# Patient Record
Sex: FEMALE | Race: BLACK OR AFRICAN AMERICAN
[De-identification: names, ages, dates, MRNs, and addresses within clinical notes are randomized per-mention and may not be internally consistent; named-entity substitution may affect disease eponyms.]

---

## 2017-12-18 NOTE — NUR
BBRA FROM SNF FOR LOW O2 SAT LEVEL; PER REPORT PT'S OT SAT IN THE SNF WAS AT 
LOW 90'S. PT PLACED ON CONT CARDIAC AND POX MONITORING, SATS AT 94%, RT AT 
BEDSIDE SUCTIONING THE PT'S TRACH-O2 SAT IMPROVED % AFTER SUCTIONING. 
AFEBRILE. VSS ALL NEEDS ARE ATTENDED, PENDING ER MD HUERTA

## 2017-12-19 NOTE — NUR
RN NOTES



00:05 AM ADMITTED  A 67 YEARS OLD FEMALE PATIENT  FROM ER TRANSFER VIA STRETCHER AOX3  
ADMITTED UNDER  NEGRETTE DNP DIAGNOSED WITH RESP . FAILURE WITH HX. OF A- FIB, RESP FAILURE, 
VENT DEPT., PNEUMONECTOMY, GIB,. ANEMIA. HIGH CHOLESTEROL AND COPD. ALLERGIC TO CODEINE, 
ALBUTEROL  WARFARIN,. TRAMADOL. WITH TRACH PORTEX 8 CONNECTED TO VENT SETTING  AC 16   
FIO2 40% PEEP 5  TOLERATED WELL SATING 99%. PT IS VERBALLY RESPONSIVE.  WITH IV SITE ON 
RIGHT WRIST AND LEFT HAND G 20  INTACT AND PATENT NO INFILTRATION NO SWELLING WHEN FLUSHED.  
TELE MONITOR PLACED  REVEALS  SR - . NO ACUTE RESP DISTRESS. COMPLAINING OF PAIN  ON 
HER TRACH SITE. NO BLEEDING  FROM THE SITE. BUE AND BLE ABLE TO MOVE SKIN INTACT.  
INSTRUCTED TO USE CALL LIGHT WHEN NEED ASSISTANCE OR HELP. VS TAKEN TEMP  97.6 RESP 16 PULSE 
110 /76. KEPT PT CLEAN AND DRY BED BATH DONE. WILL CONTINUE TO MONITOR.

## 2017-12-19 NOTE — NUR
RN NOTES



SEEN AND EXAMINED BY DR. RASHEED. AWARE OF CURRENT LAB VALUES AND CXR RESULT. MD REVIEWED 
PT'S MEDICATIONS. ORDERED LABS IN AM.

## 2017-12-19 NOTE — NUR
RN CLOSING NOTES



PT STABLE. NO SIGNIFICANT CHANGE NOTED. PAIN WELL MANAGED. KEPT COMFORTABLE. IV LINES IN 
PLACE KEPT CLEAN AND DRY. ASSISTED IN REPOSITIONING. WILL ENDORSE FOR CONTINUITY OF CARE.

## 2017-12-19 NOTE — NUR
TOM[facility] RN INITIAL NOTE



PT RECEIVED IN BED. A/O X3 AND ABLE TO MOUTH WORDS OR WRITE ON PAPER. ON MECH VENT WITH 
SETTINGS WELL TOLERATED AND SATURATING WELL. TELE-SR 78. IV R WRIST/ L HAND #20 CLEAN, DRY, 
PATENT AND FLUSHING WELL. HOB ELEVATED AND ON ASPIRATION PRECAUTIONS. GTUBE CLAMPED WITHOUT 
RESIDUALS NOTED. NPO EXCEPT MEDICATIONS AT THIS TIME. CALL LIGHT WITHIN REACH. WILL CONTINUE 
TO MONITOR.

## 2017-12-19 NOTE — NUR
RN NOTES



PT ASLEEP WELL ON BED NO ACUTE RESP DISTRESS. AFEBRILE. VS STABLE. ALL DUE MEDICINE 
TOLERATED WELL. NO ASE FROM ATB GIVEN. TRACH AND VENT SETTING TOLERATED WELL. KEPT PT CLEAN 
AND DRY ALL NEEDS ATTENDED. ENDORSED CONTINUITY OF CARE TO AM NURSE.

## 2017-12-19 NOTE — NUR
RN INITIAL NOTES



RECEIVED PT AWAKE, A/OX3. NO RESPIRATORY DISTRESS NOTED. NO SOB NOTED. HOB ELEVATED. TRACH 
IN PLACE. TOLERATING VENT WELL. NO SIGNS OF PAIN NOTED. ON TELE MONITOR, SINUS RHYTHM AT 75. 
IV LINES IN PLACE. BLE ELEVATED. PT COMFORTABLE. WILL MONITOR.

## 2017-12-19 NOTE — NUR
spoke with  Aguilar [phone], patient resides st the [facility] 
at [address], [place] [phone]. Patient is trach/vent dependent, she is alert 
and responsive. Requires max to total assist with adl's. Has adequate DME at the [facility]. Current plan is to return to [facility]. 








-------------------------------------------------------------------------------

Addendum: 12/19/17 at 1507 by PEDRO ROMERO RN

-------------------------------------------------------------------------------

Amended: Links added.

## 2017-12-19 NOTE — NUR
RN NOTES



SEEN AND EXAMINED BY DR. VILLAFUERTE. AWARE OF CURRENT VENT SETTINGS AND ABG RESULT. ORDERED TO 
DECREASE AC RATE TO 12. NOTED AND CARRIED OUT. WILL MONITOR.

## 2017-12-20 NOTE — NUR
TOMÁS/RN - Notes



Dr Yepez at bedside for evaluation. Pt still noted with hypotension with BP 75/45. Per MD, 
discontinue 1L NS bolus d/t crackles. Hold Amiodarone gtt for now, as pt is hypotensive. 
Transfer to ICU to start Levophed and Amiodarone gtt. Charge nurse made aware.

## 2017-12-20 NOTE — NUR
TOM[facility]S/RN - Notes



ABG results to Dr Ghosh by Latrice reese RN. RT Zambrano received orders for ventilator changes, 
and ABG in 2 hours.

## 2017-12-20 NOTE — NUR
RN NOTES

IN BED, RESTING COMFORTABLY, ALERT, ABLE TO MAKE NEEDS KNOWN BY WRITING. RT AT BEDSIDE DID 
TRACHE SUCTIONING. PT HAS R WRIST HL AND LH HL. PEG CLAMPED. TURNED TO R SIDE. DIAPERED. NO 
C/O PAIN. BED LOW AND LOCKED. CALL LIGHT WITHIN REACHED. WILL CONTINUE TO MONITOR.

## 2017-12-20 NOTE — NUR
TOM[facility]S/RN - Transfer



Pt transferred to ICU via ACLS protocol. Pt to be started on Levophed for hypotension. 
Report given to RYLEE Tomas for continuity of care. Will notify family regarding transfer.

## 2017-12-20 NOTE — NUR
RN ICU

RECEIVED PATIENT FROM THE [facility]. PATIENT IS ALERT AND ORIENTED X 4. VENT/TRACH. NO ACUTE 
DISTRESS. NOW SINUS RHYTHM. BP MONITORED. AFEBRILE. VENT SETTINGS REVIEWED AND VERIFIED. 
TUBE FEEDING ON HOLD DUE TO ASPIRATION RISK. WILL CONTINUE TO MONITOR AND PROVIDE CARE.

## 2017-12-20 NOTE — NUR
TOMÁS/RN - Notes



Pt noted with elevated HR up to 160's on telemetry monitor. Stat EKG done showing atrial 
flutter with . /81. Pt noted to be anxious and diaphoretic. Dr Ghosh called and 
notified regarding change of condition. Received orders for stat CXR, doppler BLE, ABG 
troponin x3, and Ativan 1mg IVP one time. Will carry out orders.

## 2017-12-20 NOTE — NUR
TOMÁS/RN - Notes



Dr Marleni Armando called, regarding pt's condition. Pt HR at 155, /135, 
complaining of chest pain. Ativan 1mg IVP given. Received orders to obtain Cardio consult 
(Dr Andres), start Metoprolol 25mg BID via GT, and Morphine 2mg IVP Q4h. Verified with 
patient regarding codeine allergy, pt states "I have received Morphine before and not 
experience any reaction." Will carry out MD orders.

## 2017-12-20 NOTE — NUR
ICU/RN - Notes



Dr Yepez notified pt is hypotensive with BP 64/43. Received orders to give 1L NS bolus.

-------------------------------------------------------------------------------

Addendum: 12/20/17 at 1151 by ARUN REDMAN RN

-------------------------------------------------------------------------------

Intervention took place at 1026

## 2017-12-20 NOTE — NUR
TOMÁS/RN - Notes



Pt seen By Dr Andres at this time, with new orders received to start Amiodarone gtt.

## 2017-12-20 NOTE — NUR
TOM[facility]S RN CLOSING NOTE



NO ACUTE DISTRESS NOTED. VENT SETTINGS WELL TOLERATED. HOB ELEVATED. ON ASPIRATION 
PRECAUTIONS. NOTED WITH 100 ML RESIDUALS AT MIDNIGHT. 530 AM NOTED WITHOUT RESIDUALS AND 
FLUSHING WELL. ALL NEEDS ATTENDED TO PROMPTLY. CALL LIGHT WITHIN REACH. WILL ENDORSE TO NEXT 
SHIFT FOR CONTINUITY OF CARE.

## 2017-12-21 NOTE — NUR
TELE RN NOTE 

 RECEIVED PATENT ROM ICI ALERT , ORIENTED X2 \3, FAMILY AT BEDSIDE ,  VS TAKEN , WITH TRACH 
TO VENT SETTING AS ORDERED , AMBU ABG AT [facility] AT ALL TIME  , TRACH CARE DONE ,SUCTION DONE VS 
TAKEN,  BED IN LOWEST AND LOCKED POSITION, WILL CONT TO MONITOR CLOSELY

## 2017-12-21 NOTE — NUR
TELE RN INITIAL NOTES



RECEIVED PATIENT AWAKE, A/OX3, ABLE TO MAKE NEEDS KNOWN.  VENT DEPENDENT, MOUTHS WORDS.  
DENIES PAIN OR DISCOMFORT AT THIS TIME.   AT BEDSIDE.  RESPIRATIONS EVEN AND 
UNLABORED, WITH VENT SETTINGS AC 20, , FIO2 30%, PEEP 5, SPO2 100%.  SKIN WARM AND DRY 
TO TOUCH.  ON TELE MONITOR SINUS TACH.  WITH GTF AT 30ML/HR, GT PATENT AND INTACT, MINIMAL 
RESIDUAL NOTED.  WITH GOAL RATE 45ML/HR.  HOB KEPT ELEVATED.  SIDE RAILS UP AND LOCKED.  BED 
KEPT AT LOWEST POSITION.  CALL LIGHT KEPT WITHIN EASY REACH.  WILL CONTINUE TO MONITOR.

## 2017-12-21 NOTE — NUR
RN ICU

RECEIVED PATIENT FROM THE PREVIOUS SHIFT. PATIENT IS IN BED. RESTING COMFORTABLY. NO  ACUTE 
DISTRESS NOTED. ALERT AND AWAKE. VENT SETTINGS REVIEWED AND VERIFIED. SINUS RHYTHM ON 
MONITOR. TURNED AND REPOSITIONED FOR COMFORT AND WOUND PREVENTION. WILL CONTINUE TO MONITOR 
AND PROVIDE CARE.

## 2017-12-22 NOTE — NUR
RN INITIAL NOTES:



REC'D PT AWAKE ON BED, A/O X3, ABLE TO MAKE NEEDS KNOWN, NOT IN ANY DISTRESS, DENIES ANY 
PAIN/DISCOMFORT. ON MECH VENT VIA TRACH, SATURATING %. ON TELEMONITOR, SR. HAS PEG 
PATENT & INTACT, ON CONT TUBE FEEDING OF NUTREN X 45 CC/HR INFUSING WELL, NO RESIDUAL NOTED 
UPON CHECKING. HAS LFA G22, SL, FLUSHED, PATENT & INTACT W/ NO S/SX OF 
INFECTION/INFILTRATION NOTED. PROVIDED COMFORT & SAFETY MEASURES. BED KEPT LOW & IN LOCKED 
POS. CALL LIGHT PLACED W/IN REACH. WILL CONTINUE TO MONITOR AND ATTEND PT NEEDS.

## 2017-12-22 NOTE — NUR
TELE RN INITIAL NOTES



RECEIVED PATIENT AWAKE A/OX3 ABLE TO MAKE NEEDS KNOWN.  VENT DEPENDENT WITH VENT SETTINGS AC 
20, , FIO2 30%,  PEEP 5, SPO2 100%.  ON TELE MONITOR ATRIAL TACH 110.  WITH C/O 5/10 
PAIN.  SKIN WARM AND DRY TO TOUCH .  WITH GT PATENT AND INTACT.   NO RESIDUAL NOTED.  HOB 
KEPT ELEVATED.  SIDE RAILS UP AND LOCKED.  BED KEPT AT LOWEST POSITION.   CALL LIGHT KEPT 
WITHIN EASY REACH.  WILL CONTINUE TO MONITOR.

## 2017-12-22 NOTE — NUR
RN CLOSING NOTES:



NO ACUTE CHANGES NOTED W/IN SHIFT. PT TOLERATED MECH VENT SETTINGS VIA TRACH, SATURATING AT 
100%. ON TELEMONITOR, NOW ST. PEG KEPT PATENT & INTACT, ON CONT TUBE FEEDING OF NUTREN X 45 
CC/HR TOLERATED WELL, NO RESIDUAL NOTED W/IN SHIFT. LFA G22, SL, KEPT PATENT & INTACT W/ NO 
S/SX OF INFECTION/INFILTRATION NOTED. KEPT WELL RESTED. NEEDS ATTENDED. BED KEPT LOW & IN 
LOCKED POS. CALL LIGHT PLACED W/IN REACH. WILL ENDORSE TO PM RN FOR PHONG.

## 2017-12-23 NOTE — NUR
RN CLOSING NOTE



PT REMAINS IN NO ACUTE DISTRESS IN BED. PT DID NOT HAVE ANY SIGNIFICANT CHANGE IN CONDITION 
DURING SHIFT. PT TOLERATED VENT SETTING WELL. EMS AND RT AT BEDSIDE. PT TOLERATED TRANSFER 
TO [facility]. IV AND TELE REMOVED. PT LEFT HOSPITAL IN STABLE CONDITION WITH EMS TO [facility].

## 2017-12-23 NOTE — NUR
RN NOTES

PATIENT RESTING IN BED, NO ACUTE CHANGE IN CONDITION.  NO RESPIRATORY DISTRESS NOTED. 
TOLERATING CURRENT MECH VENT SETTINGS. PAIN MONITORED AND MANAGED AS NEEDED. HYDROCODONE PRN 
GIVEN WITH RELIEF. TOLERATING GTF, NO RESIDUAL NOTED.  KEPT CLEAN AND DRY. TURNED AND 
REPOSITIONED Q2H AND PRN.  SIDE RAILS UP AND LOCKED, HOB KEPT ELEVATED,  BED KEPT AT LOWEST 
POSITION.  CALL LIGHT KEPT WITHIN EASY REACH.  FAMILY AT BEDSIDE

## 2017-12-23 NOTE — NUR
TELE RN CLOSING NOTES



NO SIGNIFICANT CHANGES OVERNIGHT.   NO RESPIRATORY DISTRESS NOTED.  PAIN MONITORED AND 
MANAGED AS NEEDED.  TOLERATING CURRENT VENT SETTINGS.  TOLERATING GTF, NO RESIDUAL NOTED.  
KEPT CLEAN AND DRY.   TURNED AND REPOSITIONED Q2 AND PRN.  SIDE RAILS UP AND LOCKED.  BED 
KEPT AT LOWEST POSITION.  CALL LIGHT KEPT WITHIN EASY REACH.  WILL ENDORSE CONTINUITY OF 
CARE TO AM NURSE.

## 2017-12-23 NOTE — NUR
RN NOTES

[facility]IV[facility] TERESA MCKEE. ESTIMATED  TIME IS 1930

## 2018-01-24 ENCOUNTER — HOSPITAL ENCOUNTER (INPATIENT)
Dept: HOSPITAL 54 - ER | Age: 68
LOS: 14 days | Discharge: NURSING, REHAB OR LONG-TERM CARE | DRG: 207 | End: 2018-02-07
Attending: INTERNAL MEDICINE | Admitting: INTERNAL MEDICINE
Payer: COMMERCIAL

## 2018-01-24 VITALS — SYSTOLIC BLOOD PRESSURE: 100 MMHG | DIASTOLIC BLOOD PRESSURE: 59 MMHG

## 2018-01-24 VITALS — DIASTOLIC BLOOD PRESSURE: 68 MMHG | SYSTOLIC BLOOD PRESSURE: 114 MMHG

## 2018-01-24 VITALS — DIASTOLIC BLOOD PRESSURE: 64 MMHG | SYSTOLIC BLOOD PRESSURE: 115 MMHG

## 2018-01-24 VITALS — DIASTOLIC BLOOD PRESSURE: 66 MMHG | SYSTOLIC BLOOD PRESSURE: 99 MMHG

## 2018-01-24 VITALS — SYSTOLIC BLOOD PRESSURE: 111 MMHG | DIASTOLIC BLOOD PRESSURE: 66 MMHG

## 2018-01-24 VITALS — BODY MASS INDEX: 21.52 KG/M2 | HEIGHT: 61 IN | WEIGHT: 114 LBS

## 2018-01-24 VITALS — DIASTOLIC BLOOD PRESSURE: 66 MMHG | SYSTOLIC BLOOD PRESSURE: 112 MMHG

## 2018-01-24 VITALS — DIASTOLIC BLOOD PRESSURE: 69 MMHG | SYSTOLIC BLOOD PRESSURE: 117 MMHG
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
## 2018-01-24 DIAGNOSIS — R13.10: ICD-10-CM

## 2018-01-24 DIAGNOSIS — I25.10: ICD-10-CM

## 2018-01-24 DIAGNOSIS — J96.21: Primary | ICD-10-CM

## 2018-01-24 DIAGNOSIS — I48.92: ICD-10-CM

## 2018-01-24 DIAGNOSIS — I48.91: ICD-10-CM

## 2018-01-24 DIAGNOSIS — I11.0: ICD-10-CM

## 2018-01-24 DIAGNOSIS — J96.22: ICD-10-CM

## 2018-01-24 DIAGNOSIS — K21.9: ICD-10-CM

## 2018-01-24 DIAGNOSIS — M41.9: ICD-10-CM

## 2018-01-24 DIAGNOSIS — I50.9: ICD-10-CM

## 2018-01-24 DIAGNOSIS — Z79.01: ICD-10-CM

## 2018-01-24 DIAGNOSIS — F41.9: ICD-10-CM

## 2018-01-24 DIAGNOSIS — N17.0: ICD-10-CM

## 2018-01-24 DIAGNOSIS — Z99.11: ICD-10-CM

## 2018-01-24 DIAGNOSIS — D64.9: ICD-10-CM

## 2018-01-24 DIAGNOSIS — S31.811A: ICD-10-CM

## 2018-01-24 DIAGNOSIS — D68.59: ICD-10-CM

## 2018-01-24 DIAGNOSIS — Z90.2: ICD-10-CM

## 2018-01-24 DIAGNOSIS — E78.5: ICD-10-CM

## 2018-01-24 DIAGNOSIS — E44.0: ICD-10-CM

## 2018-01-24 DIAGNOSIS — Z93.0: ICD-10-CM

## 2018-01-24 DIAGNOSIS — Z93.1: ICD-10-CM

## 2018-01-24 DIAGNOSIS — Z87.891: ICD-10-CM

## 2018-01-24 DIAGNOSIS — J93.83: ICD-10-CM

## 2018-01-24 DIAGNOSIS — D72.829: ICD-10-CM

## 2018-01-24 DIAGNOSIS — T38.0X5A: ICD-10-CM

## 2018-01-24 DIAGNOSIS — J44.1: ICD-10-CM
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
## 2018-01-24 LAB
ALBUMIN SERPL BCP-MCNC: 3.2 G/DL (ref 3.4–5)
ALP SERPL-CCNC: 254 U/L (ref 46–116)
ALT SERPL W P-5'-P-CCNC: 46 U/L (ref 12–78)
APPEARANCE UR: (no result)
APTT PPP: 27 SEC (ref 23–34)
AST SERPL W P-5'-P-CCNC: 59 U/L (ref 15–37)
BASOPHILS # BLD AUTO: 0 /CMM (ref 0–0.2)
BASOPHILS NFR BLD AUTO: 0.3 % (ref 0–2)
BILIRUB DIRECT SERPL-MCNC: 0.1 MG/DL (ref 0–0.2)
BILIRUB SERPL-MCNC: 0.6 MG/DL (ref 0.2–1)
BILIRUB UR QL STRIP: NEGATIVE
BUN SERPL-MCNC: 29 MG/DL (ref 7–18)
CALCIUM SERPL-MCNC: 9.8 MG/DL (ref 8.5–10.1)
CHLORIDE SERPL-SCNC: 97 MMOL/L (ref 98–107)
CO2 SERPL-SCNC: 33 MMOL/L (ref 21–32)
COLOR UR: YELLOW
CREAT SERPL-MCNC: 0.9 MG/DL (ref 0.6–1.3)
EOSINOPHIL # BLD AUTO: 0.1 /CMM (ref 0–0.7)
EOSINOPHIL NFR BLD AUTO: 0.5 % (ref 0–6)
GLUCOSE SERPL-MCNC: 169 MG/DL (ref 74–106)
GLUCOSE UR STRIP-MCNC: NEGATIVE MG/DL
HCT VFR BLD AUTO: 39 % (ref 33–45)
HGB BLD-MCNC: 12.4 G/DL (ref 11.5–14.8)
HGB UR QL STRIP: NEGATIVE ERY/UL
INR PPP: 1.1 (ref 0.87–1.13)
KETONES UR STRIP-MCNC: NEGATIVE MG/DL
LEUKOCYTE ESTERASE UR QL STRIP: NEGATIVE
LYMPHOCYTES NFR BLD AUTO: 2.5 /CMM (ref 0.8–4.8)
LYMPHOCYTES NFR BLD AUTO: 23.7 % (ref 20–44)
MCH RBC QN AUTO: 28 PG (ref 26–33)
MCHC RBC AUTO-ENTMCNC: 32 G/DL (ref 31–36)
MCV RBC AUTO: 88 FL (ref 82–100)
MONOCYTES NFR BLD AUTO: 0.4 /CMM (ref 0.1–1.3)
MONOCYTES NFR BLD AUTO: 3.3 % (ref 2–12)
NEUTROPHILS # BLD AUTO: 7.7 /CMM (ref 1.8–8.9)
NEUTROPHILS NFR BLD AUTO: 72.2 % (ref 43–81)
NITRITE UR QL STRIP: NEGATIVE
NT-PROBNP SERPL-MCNC: 4370 PG/ML (ref 0–125)
PH UR STRIP: 6 [PH] (ref 5–8)
PLATELET # BLD AUTO: 298 /CMM (ref 150–450)
POTASSIUM SERPL-SCNC: 5.1 MMOL/L (ref 3.5–5.1)
PROT SERPL-MCNC: 8.6 G/DL (ref 6.4–8.2)
PROT UR QL STRIP: (no result) MG/DL
RBC # BLD AUTO: 4.44 MIL/UL (ref 4–5.2)
RBC #/AREA URNS HPF: (no result) /HPF (ref 0–2)
RDW COEFFICIENT OF VARIATION: 16.3 (ref 11.5–15)
SODIUM SERPL-SCNC: 131 MMOL/L (ref 136–145)
TROPONIN I SERPL-MCNC: < 0.017 NG/ML (ref 0–0.06)
UROBILINOGEN UR STRIP-MCNC: 0.2 EU/DL
WBC #/AREA URNS HPF: (no result) /HPF (ref 0–3)
WBC NRBC COR # BLD AUTO: 10.7 K/UL (ref 4.3–11)

## 2018-01-24 PROCEDURE — A4216 STERILE WATER/SALINE, 10 ML: HCPCS

## 2018-01-24 PROCEDURE — A6403 STERILE GAUZE>16 <= 48 SQ IN: HCPCS

## 2018-01-24 PROCEDURE — A4606 OXYGEN PROBE USED W OXIMETER: HCPCS

## 2018-01-24 PROCEDURE — Z7610: HCPCS

## 2018-01-24 PROCEDURE — A7526 TRACHEOSTOMY TUBE COLLAR: HCPCS

## 2018-01-24 PROCEDURE — A4623 TRACHEOSTOMY INNER CANNULA: HCPCS

## 2018-01-24 PROCEDURE — 5A1955Z RESPIRATORY VENTILATION, GREATER THAN 96 CONSECUTIVE HOURS: ICD-10-PCS | Performed by: INTERNAL MEDICINE

## 2018-01-24 RX ADMIN — METOPROLOL TARTRATE SCH MG: 25 TABLET, FILM COATED ORAL at 09:00

## 2018-01-24 RX ADMIN — PANTOPRAZOLE SODIUM SCH MG: 40 GRANULE, DELAYED RELEASE ORAL at 09:12

## 2018-01-24 RX ADMIN — LORAZEPAM PRN MG: 1 TABLET ORAL at 10:04

## 2018-01-24 RX ADMIN — Medication PRN OZ: at 09:14

## 2018-01-24 RX ADMIN — Medication SCH MG: at 20:08

## 2018-01-24 RX ADMIN — ACETAMINOPHEN PRN MG: 325 TABLET ORAL at 16:17

## 2018-01-24 RX ADMIN — Medication PRN ML: at 09:41

## 2018-01-24 RX ADMIN — DOCUSATE SODIUM SCH MG: 50 LIQUID ORAL at 09:12

## 2018-01-24 RX ADMIN — FERROUS SULFATE TAB 325 MG (65 MG ELEMENTAL FE) SCH MG: 325 (65 FE) TAB at 09:12

## 2018-01-24 RX ADMIN — SIMETHICONE SCH MG: 20 SUSPENSION/ DROPS ORAL at 09:13

## 2018-01-24 RX ADMIN — Medication SCH MG: at 07:29

## 2018-01-24 RX ADMIN — Medication SCH MG: at 03:37

## 2018-01-24 RX ADMIN — Medication SCH MG: at 09:12

## 2018-01-24 RX ADMIN — Medication SCH MG: at 11:32

## 2018-01-24 RX ADMIN — LEVOFLOXACIN SCH MLS/HR: 500 INJECTION, SOLUTION INTRAVENOUS at 03:43

## 2018-01-24 RX ADMIN — RIVAROXABAN SCH MG: 10 TABLET, FILM COATED ORAL at 16:17

## 2018-01-24 RX ADMIN — ATORVASTATIN CALCIUM SCH MG: 10 TABLET, FILM COATED ORAL at 21:56

## 2018-01-24 RX ADMIN — Medication SCH MG: at 23:44

## 2018-01-24 RX ADMIN — FOLIC ACID SCH MG: 1 TABLET ORAL at 09:12

## 2018-01-24 RX ADMIN — SODIUM CHLORIDE PRN MLS/HR: 9 INJECTION, SOLUTION INTRAVENOUS at 03:43

## 2018-01-24 RX ADMIN — METOPROLOL TARTRATE SCH MG: 25 TABLET, FILM COATED ORAL at 21:00

## 2018-01-24 RX ADMIN — SODIUM CHLORIDE PRN MLS/HR: 9 INJECTION, SOLUTION INTRAVENOUS at 16:21

## 2018-01-24 RX ADMIN — Medication SCH MG: at 14:40

## 2018-01-24 NOTE — NUR
to bed 2 bib paramedics c/o sob, rhonchi heard bilaterally on auscultation. pt 
aaox4 no acute distress noted, resp even and unlabored. place pt no cardiac 
monitoring, continuous pox. rt at bedside to place pt on vent.

## 2018-01-24 NOTE — NUR
[facility]OM[facility] RN OPENING NOTES

RECEIVED REPORT FROM ALFA MCKEE. PATIENT A/A/O X3, ABLE TO MOUTH WORDS & MAKE NEEDS KNOWN. 
BREATHING EVEN & UNLABORED W/ TRACH INTACT & TOLERATING VENT SETTINGS AC 16, , FIO2 
28%, PEEP 5. NO RESPIRATORY DISTRESS NOTED. ON TELE SINUS TACH, . DENIES NAY CHEST 
PAIN OR DISCOMFORT. LEFT FOREARM IV #18 INTACT W/ DRESSING CDI & IVF NS @ 75 ML/HR. G-TUBE 
INTACT & FLUSHING WELL. GTF NUTREN @ 45 ML/HR, NO RESIDUAL NOTED @ THIS TIME. PATIENT 
RESTING COMFORTABLY IN BED. SAFETY MEASURES IN PLACE W/ SIDE RAILS UP, BED LOCKED & IN 
LOWEST POSITION & CALL LIGHT WITHIN REACH. FAMILY @ BEDSIDE. WILL CONTINUE TO MONITOR.

-------------------------------------------------------------------------------

Addendum: 01/24/18 at 1953 by TIM LLOYD RN

-------------------------------------------------------------------------------

RECEIVED REPORT FROM IVA RYAN

## 2018-01-24 NOTE — NUR
RN CLOSING NOTES:



NO ACUTE CHANGES NOTED W/ SHIFT. PT TOLERATED MV VIA TRACH. ON TELEMONITOR, STILL SR/ST. PEG 
KEPT PATENT & INTACT, STARTED ON NUTREN X 45 CC/HR TOLERATED WELL, NO RESIDUAL NOTED W/IN 
SHIFT. LFA G18 NO S/SX OF INFECTION/ INFILTRATION NOTED W/ NS X 75 CC/HR. KEPT WELL RESTED. 
NEEDS ATTENDED. BED KEPT LOW & IN LOCKED POS. CALL LIGHT PLACED W/IN REACH. ENDORSED TO PM 
RN FOR PHONG.

## 2018-01-24 NOTE — NUR
[facility]OM[facility] RN CLOSING NOTE



PT REMAINED STABLE DURING SHIFT. NO C/O PAIN. NO S/SX OF RESPIRATORY DISTRESS NOTED. HOB 
ELEVATED. IV FLUIDS STARTED AND MEDS GIVEN. KEPT CLEAN AND DRY. SUCTIONED AS NEEDED. CALL 
LIGHT WITHIN REACH. WILL ENDORSE TO NEXT SHIFT FOR CONTINUITY OF CARE.

## 2018-01-24 NOTE — NUR
Received pt on vent support, pt stable on current settings, no SOB or respiratory distress 
noted.Ventilator is plugged into red outlet, alarms are audible, ambu bag at bedside. Will 
continue monitoring per MDS orders.

-------------------------------------------------------------------------------

Addendum: 01/24/18 at 0536 by TEO HIGGINBOTHAM RT

-------------------------------------------------------------------------------

Amended: Links added.

## 2018-01-24 NOTE — NUR
RN INITIAL NOTES:



REC'D PT AWAKE ON BED, NOT IN ANY DISTRESS, A/O X 3, ABLE TO MAKE NEEDS KNOWN. ON MV VIA 
TRACH, SATING %. ON TELEMONITOR, SR/ST. HAS PEG PATENT & INTACT, CLAMPED AT THIS TIME, 
FEEDING TO START. HAS LFA G18 W/ NS X 75 CC/HR INFUSING WELL. PROVIDED COMFORT & SAFETY 
MEASURES. BED KEPT LOW & IN LOCKED POS. CALL LIGHT PLACED W/IN REACH. WILL CONTINUE TO 
MONITOR AND ATTEND PT NEEDS.

## 2018-01-24 NOTE — NUR
TOM[facility]S RN INITIAL NOTE



A/O X4 AND ABLE TO MOUTH WORDS OR WRITE DOWN NEEDS. VENT DEPENDENT AND TOLERATING WELL. NO 
SOB NOTED. NO C/O PAIN OR DISCOMFORT. HOB ELEVATED. ON ASPIRATION PRECAUTIONS. IV LFA #18 
CLEAN AND DRY. ALL ORDERS CARRIED OUT. CALL LIGHT WITHIN REACH. WILL CONTINUE TO MONITOR.

## 2018-01-25 VITALS — SYSTOLIC BLOOD PRESSURE: 128 MMHG | DIASTOLIC BLOOD PRESSURE: 66 MMHG

## 2018-01-25 VITALS — SYSTOLIC BLOOD PRESSURE: 123 MMHG | DIASTOLIC BLOOD PRESSURE: 77 MMHG

## 2018-01-25 VITALS — SYSTOLIC BLOOD PRESSURE: 132 MMHG | DIASTOLIC BLOOD PRESSURE: 99 MMHG

## 2018-01-25 VITALS — SYSTOLIC BLOOD PRESSURE: 132 MMHG | DIASTOLIC BLOOD PRESSURE: 77 MMHG

## 2018-01-25 VITALS — SYSTOLIC BLOOD PRESSURE: 120 MMHG | DIASTOLIC BLOOD PRESSURE: 72 MMHG

## 2018-01-25 VITALS — DIASTOLIC BLOOD PRESSURE: 66 MMHG | SYSTOLIC BLOOD PRESSURE: 130 MMHG

## 2018-01-25 LAB
BASE EXCESS BLDA CALC-SCNC: 0.4 MMOL/L
BASOPHILS # BLD AUTO: 0 /CMM (ref 0–0.2)
BASOPHILS NFR BLD AUTO: 0.1 % (ref 0–2)
BUN SERPL-MCNC: 25 MG/DL (ref 7–18)
CALCIUM SERPL-MCNC: 9.3 MG/DL (ref 8.5–10.1)
CHLORIDE SERPL-SCNC: 104 MMOL/L (ref 98–107)
CO2 SERPL-SCNC: 26 MMOL/L (ref 21–32)
CREAT SERPL-MCNC: 1 MG/DL (ref 0.6–1.3)
DO-HGB MFR BLDA: 37.6 MMHG
EOSINOPHIL # BLD AUTO: 0 /CMM (ref 0–0.7)
EOSINOPHIL NFR BLD AUTO: 0 % (ref 0–6)
GLUCOSE SERPL-MCNC: 216 MG/DL (ref 74–106)
HCT VFR BLD AUTO: 39 % (ref 33–45)
HGB BLD-MCNC: 12.5 G/DL (ref 11.5–14.8)
INHALED O2 CONCENTRATION: 28 %
INTRINSIC PEEP RESPIRATORY: 5 CM H2O
LYMPHOCYTES NFR BLD AUTO: 2.4 /CMM (ref 0.8–4.8)
LYMPHOCYTES NFR BLD AUTO: 21.8 % (ref 20–44)
MAGNESIUM SERPL-MCNC: 1.9 MG/DL (ref 1.8–2.4)
MCH RBC QN AUTO: 28 PG (ref 26–33)
MCHC RBC AUTO-ENTMCNC: 32 G/DL (ref 31–36)
MCV RBC AUTO: 88 FL (ref 82–100)
MONOCYTES NFR BLD AUTO: 0.1 /CMM (ref 0.1–1.3)
MONOCYTES NFR BLD AUTO: 0.6 % (ref 2–12)
NEUTROPHILS # BLD AUTO: 8.4 /CMM (ref 1.8–8.9)
NEUTROPHILS NFR BLD AUTO: 77.5 % (ref 43–81)
PCO2 TEMP ADJ BLDA: 42.9 MMHG (ref 35–45)
PEEP SETTING VENT: 400 ML
PH TEMP ADJ BLDA: 7.39 [PH] (ref 7.35–7.45)
PHOSPHATE SERPL-MCNC: 4.1 MG/DL (ref 2.5–4.9)
PLATELET # BLD AUTO: 342 /CMM (ref 150–450)
PO2 TEMP ADJ BLDA: 111.4 MMHG (ref 75–100)
POTASSIUM SERPL-SCNC: 4.8 MMOL/L (ref 3.5–5.1)
RBC # BLD AUTO: 4.46 MIL/UL (ref 4–5.2)
RDW COEFFICIENT OF VARIATION: 16.3 (ref 11.5–15)
SAO2 % BLDA: 97.4 % (ref 92–98.5)
SET RATE, BG: 16
SODIUM SERPL-SCNC: 140 MMOL/L (ref 136–145)
VENTILATION MODE VENT: (no result)
WBC NRBC COR # BLD AUTO: 10.9 K/UL (ref 4.3–11)

## 2018-01-25 RX ADMIN — Medication SCH MG: at 08:52

## 2018-01-25 RX ADMIN — ATORVASTATIN CALCIUM SCH MG: 10 TABLET, FILM COATED ORAL at 23:32

## 2018-01-25 RX ADMIN — ACETAMINOPHEN PRN MG: 325 TABLET ORAL at 11:43

## 2018-01-25 RX ADMIN — LEVOFLOXACIN SCH MLS/HR: 500 INJECTION, SOLUTION INTRAVENOUS at 02:44

## 2018-01-25 RX ADMIN — Medication PRN MG: at 14:06

## 2018-01-25 RX ADMIN — FERROUS SULFATE TAB 325 MG (65 MG ELEMENTAL FE) SCH MG: 325 (65 FE) TAB at 08:52

## 2018-01-25 RX ADMIN — NITROGLYCERIN PRN MG: 0.4 TABLET, ORALLY DISINTEGRATING SUBLINGUAL at 03:35

## 2018-01-25 RX ADMIN — NITROGLYCERIN PRN MG: 0.4 TABLET, ORALLY DISINTEGRATING SUBLINGUAL at 03:21

## 2018-01-25 RX ADMIN — ASPIRIN 81 MG SCH MG: 81 TABLET ORAL at 08:52

## 2018-01-25 RX ADMIN — FOLIC ACID SCH MG: 1 TABLET ORAL at 08:52

## 2018-01-25 RX ADMIN — Medication SCH MG: at 19:19

## 2018-01-25 RX ADMIN — Medication PRN MG: at 08:53

## 2018-01-25 RX ADMIN — DOCUSATE SODIUM SCH MG: 50 LIQUID ORAL at 08:52

## 2018-01-25 RX ADMIN — ACETAMINOPHEN PRN MG: 325 TABLET ORAL at 06:53

## 2018-01-25 RX ADMIN — NITROGLYCERIN PRN MG: 0.4 TABLET, ORALLY DISINTEGRATING SUBLINGUAL at 03:27

## 2018-01-25 RX ADMIN — Medication SCH MG: at 11:12

## 2018-01-25 RX ADMIN — Medication SCH MG: at 07:17

## 2018-01-25 RX ADMIN — METOPROLOL TARTRATE SCH MG: 25 TABLET, FILM COATED ORAL at 23:32

## 2018-01-25 RX ADMIN — Medication SCH MG: at 16:08

## 2018-01-25 RX ADMIN — PANTOPRAZOLE SODIUM SCH MG: 40 GRANULE, DELAYED RELEASE ORAL at 08:52

## 2018-01-25 RX ADMIN — RIVAROXABAN SCH MG: 10 TABLET, FILM COATED ORAL at 16:53

## 2018-01-25 RX ADMIN — LEVOFLOXACIN SCH MG: 500 TABLET, FILM COATED ORAL at 23:33

## 2018-01-25 RX ADMIN — METOPROLOL TARTRATE SCH MG: 25 TABLET, FILM COATED ORAL at 08:53

## 2018-01-25 RX ADMIN — Medication SCH MG: at 23:15

## 2018-01-25 RX ADMIN — SIMETHICONE SCH MG: 20 SUSPENSION/ DROPS ORAL at 08:59

## 2018-01-25 RX ADMIN — LORAZEPAM PRN MG: 1 TABLET ORAL at 02:44

## 2018-01-25 RX ADMIN — Medication PRN ML: at 06:47

## 2018-01-25 RX ADMIN — SODIUM CHLORIDE PRN MLS/HR: 9 INJECTION, SOLUTION INTRAVENOUS at 09:04

## 2018-01-25 RX ADMIN — LORAZEPAM PRN MG: 1 TABLET ORAL at 15:33

## 2018-01-25 RX ADMIN — Medication SCH MG: at 03:44

## 2018-01-25 NOTE — NUR
TOM[facility]S RN NOTE 

 SEEN BY RT ,BREATHING TX DONE ,TYLENOL FOR GENERAL PAIN GIVEN, WILL CONT TO MONITOR 
CLOSELY. PER DR FANTA NOYOLA IVF

## 2018-01-25 NOTE — NUR
TOM[facility]S RN NOTES

ASPIRIN 81 MG ADMINISTERED VIA G-TUBE. PATIENT CONTINUES TO C/O NON-RADIATING CHEST PAIN W/ 
HR STILL IN 200S. SPOKE TO DR JEWELL & NEW ORDER FOR ADENOSINE PUSH RECEIVED.

## 2018-01-25 NOTE — NUR
[facility]OM[facility] RN INITIAL NOTE



PT RECEIVED ASLEEP BUT EASILY AROUSABLE TO NAME AND LIGHT TOUCH. A/O X3 AND ABLE TO MOUTH 
WORDS OR WRITE NEEDS ON PAPER. ON MECH VENT WITH SETTINGS WELL TOLERATED AND SATURATING 
WELL. TELE- A-FLUTTER 70. IV LFA #18 CLEAN, DRY AND PATENT. GTUBE FEEDING WELL TOLERATED AND 
NO RESIDUALS NOTED. HOB ELEVATED AND ON ASPIRATION PRECAUTIONS. NO C/O PAIN OR DISCOMFORT 
NOTED. CALL LIGHT WITHIN REACH. WILL CONTINUE TO MONITOR.

## 2018-01-25 NOTE — NUR
RCVD PT ON VENT WITH NOTED SETTINGS. PT IS ALERT AND AWAKE. VENT ALARM WORKING AND AUDIBLE, 
VENT PLUGGED INTO RED OUTLET, AMBU BAG AT BEDSIDE. SXN  SMALL AMOUNT OF YELLOW THICK 
SECRETIONS. TX GIVEN PER MD'S ORDERED, NO ADVERSE REACTION NOTED. NO RESPIRATORY DISTRESS 
NOTED AT THIS TIME . WILL CONTINUE TO MONITOR THE PT.

## 2018-01-25 NOTE — NUR
TOM[facility]S RN NOTES

PATIENT NOTED W/ INCREASED HR 200S AND TELE READING V-TACH W/ C/O NON-RADIATING CHEST PAIN. 
/92. CALLED & SPOKE TO DR JEWELL & NEW ORDERS RECEIVED.

## 2018-01-25 NOTE — NUR
lalita rn note

 c\o general pain in body, morphine 2 mg ivp , abg done,  rt at bedside  as ordered, will 
cont to monitor closely, bp 122/77 ,sat 100%

## 2018-01-25 NOTE — NUR
TOMÁS MCKEE NOTE 

 C\O PAIN IN BODY, MORPHINE 2 MG IVP GIVEN ,[id number]% /78

-------------------------------------------------------------------------------

Addendum: 01/25/18 at 1539 by YOLY RADER RN

-------------------------------------------------------------------------------

C\O ANXIETY , ATIVAN VIA G TUBE GIVEN AS ORDERED ,WILL CONT TO MONITOR CLOSELY  AT 
BEDSIDE

## 2018-01-25 NOTE — NUR
RT NOTE



PT REMAINS IN STABLE CONDITION. PT REMAINS ON VENT ON SETTINGS PRESCRIBED. ALARMS SET PER 
PROTOCOL AND AUDIBLE. VENT PLUGGED IN TO RED OUTLET. PT AWAKE AND ALERT. AMBU BAG AT BED 
SIDE. NO DISTRESS NOTED.

## 2018-01-25 NOTE — NUR
TOMÁS RN NOTES

PATIENT STILL C/O CHEST PAIN W/ HR 200S. 2ND DOSE NITRO ADMINISTERED SUBLINGUALLY. /99

## 2018-01-25 NOTE — NUR
TOM[facility]S RN NOTES

PATIENT STILL C/O NON-RADIATING CHEST PAIN W/ HR IN 200S. /80. MORPHINE 2MG IV ALSO 
ADMINISTERED. WILL CONTINUE TO MONITOR.

## 2018-01-25 NOTE — NUR
TOMÁS RN NOTE 

 PATIENT IN BED ,RESTING COMFORTABLY   WITH TRACH TO VENT SETTING AS ORDERED ,  ON TELE 
MONITOR SR 77 WITH PAC ,  WITH G TUBE FEEDING AS ORDERED ,KEEP HOB ELEVATED AT ALL THOR. LT 
FA HL INTACT , ON IVF AS ORDERED, BED IN LOWEST AND LOCKED POSITION , CALL LIGHT WITHIN 
REACH, WILL CONT TO MONITOR CLOSELY ,CHEST XRAY  DONE AS ORDERED

## 2018-01-25 NOTE — NUR
TOMÁS RN NOTES

RE-ASSESSED PATIENT BEFORE ADMINISTERING ADENOSINE. PER PATIENT, CHEST PAIN DECREASED. HR 
ALSO DECREASED TO 140S. ADENOSINE NON-ADMINISTERED. CHARGE NURSE AWARE. WILL MONITOR PATIENT 
CLOSELY.

## 2018-01-25 NOTE — NUR
TOMÁS RN NOTES

PATIENT ASKED TO CALL  REGARDING MORPHINE. CALLED PATIENT'S  TO CONFIRM IF 
OKAY TO GIVE MORPHINE TO PATIENT D/T ALLERGY TO CODEINE. PER PATIENT'S , IRIS, OKJUSTIN 
TO GIVE MORPHINE.

## 2018-01-25 NOTE — NUR
TOMÁS RN NOTES

PATIENT NO LONGER IN DISTRESS & DENIES ANY MORE CHEST PAIN.  & /66. TROPONIN 
0.017. WILL CONTINUE TO MONITOR PATIENT CLOSELY.

## 2018-01-25 NOTE — NUR
TOMÁS RN NOTE 

  AT BEDSIDE , WILL NEEDS ATTENDED, KEEP CLEAN DRY. ALL NEEDS ATTENDED ,WILL CONT TO 
MONITOR CLOSELY

## 2018-01-26 VITALS — SYSTOLIC BLOOD PRESSURE: 140 MMHG | DIASTOLIC BLOOD PRESSURE: 79 MMHG

## 2018-01-26 VITALS — DIASTOLIC BLOOD PRESSURE: 78 MMHG | SYSTOLIC BLOOD PRESSURE: 150 MMHG

## 2018-01-26 VITALS — SYSTOLIC BLOOD PRESSURE: 150 MMHG | DIASTOLIC BLOOD PRESSURE: 71 MMHG

## 2018-01-26 VITALS — DIASTOLIC BLOOD PRESSURE: 75 MMHG | SYSTOLIC BLOOD PRESSURE: 145 MMHG

## 2018-01-26 VITALS — DIASTOLIC BLOOD PRESSURE: 76 MMHG | SYSTOLIC BLOOD PRESSURE: 154 MMHG

## 2018-01-26 VITALS — DIASTOLIC BLOOD PRESSURE: 65 MMHG | SYSTOLIC BLOOD PRESSURE: 136 MMHG

## 2018-01-26 VITALS — DIASTOLIC BLOOD PRESSURE: 73 MMHG | SYSTOLIC BLOOD PRESSURE: 140 MMHG

## 2018-01-26 RX ADMIN — SIMETHICONE SCH MG: 20 SUSPENSION/ DROPS ORAL at 09:04

## 2018-01-26 RX ADMIN — Medication SCH MG: at 03:17

## 2018-01-26 RX ADMIN — ZOLPIDEM TARTRATE PRN MG: 5 TABLET, FILM COATED ORAL at 22:58

## 2018-01-26 RX ADMIN — RIVAROXABAN SCH MG: 10 TABLET, FILM COATED ORAL at 16:20

## 2018-01-26 RX ADMIN — Medication SCH MG: at 07:39

## 2018-01-26 RX ADMIN — Medication SCH MG: at 09:04

## 2018-01-26 RX ADMIN — Medication PRN MG: at 16:21

## 2018-01-26 RX ADMIN — Medication PRN ML: at 04:56

## 2018-01-26 RX ADMIN — LORAZEPAM PRN MG: 1 TABLET ORAL at 10:03

## 2018-01-26 RX ADMIN — PANTOPRAZOLE SODIUM SCH MG: 40 GRANULE, DELAYED RELEASE ORAL at 09:03

## 2018-01-26 RX ADMIN — Medication SCH MG: at 11:04

## 2018-01-26 RX ADMIN — Medication SCH MG: at 20:06

## 2018-01-26 RX ADMIN — Medication SCH MG: at 23:50

## 2018-01-26 RX ADMIN — FERROUS SULFATE TAB 325 MG (65 MG ELEMENTAL FE) SCH MG: 325 (65 FE) TAB at 09:04

## 2018-01-26 RX ADMIN — LEVOFLOXACIN SCH MG: 500 TABLET, FILM COATED ORAL at 22:04

## 2018-01-26 RX ADMIN — DOCUSATE SODIUM SCH MG: 50 LIQUID ORAL at 09:04

## 2018-01-26 RX ADMIN — FOLIC ACID SCH MG: 1 TABLET ORAL at 09:04

## 2018-01-26 RX ADMIN — ASPIRIN 81 MG SCH MG: 81 TABLET ORAL at 09:03

## 2018-01-26 RX ADMIN — METOPROLOL TARTRATE SCH MG: 25 TABLET, FILM COATED ORAL at 21:44

## 2018-01-26 RX ADMIN — ATORVASTATIN CALCIUM SCH MG: 10 TABLET, FILM COATED ORAL at 22:04

## 2018-01-26 RX ADMIN — METOPROLOL TARTRATE SCH MG: 25 TABLET, FILM COATED ORAL at 09:03

## 2018-01-26 RX ADMIN — Medication SCH MG: at 15:42

## 2018-01-26 RX ADMIN — LORAZEPAM PRN MG: 1 TABLET ORAL at 22:58

## 2018-01-26 NOTE — NUR
TOMÁS RN CLOSING NOTE



PT REMAINED STABLE DURING SHIFT. NO ACUTE DISTRESS NOTED. ALL NEEDS ATTENDED TO PROMPTLY. 
VENT SETTINGS WELL TOLERATED. KEPT CLEAN AND DRY. HOB ELEVATION AND ON ASPIRATION 
PRECAUTIONS. CALL LIGHT WITHIN REACH. WILL ENDORSE TO NEXT SHIFT FOR CONTINUITY OF CARE.

## 2018-01-26 NOTE — NUR
RN NOTES:



RECEIVED DISCHARGE ORDERS DURING SHIFT. CALLED CHARGE NURSE & . NO PLACEMENT 
UPDATES RECEIVED YET FROM . ENDORSED TO PM SHIFT TO CONTINUITY OF CARE.

## 2018-01-26 NOTE — NUR
RN NOTE(INITIAL)



PATIENT RECEIVED ALERT AWAKE ORIENTED, ABLE TO MAKE NEEDS KNOWN, ABLE TO MOUTH WORDS & WRITE 
TO COMMUNICATE. ON VENT -TRAC, SETTINGS TOLERATING WELL. IV CATHETER INTACT, SALINE LOCK. 
G-TUBE RUNNING AS ORDERED. NO RESIDUAL NOTED. ASPIRATION PRECAUTIONS OBSERVED. ON TELE 
MONITOR A-FLUTTER.. SAFETY MEASURES OBSERVED. CALL LIGHT WITHIN REACH. WILL CONTINUE TO 
MONITOR.

## 2018-01-26 NOTE — NUR
Received pt on vent support, pt stable on current settings, no SOB or respiratory distress 
noted.Ventilator is plugged into red outlet, alarms are audible, ambu bag at bedside. Will 
continue monitoring per MDS orders.

-------------------------------------------------------------------------------

Addendum: 01/26/18 at 2156 by TEO HIGGINBOTHAM RT

-------------------------------------------------------------------------------

Amended: Links added.

## 2018-01-26 NOTE — NUR
RT

PT TRACH'D ALERT RESPONSIVE. VENT PLUGGED IN RED OUTLET BAG AND MASK ON BEDSIDE. ALARMS ON 
AND AUDIBLE. FILIPE VENT SETTINGS, TX GIVEN, FILIPE WELL NO ADVERSE REACTION NOTED ATT, WILL 
CONTINUE TO MONITOR SX THIN SMALL PALE YELLOW SECRETIONS.

## 2018-01-27 VITALS — DIASTOLIC BLOOD PRESSURE: 88 MMHG | SYSTOLIC BLOOD PRESSURE: 135 MMHG

## 2018-01-27 VITALS — DIASTOLIC BLOOD PRESSURE: 86 MMHG | SYSTOLIC BLOOD PRESSURE: 142 MMHG

## 2018-01-27 VITALS — SYSTOLIC BLOOD PRESSURE: 142 MMHG | DIASTOLIC BLOOD PRESSURE: 86 MMHG

## 2018-01-27 VITALS — SYSTOLIC BLOOD PRESSURE: 98 MMHG | DIASTOLIC BLOOD PRESSURE: 48 MMHG

## 2018-01-27 VITALS — SYSTOLIC BLOOD PRESSURE: 127 MMHG | DIASTOLIC BLOOD PRESSURE: 77 MMHG

## 2018-01-27 VITALS — SYSTOLIC BLOOD PRESSURE: 157 MMHG | DIASTOLIC BLOOD PRESSURE: 88 MMHG

## 2018-01-27 VITALS — SYSTOLIC BLOOD PRESSURE: 128 MMHG | DIASTOLIC BLOOD PRESSURE: 64 MMHG

## 2018-01-27 RX ADMIN — Medication SCH MG: at 14:41

## 2018-01-27 RX ADMIN — PANTOPRAZOLE SODIUM SCH MG: 40 GRANULE, DELAYED RELEASE ORAL at 08:34

## 2018-01-27 RX ADMIN — Medication SCH MG: at 19:45

## 2018-01-27 RX ADMIN — RIVAROXABAN SCH MG: 10 TABLET, FILM COATED ORAL at 17:22

## 2018-01-27 RX ADMIN — ZOLPIDEM TARTRATE PRN MG: 5 TABLET, FILM COATED ORAL at 23:02

## 2018-01-27 RX ADMIN — SIMETHICONE SCH MG: 20 SUSPENSION/ DROPS ORAL at 08:37

## 2018-01-27 RX ADMIN — Medication SCH MG: at 23:51

## 2018-01-27 RX ADMIN — Medication SCH MG: at 03:27

## 2018-01-27 RX ADMIN — FOLIC ACID SCH MG: 1 TABLET ORAL at 08:33

## 2018-01-27 RX ADMIN — DOCUSATE SODIUM SCH MG: 50 LIQUID ORAL at 08:34

## 2018-01-27 RX ADMIN — Medication PRN MG: at 08:33

## 2018-01-27 RX ADMIN — Medication SCH MG: at 08:34

## 2018-01-27 RX ADMIN — Medication SCH MG: at 13:40

## 2018-01-27 RX ADMIN — LORAZEPAM PRN MG: 1 TABLET ORAL at 11:12

## 2018-01-27 RX ADMIN — LEVOFLOXACIN SCH MG: 500 TABLET, FILM COATED ORAL at 22:03

## 2018-01-27 RX ADMIN — Medication PRN ML: at 08:37

## 2018-01-27 RX ADMIN — ATORVASTATIN CALCIUM SCH MG: 10 TABLET, FILM COATED ORAL at 22:03

## 2018-01-27 RX ADMIN — METOPROLOL TARTRATE SCH MG: 25 TABLET, FILM COATED ORAL at 22:04

## 2018-01-27 RX ADMIN — FERROUS SULFATE TAB 325 MG (65 MG ELEMENTAL FE) SCH MG: 325 (65 FE) TAB at 08:34

## 2018-01-27 RX ADMIN — Medication PRN MG: at 01:26

## 2018-01-27 RX ADMIN — ASPIRIN 81 MG SCH MG: 81 TABLET ORAL at 08:34

## 2018-01-27 RX ADMIN — Medication PRN MG: at 16:37

## 2018-01-27 RX ADMIN — Medication SCH MG: at 07:35

## 2018-01-27 RX ADMIN — METOPROLOL TARTRATE SCH MG: 25 TABLET, FILM COATED ORAL at 08:34

## 2018-01-27 NOTE — NUR
RN CLOSING NOTES





PATIENT IN BED, NO DISTRESS NOTED. BREATHING EVEN AND UNLABORED. NO COMPLAINT OF PAIN AS OF 
THIS TIME. VITAL SIGNS WNL. KEPT CLEAN AND DRY. WILL ENDORSE TO AM SHIFT FOR CONTINUITY OF 
CARE.

## 2018-01-27 NOTE — NUR
TOMÁS RN INITIAL NOTE 



RN RECEIVED PATIENT IN BED AWAKE AND ALERT ORIENTED, ABLE TO MAKE NEEDS KNOWN, .  PT ON VENT 
-TRAC,  TOLERATING SETTINGS WELL. IV CLEAN DRY AND INTACT, SALINE LOCK. G-TUBE FEEDING 
CURRENTLY RUNNING . NO RESIDUAL NOTED. ASPIRATION PRECAUTIONS OBSERVED. ON TELE MONITOR 
A-FLUTTER 70'S. SAFETY MEASURES OBSERVED. CALL LIGHT WITHIN REACH. RN WILL CONTINUE TO 
MONITOR THROUGHOUT THE DAY.

## 2018-01-27 NOTE — NUR
RN INITIAL NOTE 



RN RECEIVED PATIENT IN BED AWAKE AND ALERT ORIENTED, ABLE TO MAKE NEEDS KNOWN, .  PT ON VENT 
-TRAC,  TOLERATING SETTINGS WELL. IV CLEAN DRY AND INTACT, SALINE LOCK. G-TUBE FEEDING 
CURRENTLY RUNNING . NO RESIDUAL NOTED. ASPIRATION PRECAUTIONS OBSERVED. ON TELE MONITOR 
A-FLUTTER 80'S. SAFETY MEASURES OBSERVED. CALL LIGHT WITHIN REACH. RN WILL CONTINUE TO 
MONITOR.

## 2018-01-27 NOTE — NUR
RN CLOSING NOTES





PATIENT IN BED, NO DISTRESS NOTED. BREATHING EVEN AND UNLABORED TOLERATING VENT SETTING 
WELL. PATIENT HAS EXPRESSED INCREASED ANXIETY THROUGHOUT THE DAY HOWEVER ATIVAN HAS BEEN 
ANXIOUS THROUGHOUT THE DAY . NO COMPLAINT OF PAIN AS OF THIS TIME. VITAL SIGNS WNL. KEPT 
CLEAN AND DRY. WILL ENDORSE CONTINUATION OF CARE TO PM RN

## 2018-01-27 NOTE — NUR
RN/TELE NOTES:





RECEIVED PATIENT IN BED AWAKE AND ALERT ORIENTED, ABLE TO MAKE NEEDS KNOWN VIA WRITING OR 
ABLE TO MOUTH WORDS. NO S/S OF ANY RESPIRATORY DISTRESS NOTED. PT ON VENT -TRAC,  TOLERATING 
SETTINGS WELL. IV SL ON LFA PATENT AND INTACT W/ NO S/S OF INFECTION/INFILTRATION NTOED. 
G-TUBE FEEDING CURRENTLY RUNNING . NO RESIDUAL NOTED. ASPIRATION PRECAUTIONS OBSERVED. ON 
TELE MONITOR A-FLUTTER. SAFETY MEASURES OBSERVED. CALL LIGHT WITHIN REACH. WILL CONTINUE TO 
MONITOR.

## 2018-01-27 NOTE — NUR
RN NOTES:



 MORPHINE PRN ADMINISTERED PER ORDERS. BP /88, PATIENT STATES HAVE 8/10 PAIN, 
GENERALIZED AGGREVATED BY MOVING. 



PRIMARY NURSE UPDATED.

## 2018-01-28 VITALS — DIASTOLIC BLOOD PRESSURE: 89 MMHG | SYSTOLIC BLOOD PRESSURE: 130 MMHG

## 2018-01-28 VITALS — DIASTOLIC BLOOD PRESSURE: 78 MMHG | SYSTOLIC BLOOD PRESSURE: 128 MMHG

## 2018-01-28 VITALS — DIASTOLIC BLOOD PRESSURE: 84 MMHG | SYSTOLIC BLOOD PRESSURE: 133 MMHG

## 2018-01-28 VITALS — SYSTOLIC BLOOD PRESSURE: 141 MMHG | DIASTOLIC BLOOD PRESSURE: 92 MMHG

## 2018-01-28 VITALS — DIASTOLIC BLOOD PRESSURE: 72 MMHG | SYSTOLIC BLOOD PRESSURE: 105 MMHG

## 2018-01-28 VITALS — SYSTOLIC BLOOD PRESSURE: 115 MMHG | DIASTOLIC BLOOD PRESSURE: 65 MMHG

## 2018-01-28 VITALS — SYSTOLIC BLOOD PRESSURE: 133 MMHG | DIASTOLIC BLOOD PRESSURE: 84 MMHG

## 2018-01-28 VITALS — SYSTOLIC BLOOD PRESSURE: 143 MMHG | DIASTOLIC BLOOD PRESSURE: 87 MMHG

## 2018-01-28 RX ADMIN — Medication SCH MG: at 08:06

## 2018-01-28 RX ADMIN — ASPIRIN 81 MG SCH MG: 81 TABLET ORAL at 08:05

## 2018-01-28 RX ADMIN — FERROUS SULFATE TAB 325 MG (65 MG ELEMENTAL FE) SCH MG: 325 (65 FE) TAB at 08:05

## 2018-01-28 RX ADMIN — FOLIC ACID SCH MG: 1 TABLET ORAL at 08:05

## 2018-01-28 RX ADMIN — DOCUSATE SODIUM SCH MG: 50 LIQUID ORAL at 08:05

## 2018-01-28 RX ADMIN — Medication SCH MG: at 08:33

## 2018-01-28 RX ADMIN — Medication PRN MG: at 12:08

## 2018-01-28 RX ADMIN — LORAZEPAM PRN MG: 1 TABLET ORAL at 16:46

## 2018-01-28 RX ADMIN — LORAZEPAM PRN MG: 1 TABLET ORAL at 01:02

## 2018-01-28 RX ADMIN — ZOLPIDEM TARTRATE PRN MG: 5 TABLET, FILM COATED ORAL at 22:29

## 2018-01-28 RX ADMIN — RIVAROXABAN SCH MG: 10 TABLET, FILM COATED ORAL at 16:46

## 2018-01-28 RX ADMIN — LEVOFLOXACIN SCH MG: 500 TABLET, FILM COATED ORAL at 22:29

## 2018-01-28 RX ADMIN — ATORVASTATIN CALCIUM SCH MG: 10 TABLET, FILM COATED ORAL at 21:40

## 2018-01-28 RX ADMIN — Medication SCH MG: at 19:54

## 2018-01-28 RX ADMIN — Medication SCH MG: at 11:25

## 2018-01-28 RX ADMIN — METOPROLOL TARTRATE SCH MG: 25 TABLET, FILM COATED ORAL at 08:06

## 2018-01-28 RX ADMIN — Medication SCH MG: at 07:06

## 2018-01-28 RX ADMIN — Medication SCH MG: at 03:38

## 2018-01-28 RX ADMIN — Medication PRN MG: at 05:47

## 2018-01-28 RX ADMIN — METOPROLOL TARTRATE SCH MG: 25 TABLET, FILM COATED ORAL at 21:40

## 2018-01-28 RX ADMIN — PANTOPRAZOLE SODIUM SCH MG: 40 GRANULE, DELAYED RELEASE ORAL at 08:04

## 2018-01-28 RX ADMIN — Medication SCH MG: at 16:07

## 2018-01-28 NOTE — NUR
RN CLOSING NOTE 



NO CHANGE IN PT CONDITION OVER NIGHT.  PATIENT IN BED AWAKE AND ALERT ORIENTED, ABLE TO MAKE 
NEEDS KNOWN, .  PT ON VENT -TRAC,  TOLERATING SETTINGS WELL. IV CLEAN DRY AND INTACT, SALINE 
LOCK. G-TUBE FEEDING CURRENTLY RUNNING . NO RESIDUAL NOTED. ASPIRATION PRECAUTIONS OBSERVED. 
ON TELE MONITOR A-FLUTTER 80'S TO 100S . SAFETY MEASURES OBSERVED. CALL LIGHT WITHIN REACH. 
RN WILL CONTINUE TO MONITOR.

## 2018-01-28 NOTE — NUR
TOMÁS RN AM NOTES



RECEIVED PATIENT IN BED, ASLEEP, RESPONDS TO NAME AND TOUCH, AO X3, ABLE TO MAKE NEEDS 
KNOWN, WITH PORTEX 8 IN PLACE. TRACH TO VENT SETTING AS ORDERED. TOLERATING SETTINGS WELL. 
TELEMETRY READS ATRIAL FLUTTER , NO SIGNS OF PAIN, LFA G 18, FLUSHES WELL SITE CLEAR. 
G-TUBE FEEDING NUTREN AT 45 ML/HR ONGOING, O RESIDUAL. HOB ELEVATED, SAFETY MEASURES 
OBSERVED. CALL LIGHT WITHIN REACH. WILL CONTINUE TO MONITOR.

## 2018-01-28 NOTE — NUR
TELE RN OPENING NOTES

RECEIVED PT IN BED ALERT, AWAKE, RESPONSIVE,VENT DEPENDENT.TRACH PATENT, NO RESP DISTRESS 
NOTED. GT IN PLACE,1ML RESIDUAL NOTED, TOLERATES GTF WELL, HOB ELEVATED. DENIES ANY PAIN OR 
DISCOMFORT AT THIS TIME IV SITE LT FA INTACT, PATENT NO S/SX OF INFILTRATION NOTED. KEPT 
CLEAN AND COMFORTABLE, TURNED AND REPOSITIONED Q2H.ATTENDED ALL NEEDS.WILL CONTINUE TO 
MONITOR ACCORDINGLY.

## 2018-01-28 NOTE — NUR
TELE RN CLOSING NOTES



PATIENT IN BED RESTING COMFORTABLY, AO X3, ABLE TO MAKE NEEDS KNOWN, WITH PORTEX 8 IN PLACE. 
TRACH TO VENT SETTING AS ORDERED. TOLERATING SETTINGS WELL. TELEMETRY READS ATRIAL FLUTTER 
, NO SIGNS OF PAIN, LFA G 18, FLUSHES WELL SITE CLEAR. G-TUBE FEEDING NUTREN AT 45 
ML/HR ONGOING, O RESIDUAL. HOB ELEVATED, SAFETY MEASURES OBSERVED. CALL LIGHT WITHIN REACH. 
ALL NEEDS MET. ASSISTED WITH TURNING AND REPOSITIONING EVERY 2 HOURS. PM CARE DONE. ENDORSED 
TO NEXT SHIFT FOR [place].

## 2018-01-29 VITALS — SYSTOLIC BLOOD PRESSURE: 110 MMHG | DIASTOLIC BLOOD PRESSURE: 62 MMHG

## 2018-01-29 VITALS — DIASTOLIC BLOOD PRESSURE: 81 MMHG | SYSTOLIC BLOOD PRESSURE: 127 MMHG

## 2018-01-29 VITALS — DIASTOLIC BLOOD PRESSURE: 94 MMHG | SYSTOLIC BLOOD PRESSURE: 146 MMHG

## 2018-01-29 VITALS — DIASTOLIC BLOOD PRESSURE: 90 MMHG | SYSTOLIC BLOOD PRESSURE: 190 MMHG

## 2018-01-29 VITALS — DIASTOLIC BLOOD PRESSURE: 65 MMHG | SYSTOLIC BLOOD PRESSURE: 115 MMHG

## 2018-01-29 VITALS — SYSTOLIC BLOOD PRESSURE: 128 MMHG | DIASTOLIC BLOOD PRESSURE: 80 MMHG

## 2018-01-29 VITALS — DIASTOLIC BLOOD PRESSURE: 67 MMHG | SYSTOLIC BLOOD PRESSURE: 111 MMHG

## 2018-01-29 VITALS — SYSTOLIC BLOOD PRESSURE: 116 MMHG | DIASTOLIC BLOOD PRESSURE: 71 MMHG

## 2018-01-29 VITALS — SYSTOLIC BLOOD PRESSURE: 115 MMHG | DIASTOLIC BLOOD PRESSURE: 65 MMHG

## 2018-01-29 RX ADMIN — ASPIRIN 81 MG SCH MG: 81 TABLET ORAL at 09:27

## 2018-01-29 RX ADMIN — LEVOFLOXACIN SCH MG: 500 TABLET, FILM COATED ORAL at 23:39

## 2018-01-29 RX ADMIN — Medication SCH MG: at 00:03

## 2018-01-29 RX ADMIN — FERROUS SULFATE TAB 325 MG (65 MG ELEMENTAL FE) SCH MG: 325 (65 FE) TAB at 09:27

## 2018-01-29 RX ADMIN — Medication PRN ML: at 05:34

## 2018-01-29 RX ADMIN — NITROGLYCERIN PRN MG: 0.4 TABLET, ORALLY DISINTEGRATING SUBLINGUAL at 23:38

## 2018-01-29 RX ADMIN — Medication SCH MG: at 15:59

## 2018-01-29 RX ADMIN — Medication SCH MG: at 19:56

## 2018-01-29 RX ADMIN — Medication SCH MG: at 07:26

## 2018-01-29 RX ADMIN — Medication SCH MG: at 09:29

## 2018-01-29 RX ADMIN — Medication SCH MG: at 11:55

## 2018-01-29 RX ADMIN — DOCUSATE SODIUM SCH MG: 50 LIQUID ORAL at 09:27

## 2018-01-29 RX ADMIN — RIVAROXABAN SCH MG: 10 TABLET, FILM COATED ORAL at 16:14

## 2018-01-29 RX ADMIN — Medication PRN MG: at 20:37

## 2018-01-29 RX ADMIN — ACETAMINOPHEN PRN MG: 325 TABLET ORAL at 23:59

## 2018-01-29 RX ADMIN — METOPROLOL TARTRATE SCH MG: 25 TABLET, FILM COATED ORAL at 09:28

## 2018-01-29 RX ADMIN — ATORVASTATIN CALCIUM SCH MG: 10 TABLET, FILM COATED ORAL at 22:02

## 2018-01-29 RX ADMIN — Medication SCH MG: at 23:16

## 2018-01-29 RX ADMIN — Medication PRN MG: at 09:48

## 2018-01-29 RX ADMIN — Medication SCH MG: at 09:28

## 2018-01-29 RX ADMIN — DILTIAZEM HYDROCHLORIDE SCH MG: 30 TABLET, FILM COATED ORAL at 17:38

## 2018-01-29 RX ADMIN — PANTOPRAZOLE SODIUM SCH MG: 40 GRANULE, DELAYED RELEASE ORAL at 09:27

## 2018-01-29 RX ADMIN — Medication PRN MG: at 05:49

## 2018-01-29 RX ADMIN — NITROGLYCERIN PRN MG: 0.4 TABLET, ORALLY DISINTEGRATING SUBLINGUAL at 23:45

## 2018-01-29 RX ADMIN — NITROGLYCERIN PRN MG: 0.4 TABLET, ORALLY DISINTEGRATING SUBLINGUAL at 23:51

## 2018-01-29 RX ADMIN — Medication SCH MG: at 04:16

## 2018-01-29 RX ADMIN — METOPROLOL TARTRATE SCH MG: 25 TABLET, FILM COATED ORAL at 22:02

## 2018-01-29 RX ADMIN — LORAZEPAM PRN MG: 1 TABLET ORAL at 08:56

## 2018-01-29 RX ADMIN — FOLIC ACID SCH MG: 1 TABLET ORAL at 09:27

## 2018-01-29 RX ADMIN — LORAZEPAM PRN MG: 1 TABLET ORAL at 18:31

## 2018-01-29 RX ADMIN — Medication PRN MG: at 15:57

## 2018-01-29 NOTE — NUR
PT RCVD ON VENT WITH NOTED SETTINGS. PT IS ALERT AND AWAKE.  BREATHING TX GIVEN PER MD'S 
ORDERED. VENTS PLUGGED INTO RED OUTLET, VENT ALARM WORKING AND AUDIBLE. SUCTIONED MODERATE 
AMOUNT OF YELLOW THICK SECRETIONS. BILATERAL BS NOTED, NO RESPIRATORY DISTRESS NOTED AT THIS 
TIME. WILL CONTINUE TO MONITOR THE PT.

## 2018-01-29 NOTE — NUR
RECEIVED PHONE CALL FROM DR. RODRIGUEZ, ORDERED TO PLACE PATIENT ON CARDIZEM DRIP TITRATE 
FOR COMFORT.

## 2018-01-29 NOTE — NUR
TELE RN CLOSING NOTES



PATIENT IN BED, SITTING UPRIGHT. A/O X4, REMAINS AFLUTTER HR 87 ON THE MONITOR. TRACH 
INTACT, SUCTION PRN. VENT SETTINGS REMAINS THE SAME. DR. ADKINS/CARDIO REVIEWED CARDIZEM 
ORDERS, PATIENT AND FAMILY WAS INFORMED. GTUBE INTACT WITH FEEDING NUTREN AT 45ML/HR, 
TOLERATING WELL, NO EPISODE OF VOMITING. EPISODE OF ANXIETY DURING THE SHIFT, MEDICATED WITH 
PRN ATIVAN WITH HELPED. CALL LIGHT WITHIN REACH. WILL ENDORSE TO ONCOMING RN.

## 2018-01-29 NOTE — NUR
TOMÁS RN INITIAL NOTES



RECEIVED PATIENT AWAKE A/OX4, ABLE MOUTH NEEDS.   PATIENT VENT DEPENDENT.  DAUGHTER AT 
BEDSIDE.  PATIENT C/O LEFT BREAST AND TRACH SITE PAIN, STATES IT'S NOTHING NEW.  STATES SHE 
FEELS ANXIOUS, EXPLAINED ATIVAN WAS NOT YET DUE.  PATIENT UNDERSTOOD.  RESPIRATIONS EVEN AND 
UNLABORED.   WITH VENT SETTINGS AC 16, , FIO2 28%, PEEP 5.  SPO2 100%.  ON TELE 
MONITOR AFLUTTER 116.  SKIN WARM AND DRY TO TOUCH.  WITH GT PATENT AND INTACT, IN PLACE, NO 
RESIDUAL NOTED.  HOB ELEVATED.  SIDE RAILS UP AND LOCKED.  BED KEPT AT LOWEST POSITION.  
CALL LIGHT KEPT WITHIN EASY REACH.  WILL CONTINUE TO MONITOR.

## 2018-01-29 NOTE — NUR
PATIENT ANXIOUS AND RESTLESS, SATING 98%, BREATHING NON LABORED, NO SOB. GIVEN ATIVAN 1MG 
PRN FOR ANXIETY, WILL REASSESS.

## 2018-01-29 NOTE — NUR
RAPID RESPONSE ARRIVED, TAKES OVER CARE. RE CHECK BP INCREASING /90. CHARGE NURSE, 
SUPERVISOR AT THE BEDSIDE.

## 2018-01-29 NOTE — NUR
PATIENT IN BED, APPEARS CALM AND RELAX, AFLUTTER HR 94 ON THE MONITOR. WILL CONT TO MONITOR. 
 AT THE BEDSIDE.

## 2018-01-29 NOTE — NUR
MS RN NOTES



PATIENT IN BED, AWAKE, A/O 3. TRACH INTACT, ON VENT. AFLUTTER HR 95 ON THE MONITOR, SATING 
97% NO SOB. ABDOMEN PRESENCE OF GTUBE WITH NUTREN FEEDING AT 45ML/HR, HOB ELEVATED. CALL 
LIGHT WITHIN REACH. WILL CONT TO MONITOR.

## 2018-01-29 NOTE — NUR
PATIENT C/O LEFT SIDED CHEST PAIN, LOCALIZED, NON-RADIATING.  STATES FEELS LIKE PRESSURE 
LIKE.  PATIENT NON-DIAPHORETIC.  NO RESPIRATORY DISTRESS NOTED.  SKIN WARM AND DRY TO TOUCH. 
 /74, HR 80.  PRN NITRO GIVEN.  WILL CONTINUE TO MONITOR.

## 2018-01-29 NOTE — NUR
RT 

PT TRACHD INTACT AND SECURED ALERT ORIENTED. FILIPE VENT SETTINGS ALARMS ON AND AUDIBLE AMBU 
BAG AT BEDSIDE. INITIAL TX GIVEN FILIPE WELL, 2ND TX GIVEN WITH XOPENEX PER HUSBANDS REQUEST 
PRN AFTER 1 HOUR PT BECAME ANXIOUS WITH INCREASED HR. 3RD TX GIVEN FILIPE WELL NO ADVERSE 
REACTION WILL CONTINUE TO MONITOR

## 2018-01-29 NOTE — NUR
AFLUTTER  IMMEDIATELY CHECKED THE PATIENT, IN UPRIGHT SITTING POSITION, RAPID 
BREATHING NOTED, TRACH INTACT, ON VENT. TAKING V/S WHILE ANOTHER NURSE CALLING RAPID 
RESPONSE, /57 P190 BS 207MG/DL. NOTIFIED DR. RODRIGUEZ.

## 2018-01-29 NOTE — NUR
RN CLOSING NOTES

PT IN BED ALERT, AWAKE,VENT DEPENDENT.TRACH PATENT, NO RESPIRATORY DISTRESS NOTED. GT IN 
PLACE 1ML RESIDUAL NOTED, TOLERATES GTF WELL, HOB ELEVATED. DENIES ANY PAIN OR DISCOMFORT AT 
THIS TIME.TRACH CARE DONE. BED LOCKED IN LOWEST POSITION.REFUSED PICTURES TO BE TAKEN, 
OFFERED X3, EXPLAINED BENEFITS. KEPT CLEAN AND COMFORTABLE,ATTENDED ALL NEEDS. WILL CONTINUE 
TO MONITOR ACCORDINGLY.

## 2018-01-30 VITALS — SYSTOLIC BLOOD PRESSURE: 96 MMHG | DIASTOLIC BLOOD PRESSURE: 55 MMHG

## 2018-01-30 VITALS — DIASTOLIC BLOOD PRESSURE: 68 MMHG | SYSTOLIC BLOOD PRESSURE: 104 MMHG

## 2018-01-30 VITALS — DIASTOLIC BLOOD PRESSURE: 69 MMHG | SYSTOLIC BLOOD PRESSURE: 116 MMHG

## 2018-01-30 VITALS — SYSTOLIC BLOOD PRESSURE: 116 MMHG | DIASTOLIC BLOOD PRESSURE: 60 MMHG

## 2018-01-30 VITALS — SYSTOLIC BLOOD PRESSURE: 102 MMHG | DIASTOLIC BLOOD PRESSURE: 60 MMHG

## 2018-01-30 VITALS — DIASTOLIC BLOOD PRESSURE: 67 MMHG | SYSTOLIC BLOOD PRESSURE: 126 MMHG

## 2018-01-30 VITALS — DIASTOLIC BLOOD PRESSURE: 76 MMHG | SYSTOLIC BLOOD PRESSURE: 104 MMHG

## 2018-01-30 VITALS — DIASTOLIC BLOOD PRESSURE: 74 MMHG | SYSTOLIC BLOOD PRESSURE: 131 MMHG

## 2018-01-30 RX ADMIN — Medication SCH MG: at 12:21

## 2018-01-30 RX ADMIN — Medication SCH MG: at 03:09

## 2018-01-30 RX ADMIN — RIVAROXABAN SCH MG: 10 TABLET, FILM COATED ORAL at 16:55

## 2018-01-30 RX ADMIN — Medication SCH MG: at 19:56

## 2018-01-30 RX ADMIN — LORAZEPAM PRN MG: 2 INJECTION INTRAMUSCULAR; INTRAVENOUS at 02:38

## 2018-01-30 RX ADMIN — DILTIAZEM HYDROCHLORIDE SCH MG: 30 TABLET, FILM COATED ORAL at 05:52

## 2018-01-30 RX ADMIN — DILTIAZEM HYDROCHLORIDE SCH MG: 30 TABLET, FILM COATED ORAL at 17:00

## 2018-01-30 RX ADMIN — Medication PRN MG: at 15:28

## 2018-01-30 RX ADMIN — LORAZEPAM PRN MG: 1 TABLET ORAL at 17:05

## 2018-01-30 RX ADMIN — ACETAMINOPHEN PRN MG: 325 TABLET ORAL at 09:00

## 2018-01-30 RX ADMIN — Medication SCH MG: at 07:32

## 2018-01-30 RX ADMIN — FERROUS SULFATE TAB 325 MG (65 MG ELEMENTAL FE) SCH MG: 325 (65 FE) TAB at 09:01

## 2018-01-30 RX ADMIN — LORAZEPAM PRN MG: 2 INJECTION INTRAMUSCULAR; INTRAVENOUS at 20:19

## 2018-01-30 RX ADMIN — Medication PRN ML: at 05:52

## 2018-01-30 RX ADMIN — LEVOFLOXACIN SCH MG: 500 TABLET, FILM COATED ORAL at 22:33

## 2018-01-30 RX ADMIN — Medication SCH MG: at 09:00

## 2018-01-30 RX ADMIN — METOPROLOL TARTRATE SCH MG: 25 TABLET, FILM COATED ORAL at 21:00

## 2018-01-30 RX ADMIN — FOLIC ACID SCH MG: 1 TABLET ORAL at 09:01

## 2018-01-30 RX ADMIN — Medication SCH MG: at 15:59

## 2018-01-30 RX ADMIN — ATORVASTATIN CALCIUM SCH MG: 10 TABLET, FILM COATED ORAL at 22:33

## 2018-01-30 RX ADMIN — ZOLPIDEM TARTRATE PRN MG: 5 TABLET, FILM COATED ORAL at 00:02

## 2018-01-30 RX ADMIN — PANTOPRAZOLE SODIUM SCH MG: 40 GRANULE, DELAYED RELEASE ORAL at 09:01

## 2018-01-30 RX ADMIN — ASPIRIN 81 MG SCH MG: 81 TABLET ORAL at 09:01

## 2018-01-30 RX ADMIN — DOCUSATE SODIUM SCH MG: 50 LIQUID ORAL at 08:59

## 2018-01-30 RX ADMIN — Medication SCH MG: at 09:01

## 2018-01-30 RX ADMIN — DILTIAZEM HYDROCHLORIDE SCH MG: 30 TABLET, FILM COATED ORAL at 01:13

## 2018-01-30 RX ADMIN — DILTIAZEM HYDROCHLORIDE SCH MG: 30 TABLET, FILM COATED ORAL at 12:54

## 2018-01-30 RX ADMIN — Medication SCH MG: at 23:20

## 2018-01-30 RX ADMIN — METOPROLOL TARTRATE SCH MG: 25 TABLET, FILM COATED ORAL at 09:01

## 2018-01-30 NOTE — NUR
PATIENT C/O FEELING ANXIOUS AND DIFFICULTY BREATHING.  SPO2 100%.  NON-DIAPHORETIC.  BP 
154/86, .  INFORMED RT TO GIVE BREATHING TX.  INFORMED DR. JEWELL WITH ORDERS FOR 
ATIVAN 1MG IV Q6 PRN. NOTED AND CARRIED OUT.  WILL CONTINUE TO MONITOR.

## 2018-01-30 NOTE — NUR
TELE RN NOTE 

 SEEN Y DR ADKINS WITH ORDER CHEST XRAY IN AM , ALL NEEDS ATTENDED NOT IN ACUTE  DISTRESS  NO 
C\O PAIN AFTER TYLENOL WAS GIVE IN AM

## 2018-01-30 NOTE — NUR
TELE RN NOTE 

  AT BEDSIDE , ALL NEEDS ATTENDED, NO S\S ANXIETY AT THIS TIME,  NO SOB ,WILL CONT TO 
MONITOR CLOSELY

## 2018-01-30 NOTE — NUR
TOMÁS RN CLOSING NOTES



PATIENT RESTING COMFORTABLY.  CURRENTLY AFLUTTER 77.  NO RESPIRATORY DISTRESS NOTED.  
TOLERATING CURRENT VENT SETTINGS.  TOLERATING GTF.   SKIN WARM AND DRY  TO TOUCH.  KEPT 
CLEAN AND DRY.  TURNED AND REPOSITIONED Q2 AND PRN.  Z GUARD APPLIED AS NEEDED.  CONTINUITY 
OF CARE ENDORSED TO AM NURSE.

## 2018-01-30 NOTE — NUR
TELE RN NOTE

  C\P ANXIOUS AND SOB  ,SPOKE WITH CHRISTAL STATED OK TO GIVE ATIVAN, RT AT BEDSIDE ,SAT 96%5

-------------------------------------------------------------------------------

Addendum: 01/30/18 at 1725 by YOLY RADER RN

-------------------------------------------------------------------------------

 ATIVAN 1 MG VIA G TUBE GIVEN AS ORDERED, AT BEDSIDE, WILL CONT TO MONITOR CLOSELY

## 2018-01-30 NOTE — NUR
PATIENT STATES CHEST PAIN IS SUBSIDING AFTER NITROGLYCERIN.  C/O HEADACHE AND NAUSEA.   COLD 
TOWEL PLACED ON HEAD, PRN ZOFRAN AND TYLENOL GIVEN.  WILL CONTINUE TO MONITOR.

## 2018-01-30 NOTE — NUR
TOMÁS RN NOTE 

 PATIENT IN BED , ALL NEEDS ATTENDED, RESTING COMFORTABLY IN  BED FOR NOW, ON TELE MONITOR A 
FLATTER HR 70 . ON  G TUBE FEEDING AS ORDERED ,KEEP HOB ELEVATED AT ALL TIME , BED IN LOWERS 
AND LOCKED POSITION , CALL LIGHT WITHIN REACH, WILL CONT TO MONITOR CLOSELY

## 2018-01-31 VITALS — DIASTOLIC BLOOD PRESSURE: 55 MMHG | SYSTOLIC BLOOD PRESSURE: 105 MMHG

## 2018-01-31 VITALS — DIASTOLIC BLOOD PRESSURE: 69 MMHG | SYSTOLIC BLOOD PRESSURE: 112 MMHG

## 2018-01-31 VITALS — DIASTOLIC BLOOD PRESSURE: 75 MMHG | SYSTOLIC BLOOD PRESSURE: 139 MMHG

## 2018-01-31 VITALS — SYSTOLIC BLOOD PRESSURE: 133 MMHG | DIASTOLIC BLOOD PRESSURE: 77 MMHG

## 2018-01-31 VITALS — DIASTOLIC BLOOD PRESSURE: 63 MMHG | SYSTOLIC BLOOD PRESSURE: 123 MMHG

## 2018-01-31 VITALS — DIASTOLIC BLOOD PRESSURE: 75 MMHG | SYSTOLIC BLOOD PRESSURE: 114 MMHG

## 2018-01-31 LAB
ALBUMIN SERPL BCP-MCNC: 2.9 G/DL (ref 3.4–5)
ALP SERPL-CCNC: 155 U/L (ref 46–116)
ALT SERPL W P-5'-P-CCNC: 72 U/L (ref 12–78)
AST SERPL W P-5'-P-CCNC: 36 U/L (ref 15–37)
BASE EXCESS BLDA CALC-SCNC: 3.7 MMOL/L
BASOPHILS # BLD AUTO: 0 /CMM (ref 0–0.2)
BASOPHILS NFR BLD AUTO: 0.1 % (ref 0–2)
BILIRUB SERPL-MCNC: 0.5 MG/DL (ref 0.2–1)
BUN SERPL-MCNC: 44 MG/DL (ref 7–18)
CALCIUM SERPL-MCNC: 9.1 MG/DL (ref 8.5–10.1)
CHLORIDE SERPL-SCNC: 105 MMOL/L (ref 98–107)
CO2 SERPL-SCNC: 33 MMOL/L (ref 21–32)
CREAT SERPL-MCNC: 0.7 MG/DL (ref 0.6–1.3)
DO-HGB MFR BLDA: 57 MMHG
EOSINOPHIL # BLD AUTO: 0 /CMM (ref 0–0.7)
EOSINOPHIL NFR BLD AUTO: 0 % (ref 0–6)
GLUCOSE SERPL-MCNC: 128 MG/DL (ref 74–106)
HCT VFR BLD AUTO: 39 % (ref 33–45)
HGB BLD-MCNC: 12.5 G/DL (ref 11.5–14.8)
INHALED O2 CONCENTRATION: 28 %
INTRINSIC PEEP RESPIRATORY: 0 CM H2O
LYMPHOCYTES NFR BLD AUTO: 0.9 /CMM (ref 0.8–4.8)
LYMPHOCYTES NFR BLD AUTO: 5.3 % (ref 20–44)
LYMPHOCYTES NFR BLD MANUAL: 2 % (ref 16–48)
MAGNESIUM SERPL-MCNC: 2.1 MG/DL (ref 1.8–2.4)
MCH RBC QN AUTO: 28 PG (ref 26–33)
MCHC RBC AUTO-ENTMCNC: 32 G/DL (ref 31–36)
MCV RBC AUTO: 88 FL (ref 82–100)
MONOCYTES NFR BLD AUTO: 0.7 /CMM (ref 0.1–1.3)
MONOCYTES NFR BLD AUTO: 4 % (ref 2–12)
MONOCYTES NFR BLD MANUAL: 10 % (ref 0–11)
NEUTROPHILS # BLD AUTO: 16 /CMM (ref 1.8–8.9)
NEUTROPHILS NFR BLD AUTO: 90.6 % (ref 43–81)
NEUTS BAND NFR BLD MANUAL: 1 % (ref 0–5)
NEUTS SEG NFR BLD MANUAL: 87 % (ref 42–76)
PCO2 TEMP ADJ BLDA: 43.5 MMHG (ref 35–45)
PH TEMP ADJ BLDA: 7.43 [PH] (ref 7.35–7.45)
PHOSPHATE SERPL-MCNC: 2.3 MG/DL (ref 2.5–4.9)
PLATELET # BLD AUTO: 268 /CMM (ref 150–450)
PO2 TEMP ADJ BLDA: 91.3 MMHG (ref 75–100)
POTASSIUM SERPL-SCNC: 4.2 MMOL/L (ref 3.5–5.1)
PROT SERPL-MCNC: 6.7 G/DL (ref 6.4–8.2)
RBC # BLD AUTO: 4.4 MIL/UL (ref 4–5.2)
RDW COEFFICIENT OF VARIATION: 16.7 (ref 11.5–15)
SAO2 % BLDA: 96.5 % (ref 92–98.5)
SODIUM SERPL-SCNC: 142 MMOL/L (ref 136–145)
VENTILATION MODE VENT: (no result)
WBC NRBC COR # BLD AUTO: 17.6 K/UL (ref 4.3–11)

## 2018-01-31 RX ADMIN — Medication PRN MG: at 13:32

## 2018-01-31 RX ADMIN — DILTIAZEM HYDROCHLORIDE SCH MG: 30 TABLET, FILM COATED ORAL at 17:41

## 2018-01-31 RX ADMIN — Medication SCH MG: at 23:22

## 2018-01-31 RX ADMIN — METOPROLOL TARTRATE SCH MG: 25 TABLET, FILM COATED ORAL at 22:15

## 2018-01-31 RX ADMIN — RIVAROXABAN SCH MG: 10 TABLET, FILM COATED ORAL at 17:46

## 2018-01-31 RX ADMIN — Medication PRN MG: at 19:48

## 2018-01-31 RX ADMIN — Medication SCH MG: at 19:13

## 2018-01-31 RX ADMIN — Medication SCH MG: at 16:58

## 2018-01-31 RX ADMIN — Medication PRN ML: at 06:47

## 2018-01-31 RX ADMIN — DILTIAZEM HYDROCHLORIDE SCH MG: 30 TABLET, FILM COATED ORAL at 00:12

## 2018-01-31 RX ADMIN — Medication SCH MG: at 09:23

## 2018-01-31 RX ADMIN — FOLIC ACID SCH MG: 1 TABLET ORAL at 09:25

## 2018-01-31 RX ADMIN — DOCUSATE SODIUM SCH MG: 50 LIQUID ORAL at 09:26

## 2018-01-31 RX ADMIN — METOPROLOL TARTRATE SCH MG: 25 TABLET, FILM COATED ORAL at 09:25

## 2018-01-31 RX ADMIN — DILTIAZEM HYDROCHLORIDE SCH MG: 30 TABLET, FILM COATED ORAL at 05:21

## 2018-01-31 RX ADMIN — ATORVASTATIN CALCIUM SCH MG: 10 TABLET, FILM COATED ORAL at 22:14

## 2018-01-31 RX ADMIN — Medication SCH MG: at 12:50

## 2018-01-31 RX ADMIN — LORAZEPAM PRN MG: 2 INJECTION INTRAMUSCULAR; INTRAVENOUS at 03:37

## 2018-01-31 RX ADMIN — Medication SCH MG: at 03:24

## 2018-01-31 RX ADMIN — Medication PRN MG: at 06:42

## 2018-01-31 RX ADMIN — DILTIAZEM HYDROCHLORIDE SCH MG: 30 TABLET, FILM COATED ORAL at 12:05

## 2018-01-31 RX ADMIN — Medication SCH MG: at 09:25

## 2018-01-31 RX ADMIN — LORAZEPAM PRN MG: 2 INJECTION INTRAMUSCULAR; INTRAVENOUS at 22:15

## 2018-01-31 RX ADMIN — Medication SCH MG: at 07:30

## 2018-01-31 RX ADMIN — FERROUS SULFATE TAB 325 MG (65 MG ELEMENTAL FE) SCH MG: 325 (65 FE) TAB at 09:23

## 2018-01-31 RX ADMIN — PANTOPRAZOLE SODIUM SCH MG: 40 GRANULE, DELAYED RELEASE ORAL at 09:25

## 2018-01-31 RX ADMIN — ASPIRIN 81 MG SCH MG: 81 TABLET ORAL at 09:25

## 2018-01-31 RX ADMIN — LORAZEPAM PRN MG: 2 INJECTION INTRAMUSCULAR; INTRAVENOUS at 11:06

## 2018-01-31 RX ADMIN — LEVOFLOXACIN SCH MG: 500 TABLET, FILM COATED ORAL at 22:23

## 2018-01-31 NOTE — NUR
RT NOTE



TRACH PT RCVD ON VENT WITH NOTED SETTINGS. TRACH IS PATENT AND SECURED. MLT DONE. PT IS 
ALERT AND AWAKE. Q4 BREATHING TX GIVEN PER MD'S ORDERED WITH NO ADVERSE REACTION. VENTS 
PLUGGED INTO RED OUTLET, VENT ALARM WORKING AND AUDIBLE. AMBU BAG BY BEDSIDE. SUCTIONED 
MODERATE AMOUNT OF YELLOW THICK SECRETIONS. NO RESPIRATORY DISTRESS NOTED AT THIS TIME. WILL 
CONTINUE TO MONITOR THE PT.

## 2018-01-31 NOTE — NUR
TELE RN INITIAL NOTE



PT RECEIVED UP IN BED. A/O X3 AND ABLE TO MOUTH WORDS. ON MECH VENT AND SATURATING WELL. HOB 
ELEVATED AND ON ASPIRATION PRECAUTIONS. GTUBE IN PLACE AND FEEDING WELL TOLERATED WITH NO 
RESIDUALS NOTED. CALL LIGHT WITHIN REACH. WILL CONTINUE TO MONITOR.

## 2018-01-31 NOTE — NUR
VENT CHANGES BELLOW AS ORDER:



NO PEEP PER DR. ARBOLEDA.

-------------------------------------------------------------------------------

Addendum: 01/31/18 at 1153 by RACHEL SAVAGE RT

-------------------------------------------------------------------------------

Amended: Links added.

## 2018-01-31 NOTE — NUR
RT NOTE



PATIENT RECEIVED ON AC 28%. TRACH SECURED AND PATENT. ALARMS ON AND AUDIBLE. HME REPLACED AT 
END OF SHIFT. NO SOB NOTED. VENTILATION PLUGGED IN RED OUTLET.

-------------------------------------------------------------------------------

Addendum: 01/31/18 at 1841 by JIMMY HUNTLEY RT

-------------------------------------------------------------------------------

Amended: Links added.

## 2018-02-01 VITALS — DIASTOLIC BLOOD PRESSURE: 79 MMHG | SYSTOLIC BLOOD PRESSURE: 134 MMHG

## 2018-02-01 VITALS — SYSTOLIC BLOOD PRESSURE: 131 MMHG | DIASTOLIC BLOOD PRESSURE: 82 MMHG

## 2018-02-01 VITALS — DIASTOLIC BLOOD PRESSURE: 58 MMHG | SYSTOLIC BLOOD PRESSURE: 124 MMHG

## 2018-02-01 VITALS — SYSTOLIC BLOOD PRESSURE: 141 MMHG | DIASTOLIC BLOOD PRESSURE: 72 MMHG

## 2018-02-01 VITALS — SYSTOLIC BLOOD PRESSURE: 126 MMHG | DIASTOLIC BLOOD PRESSURE: 69 MMHG

## 2018-02-01 VITALS — SYSTOLIC BLOOD PRESSURE: 151 MMHG | DIASTOLIC BLOOD PRESSURE: 80 MMHG

## 2018-02-01 LAB
BASOPHILS # BLD AUTO: 0 /CMM (ref 0–0.2)
BASOPHILS NFR BLD AUTO: 0.1 % (ref 0–2)
BUN SERPL-MCNC: 33 MG/DL (ref 7–18)
CALCIUM SERPL-MCNC: 8.6 MG/DL (ref 8.5–10.1)
CHLORIDE SERPL-SCNC: 105 MMOL/L (ref 98–107)
CO2 SERPL-SCNC: 32 MMOL/L (ref 21–32)
CREAT SERPL-MCNC: 0.6 MG/DL (ref 0.6–1.3)
EOSINOPHIL # BLD AUTO: 0 /CMM (ref 0–0.7)
EOSINOPHIL NFR BLD AUTO: 0 % (ref 0–6)
GLUCOSE SERPL-MCNC: 105 MG/DL (ref 74–106)
HCT VFR BLD AUTO: 38 % (ref 33–45)
HGB BLD-MCNC: 12.3 G/DL (ref 11.5–14.8)
LYMPHOCYTES NFR BLD AUTO: 1.1 /CMM (ref 0.8–4.8)
LYMPHOCYTES NFR BLD AUTO: 6.3 % (ref 20–44)
MCH RBC QN AUTO: 28 PG (ref 26–33)
MCHC RBC AUTO-ENTMCNC: 32 G/DL (ref 31–36)
MCV RBC AUTO: 89 FL (ref 82–100)
MONOCYTES NFR BLD AUTO: 0.3 /CMM (ref 0.1–1.3)
MONOCYTES NFR BLD AUTO: 1.5 % (ref 2–12)
NEUTROPHILS # BLD AUTO: 16.4 /CMM (ref 1.8–8.9)
NEUTROPHILS NFR BLD AUTO: 92.1 % (ref 43–81)
PLATELET # BLD AUTO: 270 /CMM (ref 150–450)
POTASSIUM SERPL-SCNC: 4.6 MMOL/L (ref 3.5–5.1)
RBC # BLD AUTO: 4.32 MIL/UL (ref 4–5.2)
RDW COEFFICIENT OF VARIATION: 16.7 (ref 11.5–15)
SODIUM SERPL-SCNC: 142 MMOL/L (ref 136–145)
WBC NRBC COR # BLD AUTO: 17.8 K/UL (ref 4.3–11)

## 2018-02-01 RX ADMIN — ATORVASTATIN CALCIUM SCH MG: 10 TABLET, FILM COATED ORAL at 21:29

## 2018-02-01 RX ADMIN — FERROUS SULFATE TAB 325 MG (65 MG ELEMENTAL FE) SCH MG: 325 (65 FE) TAB at 09:40

## 2018-02-01 RX ADMIN — PANTOPRAZOLE SODIUM SCH MG: 40 GRANULE, DELAYED RELEASE ORAL at 09:40

## 2018-02-01 RX ADMIN — Medication PRN MG: at 17:06

## 2018-02-01 RX ADMIN — DILTIAZEM HYDROCHLORIDE SCH MG: 30 TABLET, FILM COATED ORAL at 06:24

## 2018-02-01 RX ADMIN — Medication SCH MG: at 11:36

## 2018-02-01 RX ADMIN — METOPROLOL TARTRATE SCH MG: 25 TABLET, FILM COATED ORAL at 20:55

## 2018-02-01 RX ADMIN — Medication PRN MG: at 21:29

## 2018-02-01 RX ADMIN — Medication SCH MG: at 20:28

## 2018-02-01 RX ADMIN — Medication SCH MG: at 08:03

## 2018-02-01 RX ADMIN — ASPIRIN 81 MG SCH MG: 81 TABLET ORAL at 09:40

## 2018-02-01 RX ADMIN — METOPROLOL TARTRATE SCH MG: 25 TABLET, FILM COATED ORAL at 09:41

## 2018-02-01 RX ADMIN — Medication SCH MG: at 04:05

## 2018-02-01 RX ADMIN — DILTIAZEM HYDROCHLORIDE SCH MG: 30 TABLET, FILM COATED ORAL at 12:55

## 2018-02-01 RX ADMIN — Medication SCH MG: at 09:40

## 2018-02-01 RX ADMIN — Medication PRN ML: at 06:24

## 2018-02-01 RX ADMIN — DILTIAZEM HYDROCHLORIDE SCH MG: 30 TABLET, FILM COATED ORAL at 17:05

## 2018-02-01 RX ADMIN — Medication SCH MG: at 15:19

## 2018-02-01 RX ADMIN — FOLIC ACID SCH MG: 1 TABLET ORAL at 09:40

## 2018-02-01 RX ADMIN — DOCUSATE SODIUM SCH MG: 50 LIQUID ORAL at 09:39

## 2018-02-01 RX ADMIN — Medication PRN MG: at 08:29

## 2018-02-01 RX ADMIN — Medication PRN MG: at 12:56

## 2018-02-01 RX ADMIN — Medication PRN MG: at 02:03

## 2018-02-01 RX ADMIN — DILTIAZEM HYDROCHLORIDE SCH MG: 30 TABLET, FILM COATED ORAL at 00:57

## 2018-02-01 RX ADMIN — RIVAROXABAN SCH MG: 10 TABLET, FILM COATED ORAL at 17:06

## 2018-02-01 RX ADMIN — LORAZEPAM PRN MG: 2 INJECTION INTRAMUSCULAR; INTRAVENOUS at 18:39

## 2018-02-01 NOTE — NUR
TELE RN CLOSING NOTE



PT REMAINED STABLE DURING SHIFT. PAIN MANAGED THROUGHOUT THE SHIFT. NO ACUTE DISTRESS NOTED. 
ALL NEEDS ATTENDED TO PROMPTLY. SUCTIONED AS NEEDED. REPOSITIONED Q2H. HOB REMAINED 
ELEVATED. CALL LIGHT WITHIN REACH. WILL ENDORSE TO NEXT SHIFT FOR CONTINUITY OF CARE.

## 2018-02-01 NOTE — NUR
TELE RN AM NOTES



RECEIVED PATIENT IN BED, AWAKE, RESPONDS TO NAME AND TOUCH, AO X3, ABLE TO MAKE NEEDS KNOWN, 
WITH PORTEX 8 IN PLACE. TRACH TO VENT SETTING AS ORDERED, VENT PLUGGED TO RED OUTLET. 
TOLERATING SETTINGS WELL. TELEMETRY READS AFIB , NO SIGNS OF PAIN, LFA G 22,  G-TUBE FEEDING 
NUTREN AT 45 ML/HR ONGOING, HOB ELEVATED, SAFETY MEASURES TAKEN, PAIN LEVEL 7/10, CALL LIGHT 
WITHIN REACH. WILL, BED IN THE LOW POSITION, WILL CONTINUE TO MONITOR

## 2018-02-01 NOTE — NUR
TELE RN CLOSING NOTES



PATIENT IN BED RESTING COMFORTABLY, AO X3, ABLE TO MAKE NEEDS KNOWN, WITH PORTEX 8 IN PLACE. 
TRACH TO VENT SETTING AS ORDERED. TOLERATING SETTINGS WELL. TELEMETRY READS AFIB HR 90, NO 
SIGNS OF PAIN, LFA G 22, FLUSHES WELL SITE CLEAR. G-TUBE FEEDING NUTREN AT 45 ML/HR ONGOING, 
O RESIDUAL. HOB ELEVATED, SAFETY MEASURES OBSERVED. CALL LIGHT WITHIN REACH. ALL NEEDS MET. 
ASSISTED WITH TURNING AND REPOSITIONING EVERY 2 HOURS. PM CARE DONE. ENDORSED TO NEXT SHIFT 
FOR PHONG.

## 2018-02-01 NOTE — NUR
TELE RN AM NOTES



RECEIVED PATIENT IN BED, ASLEEP, RESPONDS TO NAME AND TOUCH, AO X3, ABLE TO MAKE NEEDS 
KNOWN, WITH PORTEX 8 IN PLACE. TRACH TO VENT SETTING AS ORDERED. TOLERATING SETTINGS WELL. 
TELEMETRY READS AFIB HR 90, NO SIGNS OF PAIN, LFA G 22, FLUSHES WELL SITE CLEAR. G-TUBE 
FEEDING NUTREN AT 45 ML/HR ONGOING, O RESIDUAL. HOB ELEVATED, SAFETY MEASURES OBSERVED. CALL 
LIGHT WITHIN REACH. WILL CONTINUE TO MONITOR.

## 2018-02-02 VITALS — SYSTOLIC BLOOD PRESSURE: 123 MMHG | DIASTOLIC BLOOD PRESSURE: 73 MMHG

## 2018-02-02 VITALS — SYSTOLIC BLOOD PRESSURE: 135 MMHG | DIASTOLIC BLOOD PRESSURE: 76 MMHG

## 2018-02-02 VITALS — SYSTOLIC BLOOD PRESSURE: 133 MMHG | DIASTOLIC BLOOD PRESSURE: 78 MMHG

## 2018-02-02 VITALS — SYSTOLIC BLOOD PRESSURE: 130 MMHG | DIASTOLIC BLOOD PRESSURE: 66 MMHG

## 2018-02-02 VITALS — DIASTOLIC BLOOD PRESSURE: 69 MMHG | SYSTOLIC BLOOD PRESSURE: 98 MMHG

## 2018-02-02 VITALS — DIASTOLIC BLOOD PRESSURE: 77 MMHG | SYSTOLIC BLOOD PRESSURE: 144 MMHG

## 2018-02-02 RX ADMIN — LORAZEPAM PRN MG: 1 TABLET ORAL at 02:08

## 2018-02-02 RX ADMIN — DILTIAZEM HYDROCHLORIDE SCH MG: 30 TABLET, FILM COATED ORAL at 23:47

## 2018-02-02 RX ADMIN — Medication PRN ML: at 05:37

## 2018-02-02 RX ADMIN — DILTIAZEM HYDROCHLORIDE SCH MG: 30 TABLET, FILM COATED ORAL at 00:04

## 2018-02-02 RX ADMIN — DILTIAZEM HYDROCHLORIDE SCH MG: 30 TABLET, FILM COATED ORAL at 17:02

## 2018-02-02 RX ADMIN — Medication PRN MG: at 05:14

## 2018-02-02 RX ADMIN — Medication PRN MG: at 10:11

## 2018-02-02 RX ADMIN — Medication SCH MG: at 16:20

## 2018-02-02 RX ADMIN — ASPIRIN 81 MG SCH MG: 81 TABLET ORAL at 09:58

## 2018-02-02 RX ADMIN — DILTIAZEM HYDROCHLORIDE SCH MG: 30 TABLET, FILM COATED ORAL at 05:15

## 2018-02-02 RX ADMIN — PANTOPRAZOLE SODIUM SCH MG: 40 GRANULE, DELAYED RELEASE ORAL at 09:58

## 2018-02-02 RX ADMIN — METOPROLOL TARTRATE SCH MG: 25 TABLET, FILM COATED ORAL at 09:58

## 2018-02-02 RX ADMIN — Medication PRN MG: at 19:23

## 2018-02-02 RX ADMIN — ATORVASTATIN CALCIUM SCH MG: 10 TABLET, FILM COATED ORAL at 21:48

## 2018-02-02 RX ADMIN — DILTIAZEM HYDROCHLORIDE SCH MG: 30 TABLET, FILM COATED ORAL at 12:12

## 2018-02-02 RX ADMIN — FOLIC ACID SCH MG: 1 TABLET ORAL at 10:01

## 2018-02-02 RX ADMIN — Medication SCH MG: at 10:32

## 2018-02-02 RX ADMIN — DOCUSATE SODIUM SCH MG: 50 LIQUID ORAL at 09:59

## 2018-02-02 RX ADMIN — Medication SCH MG: at 19:27

## 2018-02-02 RX ADMIN — Medication SCH MG: at 09:59

## 2018-02-02 RX ADMIN — LEVOFLOXACIN SCH MG: 500 TABLET, FILM COATED ORAL at 00:07

## 2018-02-02 RX ADMIN — Medication SCH MG: at 00:16

## 2018-02-02 RX ADMIN — RIVAROXABAN SCH MG: 10 TABLET, FILM COATED ORAL at 17:01

## 2018-02-02 RX ADMIN — Medication SCH MG: at 09:58

## 2018-02-02 RX ADMIN — Medication SCH MG: at 03:30

## 2018-02-02 RX ADMIN — LEVOFLOXACIN SCH MG: 500 TABLET, FILM COATED ORAL at 23:47

## 2018-02-02 RX ADMIN — METOPROLOL TARTRATE SCH MG: 25 TABLET, FILM COATED ORAL at 21:48

## 2018-02-02 RX ADMIN — FERROUS SULFATE TAB 325 MG (65 MG ELEMENTAL FE) SCH MG: 325 (65 FE) TAB at 09:58

## 2018-02-02 RX ADMIN — Medication PRN MG: at 14:23

## 2018-02-02 RX ADMIN — Medication SCH MG: at 07:50

## 2018-02-02 RX ADMIN — LORAZEPAM PRN MG: 1 TABLET ORAL at 10:12

## 2018-02-02 NOTE — NUR
RN CLOSING NOTES



PATIENT IN BED RESTING COMFORTABLY, asleep  AO X3, ABLE TO COMMUNICATE PORTEX 8 IN PLACE, 
PER DR VILLAFUERTE FIO2 50%, MECHANICAL VENTILATOR PLUGED IN THE RED OUTLET AND WORKING PROPERLY, 
TOLERATES SETTINGS WELL. TELEMETRY READS A-FLUTTER HR 76, NO , LFA G 22, RFA 22 GAUGE, 
FLUSHES WELL SITE CLEAR. G-TUBE FEEDING NUTREN AT 45 ML/HR ONGOING, O RESIDUAL. HOB 
ELEVATED, SAFETY MEASURES OBSERVED. CALL LIGHT WITHIN REACH. ALL NEEDS MET. ASSISTED WITH 
TURNING AND REPOSITIONING EVERY 2 HOURS. PM CARE PERFORMED. ENDORSED TO AM NURSE FOR PHONG.

## 2018-02-02 NOTE — NUR
TELE RN CLOSING NOTES



PATIENT IN BED RESTING COMFORTABLY, AO X3, ABLE TO MAKE NEEDS KNOWN, WITH PORTEX 8 IN PLACE. 
TRACH TO VENT SETTING AS ORDERED. TOLERATING SETTINGS WELL. TELEMETRY READS AFIB HR 92, NO 
SIGNS OF PAIN, LFA G 22, FLUSHES WELL SITE CLEAR. G-TUBE FEEDING NUTREN AT 45 ML/HR ONGOING, 
O RESIDUAL. HOB ELEVATED, SAFETY MEASURES OBSERVED. CALL LIGHT WITHIN REACH. ALL NEEDS MET. 
ASSISTED WITH TURNING AND REPOSITIONING EVERY 2 HOURS. PM CARE DONE. ENDORSED TO NEXT SHIFT 
FOR PHONG.

## 2018-02-02 NOTE — NUR
RT

Pt trach remains on [facility] vent ordered settings. no resp distress.

-------------------------------------------------------------------------------

Addendum: 02/02/18 at 0557 by ROSALINA LYONS RT

-------------------------------------------------------------------------------

Amended: Links added.

## 2018-02-02 NOTE — NUR
TELE RN NOTES

RECEIVED PATIENT IN BED, AWAKE, WATCHING TV AT THIS TIME. AO X3, ABLE TO MAKE NEEDS KNOWN. 
TRACH WITH VENT, SUCTIONED PRN.  NO C/O PAIN OR DISCOMFORT AT THIS TIME.  FIOR G 22 AND LFA G 
22, FLUSHES WELL. NO S/S OF INFILTRATION NOTED. GTF INTACT AND PATENT, GTF ONGOING, FILIPE 
WELL. HOB ELEVATED, SAFETY AND ASPIRATION PRECAUTION OBSERVED. CALL LIGHT WITHIN REACH. WILL 
CONTINUE TO MONITOR.

## 2018-02-02 NOTE — NUR
TELE RN AM NOTES



RECEIVED PATIENT IN BED, ASLEEP, RESPONDS TO NAME AND TOUCH, AO X3, ABLE TO MAKE NEEDS 
KNOWN, WITH PORTEX 8 IN PLACE. TRACH TO VENT SETTING AS ORDERED. TOLERATING SETTINGS WELL. 
TELEMETRY READS A FLUTTER HR 98, NO SIGNS OF PAIN, FIOR G 22 AND LFA G 22, FLUSHES WELL SITE 
CLEAR. G-TUBE FEEDING NUTREN AT 45 ML/HR ONGOING, O RESIDUAL. HOB ELEVATED, SAFETY MEASURES 
OBSERVED. CALL LIGHT WITHIN REACH. WILL CONTINUE TO MONITOR.

## 2018-02-03 VITALS — DIASTOLIC BLOOD PRESSURE: 64 MMHG | SYSTOLIC BLOOD PRESSURE: 122 MMHG

## 2018-02-03 VITALS — SYSTOLIC BLOOD PRESSURE: 126 MMHG | DIASTOLIC BLOOD PRESSURE: 83 MMHG

## 2018-02-03 VITALS — DIASTOLIC BLOOD PRESSURE: 79 MMHG | SYSTOLIC BLOOD PRESSURE: 124 MMHG

## 2018-02-03 VITALS — DIASTOLIC BLOOD PRESSURE: 61 MMHG | SYSTOLIC BLOOD PRESSURE: 116 MMHG

## 2018-02-03 VITALS — DIASTOLIC BLOOD PRESSURE: 61 MMHG | SYSTOLIC BLOOD PRESSURE: 111 MMHG

## 2018-02-03 VITALS — SYSTOLIC BLOOD PRESSURE: 133 MMHG | DIASTOLIC BLOOD PRESSURE: 73 MMHG

## 2018-02-03 LAB
BASOPHILS # BLD AUTO: 0 /CMM (ref 0–0.2)
BASOPHILS NFR BLD AUTO: 0.1 % (ref 0–2)
BUN SERPL-MCNC: 34 MG/DL (ref 7–18)
CALCIUM SERPL-MCNC: 8.7 MG/DL (ref 8.5–10.1)
CHLORIDE SERPL-SCNC: 102 MMOL/L (ref 98–107)
CO2 SERPL-SCNC: 33 MMOL/L (ref 21–32)
CREAT SERPL-MCNC: 0.6 MG/DL (ref 0.6–1.3)
EOSINOPHIL # BLD AUTO: 0 /CMM (ref 0–0.7)
EOSINOPHIL NFR BLD AUTO: 0 % (ref 0–6)
GLUCOSE SERPL-MCNC: 146 MG/DL (ref 74–106)
HCT VFR BLD AUTO: 42 % (ref 33–45)
HGB BLD-MCNC: 13.4 G/DL (ref 11.5–14.8)
LYMPHOCYTES NFR BLD AUTO: 0.5 /CMM (ref 0.8–4.8)
LYMPHOCYTES NFR BLD AUTO: 2.8 % (ref 20–44)
MAGNESIUM SERPL-MCNC: 2.1 MG/DL (ref 1.8–2.4)
MCH RBC QN AUTO: 28 PG (ref 26–33)
MCHC RBC AUTO-ENTMCNC: 32 G/DL (ref 31–36)
MCV RBC AUTO: 88 FL (ref 82–100)
MONOCYTES NFR BLD AUTO: 0.1 /CMM (ref 0.1–1.3)
MONOCYTES NFR BLD AUTO: 0.6 % (ref 2–12)
NEUTROPHILS # BLD AUTO: 18.2 /CMM (ref 1.8–8.9)
NEUTROPHILS NFR BLD AUTO: 96.5 % (ref 43–81)
PHOSPHATE SERPL-MCNC: 3.1 MG/DL (ref 2.5–4.9)
PLATELET # BLD AUTO: 331 /CMM (ref 150–450)
POTASSIUM SERPL-SCNC: 4.2 MMOL/L (ref 3.5–5.1)
RBC # BLD AUTO: 4.78 MIL/UL (ref 4–5.2)
RDW COEFFICIENT OF VARIATION: 17.3 (ref 11.5–15)
SODIUM SERPL-SCNC: 140 MMOL/L (ref 136–145)
WBC NRBC COR # BLD AUTO: 18.9 K/UL (ref 4.3–11)

## 2018-02-03 RX ADMIN — METOPROLOL TARTRATE SCH MG: 25 TABLET, FILM COATED ORAL at 08:42

## 2018-02-03 RX ADMIN — Medication PRN ML: at 06:30

## 2018-02-03 RX ADMIN — METOPROLOL TARTRATE SCH MG: 25 TABLET, FILM COATED ORAL at 22:00

## 2018-02-03 RX ADMIN — DILTIAZEM HYDROCHLORIDE SCH MG: 30 TABLET, FILM COATED ORAL at 06:15

## 2018-02-03 RX ADMIN — Medication SCH MG: at 08:35

## 2018-02-03 RX ADMIN — DILTIAZEM HYDROCHLORIDE SCH MG: 30 TABLET, FILM COATED ORAL at 13:38

## 2018-02-03 RX ADMIN — Medication SCH MG: at 19:54

## 2018-02-03 RX ADMIN — PANTOPRAZOLE SODIUM SCH MG: 40 GRANULE, DELAYED RELEASE ORAL at 08:35

## 2018-02-03 RX ADMIN — DOCUSATE SODIUM SCH MG: 50 LIQUID ORAL at 08:34

## 2018-02-03 RX ADMIN — Medication SCH MG: at 00:03

## 2018-02-03 RX ADMIN — RIVAROXABAN SCH MG: 10 TABLET, FILM COATED ORAL at 18:17

## 2018-02-03 RX ADMIN — Medication SCH MG: at 15:54

## 2018-02-03 RX ADMIN — Medication PRN MG: at 18:19

## 2018-02-03 RX ADMIN — Medication PRN MG: at 01:29

## 2018-02-03 RX ADMIN — LEVOFLOXACIN SCH MG: 500 TABLET, FILM COATED ORAL at 23:00

## 2018-02-03 RX ADMIN — Medication SCH MG: at 11:47

## 2018-02-03 RX ADMIN — ASPIRIN 81 MG SCH MG: 81 TABLET ORAL at 08:36

## 2018-02-03 RX ADMIN — FOLIC ACID SCH MG: 1 TABLET ORAL at 08:35

## 2018-02-03 RX ADMIN — DILTIAZEM HYDROCHLORIDE SCH MG: 30 TABLET, FILM COATED ORAL at 18:16

## 2018-02-03 RX ADMIN — LORAZEPAM PRN MG: 1 TABLET ORAL at 03:49

## 2018-02-03 RX ADMIN — FERROUS SULFATE TAB 325 MG (65 MG ELEMENTAL FE) SCH MG: 325 (65 FE) TAB at 08:34

## 2018-02-03 RX ADMIN — Medication SCH MG: at 23:10

## 2018-02-03 RX ADMIN — Medication SCH MG: at 03:34

## 2018-02-03 RX ADMIN — ATORVASTATIN CALCIUM SCH MG: 10 TABLET, FILM COATED ORAL at 22:00

## 2018-02-03 RX ADMIN — Medication SCH MG: at 07:51

## 2018-02-03 NOTE — NUR
TELE RN NOTES

PATIENT IN BED, AWAKE, RESTING COMFORTABLY AT THIS TIME, AROUSES EASILY. A/O X3, ABLE TO 
MAKE NEEDS KNOWN. TRACH WITH VENT, SUCTIONED PRN.  NO C/O PAIN OR DISCOMFORT AT THIS TIME.  
FIOR G 22 AND LFA G 22, FLUSHES WELL. NO S/S OF INFILTRATION NOTED. GTF INTACT AND PATENT, 
GTF ONGOING, FILIPE WELL. HOB ELEVATED, SAFETY AND ASPIRATION PRECAUTION OBSERVED. CALL LIGHT 
WITHIN REACH. WILL ENDORSE TO NEXT SHIFT FOR PHONG.

## 2018-02-03 NOTE — NUR
Tele RN Notes

Received pt. in bed.A/O X 3.FIOR PICC LINE CDI AND PATENT.NPO STATUS FOR EGD TODAY.HOB 
ELEVATED .BED IN LOW POSITION.CALL LIGHT WITHIN REACH .WILL CONTINUE TO MONITOR FOR CHANGES.

## 2018-02-03 NOTE — NUR
PT RCVD ON VENT WITH NOTED SETTINGS. VENTS PLUGGED INTO RED OUTLET, VENT ALARM WORKING AND 
AUDIBLE. BREATHING TX GIVEN PER MD'S ORDERED. NO ADVERSE REACTION AT THIS TIME. SUCTIONED 
MODERATE AMOUNT OF PALE YELLOW THICK SECRETIONS. BILATERAL BS NOTED, NO RESPIRATORY DISTRESS 
NOTED AT THIS TIME. WILL CONTINUE TO MONITOR THE PT.

## 2018-02-03 NOTE — NUR
RECEIVED PT TRACH ON MECHANICAL VENTILATOR ON SETTINGS PER MD ORDER. PT HAS NO SOB/RESP 
DISTRESS. AIRWAY PATENT AND SECURE. VENT ALARMS SET. PT BREATH SOUNDS BILATERAL COARSE. 
SUCTIONED YELLOW, TAN THICK MODERATED AMOUNT OF SECRETIONS.

## 2018-02-04 VITALS — DIASTOLIC BLOOD PRESSURE: 59 MMHG | SYSTOLIC BLOOD PRESSURE: 103 MMHG

## 2018-02-04 VITALS — SYSTOLIC BLOOD PRESSURE: 121 MMHG | DIASTOLIC BLOOD PRESSURE: 57 MMHG

## 2018-02-04 VITALS — SYSTOLIC BLOOD PRESSURE: 114 MMHG | DIASTOLIC BLOOD PRESSURE: 68 MMHG

## 2018-02-04 VITALS — SYSTOLIC BLOOD PRESSURE: 116 MMHG | DIASTOLIC BLOOD PRESSURE: 67 MMHG

## 2018-02-04 VITALS — SYSTOLIC BLOOD PRESSURE: 94 MMHG | DIASTOLIC BLOOD PRESSURE: 47 MMHG

## 2018-02-04 VITALS — SYSTOLIC BLOOD PRESSURE: 107 MMHG | DIASTOLIC BLOOD PRESSURE: 66 MMHG

## 2018-02-04 LAB
BASOPHILS # BLD AUTO: 0.2 /CMM (ref 0–0.2)
BASOPHILS NFR BLD AUTO: 0.9 % (ref 0–2)
BUN SERPL-MCNC: 37 MG/DL (ref 7–18)
CALCIUM SERPL-MCNC: 8.6 MG/DL (ref 8.5–10.1)
CHLORIDE SERPL-SCNC: 106 MMOL/L (ref 98–107)
CO2 SERPL-SCNC: 34 MMOL/L (ref 21–32)
CREAT SERPL-MCNC: 0.7 MG/DL (ref 0.6–1.3)
EOSINOPHIL # BLD AUTO: 0 /CMM (ref 0–0.7)
EOSINOPHIL NFR BLD AUTO: 0 % (ref 0–6)
GLUCOSE SERPL-MCNC: 128 MG/DL (ref 74–106)
HCT VFR BLD AUTO: 38 % (ref 33–45)
HGB BLD-MCNC: 12.1 G/DL (ref 11.5–14.8)
LYMPHOCYTES NFR BLD AUTO: 0.9 /CMM (ref 0.8–4.8)
LYMPHOCYTES NFR BLD AUTO: 3.8 % (ref 20–44)
MAGNESIUM SERPL-MCNC: 1.9 MG/DL (ref 1.8–2.4)
MCH RBC QN AUTO: 28 PG (ref 26–33)
MCHC RBC AUTO-ENTMCNC: 32 G/DL (ref 31–36)
MCV RBC AUTO: 87 FL (ref 82–100)
MONOCYTES NFR BLD AUTO: 0.8 /CMM (ref 0.1–1.3)
MONOCYTES NFR BLD AUTO: 3.4 % (ref 2–12)
NEUTROPHILS # BLD AUTO: 20.9 /CMM (ref 1.8–8.9)
NEUTROPHILS NFR BLD AUTO: 91.9 % (ref 43–81)
PHOSPHATE SERPL-MCNC: 2.8 MG/DL (ref 2.5–4.9)
PLATELET # BLD AUTO: 292 /CMM (ref 150–450)
POTASSIUM SERPL-SCNC: 4.6 MMOL/L (ref 3.5–5.1)
RBC # BLD AUTO: 4.3 MIL/UL (ref 4–5.2)
RDW COEFFICIENT OF VARIATION: 16.8 (ref 11.5–15)
SODIUM SERPL-SCNC: 143 MMOL/L (ref 136–145)
WBC NRBC COR # BLD AUTO: 22.7 K/UL (ref 4.3–11)

## 2018-02-04 RX ADMIN — Medication SCH MG: at 06:56

## 2018-02-04 RX ADMIN — PANTOPRAZOLE SODIUM SCH MG: 40 GRANULE, DELAYED RELEASE ORAL at 10:24

## 2018-02-04 RX ADMIN — METOPROLOL TARTRATE SCH MG: 25 TABLET, FILM COATED ORAL at 20:54

## 2018-02-04 RX ADMIN — Medication PRN ML: at 05:52

## 2018-02-04 RX ADMIN — FOLIC ACID SCH MG: 1 TABLET ORAL at 10:24

## 2018-02-04 RX ADMIN — Medication SCH MG: at 20:17

## 2018-02-04 RX ADMIN — DOCUSATE SODIUM SCH MG: 50 LIQUID ORAL at 10:24

## 2018-02-04 RX ADMIN — ATORVASTATIN CALCIUM SCH MG: 10 TABLET, FILM COATED ORAL at 22:46

## 2018-02-04 RX ADMIN — ASPIRIN 81 MG SCH MG: 81 TABLET ORAL at 10:24

## 2018-02-04 RX ADMIN — Medication PRN OZ: at 18:18

## 2018-02-04 RX ADMIN — KETOROLAC TROMETHAMINE PRN MG: 30 INJECTION, SOLUTION INTRAMUSCULAR at 17:13

## 2018-02-04 RX ADMIN — Medication SCH MG: at 11:59

## 2018-02-04 RX ADMIN — Medication SCH MG: at 03:42

## 2018-02-04 RX ADMIN — ACETAMINOPHEN PRN MG: 325 TABLET ORAL at 06:42

## 2018-02-04 RX ADMIN — DILTIAZEM HYDROCHLORIDE SCH MG: 30 TABLET, FILM COATED ORAL at 17:09

## 2018-02-04 RX ADMIN — FERROUS SULFATE TAB 325 MG (65 MG ELEMENTAL FE) SCH MG: 325 (65 FE) TAB at 10:24

## 2018-02-04 RX ADMIN — LEVOFLOXACIN SCH MG: 500 TABLET, FILM COATED ORAL at 23:38

## 2018-02-04 RX ADMIN — LORAZEPAM PRN MG: 2 INJECTION INTRAMUSCULAR; INTRAVENOUS at 18:07

## 2018-02-04 RX ADMIN — DILTIAZEM HYDROCHLORIDE SCH MG: 30 TABLET, FILM COATED ORAL at 05:52

## 2018-02-04 RX ADMIN — Medication PRN MG: at 13:27

## 2018-02-04 RX ADMIN — DILTIAZEM HYDROCHLORIDE SCH MG: 30 TABLET, FILM COATED ORAL at 00:00

## 2018-02-04 RX ADMIN — RIVAROXABAN SCH MG: 10 TABLET, FILM COATED ORAL at 17:11

## 2018-02-04 RX ADMIN — METOPROLOL TARTRATE SCH MG: 25 TABLET, FILM COATED ORAL at 09:00

## 2018-02-04 RX ADMIN — ACETAMINOPHEN PRN MG: 325 TABLET ORAL at 10:25

## 2018-02-04 RX ADMIN — Medication SCH MG: at 10:24

## 2018-02-04 RX ADMIN — Medication PRN MG: at 03:22

## 2018-02-04 RX ADMIN — Medication SCH MG: at 15:01

## 2018-02-04 RX ADMIN — DILTIAZEM HYDROCHLORIDE SCH MG: 30 TABLET, FILM COATED ORAL at 13:26

## 2018-02-04 NOTE — NUR
TELE1/RYLEE ZHOU GT WAS NOT GIVEN AS PER CHARGE RYLEE ASHRAF AS PATIENT'S HR GOES TO 55. WILL  
CONTINUE TO MONITOR

## 2018-02-04 NOTE — NUR
RN NOTES

 ADMINISTERED TORADOL 15 MG/ML IV PUSH GENERALIZED PAIN 7/10 PER PATIENT REQUEST, V/S TAKEN, 
BP- 103/59, P-83, ALSO ADMINISTERED SCHEDULED MEDICATION VIA G-TUBE,  ASSIST TURN AND 
REPOSITION Q 2 HR,  CALL LIGHT WITHIN TO REACH, CONTINUED MONITORING.

## 2018-02-04 NOTE — NUR
RN NOTES

RECEIVED PATIENT IN THE BED ON VENT, BUT PATIENT ABLE TO MAKE NEEDS  BY WRITING  DOWN  IN 
THE PAPER. SR-56, V/S TAKEN BP-94/49, R-16, PATIENT HAS NO RESPIRATORY DISTRESS, BEDBOUND, 
G-TUBE PLACEMENT AND RESIDUAL CHECKED ,NUTREN 45 ML/HR PATIENT TOLERATING FEEDING WELL, 
PATIENT INCONTINENT , USING DIAPER, NEEDS ATTENDED AND ANTICIPATED, ASSIST TURN AND 
REPOSITION Q 2 HR, CALL  LIGHT WITHIN TO REACH, SAFETY PRECAUTION MAINTAINED ALL THE TIME.

## 2018-02-04 NOTE — NUR
RN NOTES

 ADMINISTERED ATIVAN 1 MG/ML IV PUSH FOR ANXIETY, PER PATIENT REQUEST, V/S TAKEN BP -122/63, 
P-88,  CONTINUED MONITORING.

## 2018-02-04 NOTE — NUR
RN NOTES

 ADMINISTERED MORPHINE  2 MG/ML IV PUSH GENERALIZED PAIN 9/10 PER PATIENT REQUEST, V/S TAKEN 
/57, P-61, FAMILY NEXT TO THE PATIENT, CALL LIGHT WITHIN TO REACH, CONTINUED 
MONITORING.

## 2018-02-04 NOTE — NUR
RN NOTES

 ADMINISTERED TYLENOL 650 MG VIA G-TUBE  PRN FOR GENERALIZED PAIN 5/10, V/S TAKEN BP-94/49, 
P-56, CALL LIGHT WITHIN TO REACH, CONTINUED MONITORING.

## 2018-02-04 NOTE — NUR
PT ON VENT. VENT PLUGGED INTO RED OUTLET, VENT ALARM TESTED FOUND AUDIBLE WITHIN LIMITS. 
BREATHING TX GIVEN PER MD'S ORDERED. NO ADVERSE REACTION AT THIS TIME. SUCTIONED PRN. NO 
RESPIRATORY DISTRESS NOTED AT THIS TIME. WILL CONTINUE TO MONITOR THE PT.

## 2018-02-04 NOTE — NUR
RN NOTES

MEDICATION WERE ADMINISTERED FOR PAIN EFFECTIVE,  NO ACUTE RESPIRATORY DISTRESS AT THIS 
TIME, SCHEDULED MEDICATION ADMINISTERED, V/S STABLE, CALL LIGHT WITHIN TO REACH, FAMILY NEXT 
TO THE BED. CONTINUED MONITORING.

## 2018-02-04 NOTE — NUR
rn note

received patient in the bed, asleep, easily awakened, no respiratory distress noted, so2 
100%, g-tube feeding tolerates well at 45ml/hr nutren, left wrist 22 gauge notes, flushes 
well, no s/s of infiltration noted, bed in the low position, call light within reach, side 
rails up x 2, all belongings within reach, will continue to monitor patient

-------------------------------------------------------------------------------

Addendum: 02/04/18 at 2233 by KAREN JOHN RN

-------------------------------------------------------------------------------

note for 1930

## 2018-02-04 NOTE — NUR
TELE1/RN

C/O PAIN IN TRACH SITE, TYLENOL 650 MG PO WAS GIVEN. MORPHINE IS NOT DUE YET AT THIS TIME. 
RT MADE AWARE OF THE PAIN.

## 2018-02-04 NOTE — NUR
RN NOTES

 PATIENT IN THE BED, MEDICATION WERE ADMINISTERED FOR ANXIETY EFFECTIVE, NO ACUTE 
RESPIRATORY DISTRESS, V/S STABLE, IV ACCESS ON LEFT WRIST INTACT, G-TUBE FEEDING IN 45 ML/HR 
INTACT, NEEDS ATTENDED AND ANTICIPATED, ASSIST TURN AND REPOSITION Q 2 HR, COVERED WITH WARM 
BLANKET , PATIENT INCONTINENT APPLIED  Z-GUARD. FAMILY NEXT TO THE BED. ENDORSED ONCOMING 
NURSE FOR PHONG.

## 2018-02-05 VITALS — DIASTOLIC BLOOD PRESSURE: 66 MMHG | SYSTOLIC BLOOD PRESSURE: 112 MMHG

## 2018-02-05 VITALS — DIASTOLIC BLOOD PRESSURE: 67 MMHG | SYSTOLIC BLOOD PRESSURE: 112 MMHG

## 2018-02-05 VITALS — SYSTOLIC BLOOD PRESSURE: 103 MMHG | DIASTOLIC BLOOD PRESSURE: 62 MMHG

## 2018-02-05 VITALS — DIASTOLIC BLOOD PRESSURE: 59 MMHG | SYSTOLIC BLOOD PRESSURE: 110 MMHG

## 2018-02-05 VITALS — SYSTOLIC BLOOD PRESSURE: 109 MMHG | DIASTOLIC BLOOD PRESSURE: 58 MMHG

## 2018-02-05 VITALS — SYSTOLIC BLOOD PRESSURE: 112 MMHG | DIASTOLIC BLOOD PRESSURE: 67 MMHG

## 2018-02-05 VITALS — DIASTOLIC BLOOD PRESSURE: 61 MMHG | SYSTOLIC BLOOD PRESSURE: 11 MMHG

## 2018-02-05 LAB
BASOPHILS # BLD AUTO: 0 /CMM (ref 0–0.2)
BASOPHILS NFR BLD AUTO: 0.1 % (ref 0–2)
BUN SERPL-MCNC: 37 MG/DL (ref 7–18)
CALCIUM SERPL-MCNC: 8.4 MG/DL (ref 8.5–10.1)
CHLORIDE SERPL-SCNC: 102 MMOL/L (ref 98–107)
CO2 SERPL-SCNC: 32 MMOL/L (ref 21–32)
CREAT SERPL-MCNC: 0.5 MG/DL (ref 0.6–1.3)
EOSINOPHIL # BLD AUTO: 0 /CMM (ref 0–0.7)
EOSINOPHIL NFR BLD AUTO: 0 % (ref 0–6)
GLUCOSE SERPL-MCNC: 103 MG/DL (ref 74–106)
HCT VFR BLD AUTO: 38 % (ref 33–45)
HGB BLD-MCNC: 12.4 G/DL (ref 11.5–14.8)
LYMPHOCYTES NFR BLD AUTO: 0.9 /CMM (ref 0.8–4.8)
LYMPHOCYTES NFR BLD AUTO: 4.1 % (ref 20–44)
LYMPHOCYTES NFR BLD MANUAL: 7 % (ref 16–48)
MCH RBC QN AUTO: 28 PG (ref 26–33)
MCHC RBC AUTO-ENTMCNC: 33 G/DL (ref 31–36)
MCV RBC AUTO: 87 FL (ref 82–100)
MONOCYTES NFR BLD AUTO: 1.2 /CMM (ref 0.1–1.3)
MONOCYTES NFR BLD AUTO: 5.3 % (ref 2–12)
MONOCYTES NFR BLD MANUAL: 8 % (ref 0–11)
NEUTROPHILS # BLD AUTO: 21.1 /CMM (ref 1.8–8.9)
NEUTROPHILS NFR BLD AUTO: 90.5 % (ref 43–81)
NEUTS SEG NFR BLD MANUAL: 85 % (ref 42–76)
PLATELET # BLD AUTO: 287 /CMM (ref 150–450)
POTASSIUM SERPL-SCNC: 4.9 MMOL/L (ref 3.5–5.1)
RBC # BLD AUTO: 4.37 MIL/UL (ref 4–5.2)
RDW COEFFICIENT OF VARIATION: 16.9 (ref 11.5–15)
SODIUM SERPL-SCNC: 140 MMOL/L (ref 136–145)
WBC NRBC COR # BLD AUTO: 23.4 K/UL (ref 4.3–11)

## 2018-02-05 RX ADMIN — Medication SCH MG: at 23:30

## 2018-02-05 RX ADMIN — METOPROLOL TARTRATE SCH MG: 25 TABLET, FILM COATED ORAL at 21:00

## 2018-02-05 RX ADMIN — LORAZEPAM PRN MG: 2 INJECTION INTRAMUSCULAR; INTRAVENOUS at 17:25

## 2018-02-05 RX ADMIN — Medication SCH MG: at 11:21

## 2018-02-05 RX ADMIN — Medication PRN MG: at 03:15

## 2018-02-05 RX ADMIN — DILTIAZEM HYDROCHLORIDE SCH MG: 30 TABLET, FILM COATED ORAL at 17:24

## 2018-02-05 RX ADMIN — ATORVASTATIN CALCIUM SCH MG: 10 TABLET, FILM COATED ORAL at 21:27

## 2018-02-05 RX ADMIN — DILTIAZEM HYDROCHLORIDE SCH MG: 30 TABLET, FILM COATED ORAL at 01:09

## 2018-02-05 RX ADMIN — Medication PRN MG: at 08:28

## 2018-02-05 RX ADMIN — LEVOFLOXACIN SCH MG: 500 TABLET, FILM COATED ORAL at 23:23

## 2018-02-05 RX ADMIN — DILTIAZEM HYDROCHLORIDE SCH MG: 30 TABLET, FILM COATED ORAL at 12:01

## 2018-02-05 RX ADMIN — Medication SCH MG: at 00:25

## 2018-02-05 RX ADMIN — ASPIRIN 81 MG SCH MG: 81 TABLET ORAL at 08:29

## 2018-02-05 RX ADMIN — DILTIAZEM HYDROCHLORIDE SCH MG: 30 TABLET, FILM COATED ORAL at 05:17

## 2018-02-05 RX ADMIN — FERROUS SULFATE TAB 325 MG (65 MG ELEMENTAL FE) SCH MG: 325 (65 FE) TAB at 08:28

## 2018-02-05 RX ADMIN — Medication SCH MG: at 07:58

## 2018-02-05 RX ADMIN — Medication PRN ML: at 05:18

## 2018-02-05 RX ADMIN — Medication SCH MG: at 15:54

## 2018-02-05 RX ADMIN — PANTOPRAZOLE SODIUM SCH MG: 40 GRANULE, DELAYED RELEASE ORAL at 08:29

## 2018-02-05 RX ADMIN — Medication PRN MG: at 13:10

## 2018-02-05 RX ADMIN — FOLIC ACID SCH MG: 1 TABLET ORAL at 08:29

## 2018-02-05 RX ADMIN — DOCUSATE SODIUM SCH MG: 50 LIQUID ORAL at 08:28

## 2018-02-05 RX ADMIN — Medication SCH APPLIC: at 17:23

## 2018-02-05 RX ADMIN — Medication SCH MG: at 19:57

## 2018-02-05 RX ADMIN — METOPROLOL TARTRATE SCH MG: 25 TABLET, FILM COATED ORAL at 08:29

## 2018-02-05 RX ADMIN — RIVAROXABAN SCH MG: 10 TABLET, FILM COATED ORAL at 17:25

## 2018-02-05 RX ADMIN — Medication SCH MG: at 08:29

## 2018-02-05 RX ADMIN — Medication SCH MG: at 03:21

## 2018-02-05 RX ADMIN — Medication SCH MG: at 08:28

## 2018-02-05 NOTE — NUR
tele rn closing notes



All needs provided, attended, and anticipated, kept patient clean and comfortable in bed, 
call light with in patient reach, endorsed to next shift RN to continue care.

## 2018-02-05 NOTE — NUR
tele rn initial notes



Received patient in bed, awake, head of bed, elevated, no SOB or distress noted, on 
mechanical vent with 02 sat of 100%.  on tele monitor SB heart rate of 55. No complaint of 
pain or discomfort noted.  patient is able to mouth words, for alaniz insertion due to 
patient is retaining urine, ordered obtained by night RN.  On GT feeding and tolerated well, 
with no residual noted.  per night nurse noted partial thickness loss on the sacral area and 
informed responsible party and made aware.  IV intact and patent. Kept patient clean and 
comfortable in bed, call light with in patient reach, will continue to monitor accordingly.

## 2018-02-05 NOTE — NUR
WOUND CARE CONSULT: PT SEEN FOR RT LOWER BUTTOCK SKIN TEAR. PT ABLE TO ASSIST WITH TURNING 
AND REPOSITIONING IN BED. PT ON COMFORT GEL MATTRESS. CURRENT ANTONIETTA SCORE IS 14. 
RECOMMENDATIONS MADE FOR SKIN PROTECTION AND WOUND CARE. DISCUSSED WITH NURSING STAFF. WILL 
SEE PRN. MD IN AGREEMENT WITH PLAN OF CARE. 

-------------------------------------------------------------------------------

Addendum: 02/05/18 at 1334 by MELITON SALGADO WNDNU

-------------------------------------------------------------------------------

Amended: Links added.

## 2018-02-05 NOTE — NUR
RN NOTE

1 DIAPER CHANGE DURING MY SHIFT, BLADDER SCAN SHOWS 636ML, CALLED DR JEWELL, ORDERED FARFAN 
CATHETER, PLACED AN ORDER AND ENDORSED TO AM NURSE HYACINTH, SHIFT FOR FARFAN CATH INSERTION, 
NOTIFIED ABOUT WBC 22.7, MD WAS ALREADY AWARE, NO NEW ORDERS GIVEN AT THIS MOMENT, NO 
RESPIRATORY DISTRESS NOTED, PATIENT IS ALERT/ORIENTED, PAIN IS WELL CONTROLLED WITH PAIN 
MANAGEMENT, ALL SAFETY MEASURES TAKEN, BED IN THE LOW POSITION, CALL LIGHT WITHIN REACH, 
ENDORSED TO AM NURSE

## 2018-02-05 NOTE — NUR
rn note

pain 10/10, requested pain med, neck pain, uncomfortable, bp 126/70, pain med was 
administered

## 2018-02-05 NOTE — NUR
RN TELE NOTES,



RECEIVED PATIENT IN BED, IN VENT SETTINGS, NO S/S OF SOB OR ACUTE DISTRESS NOTES AT THIS 
TIME, GTF RUNNING WELL AND PATIENT TOLERATED WELL, LEFT WRIST IV SITE INTACT AND PATENT, 
NOTED DRY AND CLEAN AT  THIS TIME, FAMILY AT BEDSIDE,  BED LOCKED AND IN LOWEST POSITION, 
CALL LIGHT W/I REACH, WILL CONTINUE TO MONITOR CLOSELY.

## 2018-02-06 VITALS — DIASTOLIC BLOOD PRESSURE: 65 MMHG | SYSTOLIC BLOOD PRESSURE: 116 MMHG

## 2018-02-06 VITALS — SYSTOLIC BLOOD PRESSURE: 122 MMHG | DIASTOLIC BLOOD PRESSURE: 68 MMHG

## 2018-02-06 VITALS — SYSTOLIC BLOOD PRESSURE: 110 MMHG | DIASTOLIC BLOOD PRESSURE: 68 MMHG

## 2018-02-06 VITALS — DIASTOLIC BLOOD PRESSURE: 63 MMHG | SYSTOLIC BLOOD PRESSURE: 115 MMHG

## 2018-02-06 VITALS — DIASTOLIC BLOOD PRESSURE: 54 MMHG | SYSTOLIC BLOOD PRESSURE: 108 MMHG

## 2018-02-06 RX ADMIN — Medication SCH MG: at 00:34

## 2018-02-06 RX ADMIN — KETOROLAC TROMETHAMINE PRN MG: 30 INJECTION, SOLUTION INTRAMUSCULAR at 06:06

## 2018-02-06 RX ADMIN — METOPROLOL TARTRATE SCH MG: 25 TABLET, FILM COATED ORAL at 08:33

## 2018-02-06 RX ADMIN — Medication SCH MG: at 19:44

## 2018-02-06 RX ADMIN — Medication PRN MG: at 23:16

## 2018-02-06 RX ADMIN — FERROUS SULFATE TAB 325 MG (65 MG ELEMENTAL FE) SCH MG: 325 (65 FE) TAB at 08:32

## 2018-02-06 RX ADMIN — LORAZEPAM PRN MG: 2 INJECTION INTRAMUSCULAR; INTRAVENOUS at 13:27

## 2018-02-06 RX ADMIN — LEVOFLOXACIN SCH MG: 500 TABLET, FILM COATED ORAL at 23:12

## 2018-02-06 RX ADMIN — Medication PRN MG: at 19:34

## 2018-02-06 RX ADMIN — Medication SCH APPLIC: at 08:34

## 2018-02-06 RX ADMIN — Medication SCH MG: at 08:33

## 2018-02-06 RX ADMIN — DILTIAZEM HYDROCHLORIDE SCH MG: 30 TABLET, FILM COATED ORAL at 00:10

## 2018-02-06 RX ADMIN — Medication SCH MG: at 23:36

## 2018-02-06 RX ADMIN — Medication SCH MG: at 03:26

## 2018-02-06 RX ADMIN — DILTIAZEM HYDROCHLORIDE SCH MG: 30 TABLET, FILM COATED ORAL at 12:40

## 2018-02-06 RX ADMIN — ASPIRIN 81 MG SCH MG: 81 TABLET ORAL at 08:33

## 2018-02-06 RX ADMIN — ATORVASTATIN CALCIUM SCH MG: 10 TABLET, FILM COATED ORAL at 21:35

## 2018-02-06 RX ADMIN — PANTOPRAZOLE SODIUM SCH MG: 40 GRANULE, DELAYED RELEASE ORAL at 08:33

## 2018-02-06 RX ADMIN — DILTIAZEM HYDROCHLORIDE SCH MG: 30 TABLET, FILM COATED ORAL at 17:23

## 2018-02-06 RX ADMIN — RIVAROXABAN SCH MG: 10 TABLET, FILM COATED ORAL at 17:23

## 2018-02-06 RX ADMIN — Medication SCH MG: at 11:22

## 2018-02-06 RX ADMIN — METOPROLOL TARTRATE SCH MG: 25 TABLET, FILM COATED ORAL at 21:39

## 2018-02-06 RX ADMIN — Medication SCH MG: at 15:20

## 2018-02-06 RX ADMIN — Medication SCH MG: at 08:32

## 2018-02-06 RX ADMIN — ACETAMINOPHEN PRN MG: 325 TABLET ORAL at 08:31

## 2018-02-06 RX ADMIN — FOLIC ACID SCH MG: 1 TABLET ORAL at 08:32

## 2018-02-06 RX ADMIN — DILTIAZEM HYDROCHLORIDE SCH MG: 30 TABLET, FILM COATED ORAL at 06:11

## 2018-02-06 RX ADMIN — Medication PRN ML: at 05:37

## 2018-02-06 RX ADMIN — Medication SCH MG: at 07:14

## 2018-02-06 RX ADMIN — DOCUSATE SODIUM SCH MG: 50 LIQUID ORAL at 08:32

## 2018-02-06 NOTE — NUR
RN NOTES

PT REMAINED IN STABLE CONDITION THROUGHOUT THE SHIFT, NO SIGNIFICANT CHANGES NOTED, ALL 
NEEDS MET,  AT BEDSIDE.  WILL ENDORSE TO ONCOMING SHIFT.

## 2018-02-06 NOTE — NUR
RN NOTES

RECEIVED PT FROM NIGHT SHIFT, A&0X2-3 ABLE TO MOUTH OUT WORDS AND MAKE NEEDS KNOWN. CHRONIC 
VENT/TRACH DEPENDENT TOLERATING VENT SETTINGS NO SOB OR DISTRESS NOTED. AFLUTTER ON THE TELE 
[place] HR 86. L WRIST 22G IV SITE INTACT NO IVF. BED LOCKED AND IN LOWEST POSITION, CALL LIGHT 
WITHIN REACH, WILL CONT TO [place].

## 2018-02-06 NOTE — NUR
RCVD PT ON VENT WITH NOTED SETTINGS. PT ALERT AND AWAKE, VENT PLUGGED INTO RED OUTLET, VENT 
ALARM WORKING AND AUDIBLE. BREATHING TX GIVEN PER MD'S ORDERED. NO ADVERSE REACTION AT THIS 
TIME. SUCTIONED MODERATE AMOUNT OF PALE YELLOW THICK SECRETIONS. BILATERAL BS NOTED, NO 
RESPIRATORY DISTRESS NOTED AT THIS TIME. WILL CONTINUE TO MONITOR THE PT.

## 2018-02-06 NOTE — NUR
RN TELE CLOSING NOTES,



PATIENT IN BED AWAKE, ALERT AND ORIENTED,  PAIN MEDICATION PROVIDED EARLIER, ON VENT 
SETTINGS, NO S/S OF SOB OR ACUTE DISTRESS NOTED, SATURATION @100%, KEPT DRY AND CLEAN, 
AFEBRILE AT THIS TIME, GTF INFUSING WELL AND PATIENT TOLERATED WELL, IV SITE IN LEFT WRIST, 
INTACT AND PATENT, BED IN LOWEST POSITION, REPOSITION PROVIDED Q2HRS AND PRN FOR COMFORT, 
CALL LIGHT W/I REACH, WILL ENDORSE COBNTINUITY OF CARE TO NEXT NURSE.

## 2018-02-07 VITALS — DIASTOLIC BLOOD PRESSURE: 59 MMHG | SYSTOLIC BLOOD PRESSURE: 100 MMHG

## 2018-02-07 VITALS — SYSTOLIC BLOOD PRESSURE: 101 MMHG | DIASTOLIC BLOOD PRESSURE: 67 MMHG

## 2018-02-07 VITALS — SYSTOLIC BLOOD PRESSURE: 113 MMHG | DIASTOLIC BLOOD PRESSURE: 75 MMHG

## 2018-02-07 VITALS — DIASTOLIC BLOOD PRESSURE: 61 MMHG | SYSTOLIC BLOOD PRESSURE: 102 MMHG

## 2018-02-07 VITALS — DIASTOLIC BLOOD PRESSURE: 80 MMHG | SYSTOLIC BLOOD PRESSURE: 134 MMHG

## 2018-02-07 LAB
ALBUMIN SERPL BCP-MCNC: 3 G/DL (ref 3.4–5)
ALP SERPL-CCNC: 157 U/L (ref 46–116)
ALT SERPL W P-5'-P-CCNC: 105 U/L (ref 12–78)
AST SERPL W P-5'-P-CCNC: 44 U/L (ref 15–37)
BASOPHILS # BLD AUTO: 0 /CMM (ref 0–0.2)
BASOPHILS NFR BLD AUTO: 0 % (ref 0–2)
BILIRUB SERPL-MCNC: 0.8 MG/DL (ref 0.2–1)
BUN SERPL-MCNC: 48 MG/DL (ref 7–18)
CALCIUM SERPL-MCNC: 9.2 MG/DL (ref 8.5–10.1)
CHLORIDE SERPL-SCNC: 103 MMOL/L (ref 98–107)
CO2 SERPL-SCNC: 33 MMOL/L (ref 21–32)
CREAT SERPL-MCNC: 0.8 MG/DL (ref 0.6–1.3)
EOSINOPHIL # BLD AUTO: 0 /CMM (ref 0–0.7)
EOSINOPHIL NFR BLD AUTO: 0 % (ref 0–6)
GLUCOSE SERPL-MCNC: 141 MG/DL (ref 74–106)
HCT VFR BLD AUTO: 45 % (ref 33–45)
HGB BLD-MCNC: 14.3 G/DL (ref 11.5–14.8)
LYMPHOCYTES NFR BLD AUTO: 1.2 /CMM (ref 0.8–4.8)
LYMPHOCYTES NFR BLD AUTO: 5.1 % (ref 20–44)
LYMPHOCYTES NFR BLD MANUAL: 1 % (ref 16–48)
MCH RBC QN AUTO: 28 PG (ref 26–33)
MCHC RBC AUTO-ENTMCNC: 32 G/DL (ref 31–36)
MCV RBC AUTO: 87 FL (ref 82–100)
MONOCYTES NFR BLD AUTO: 0.5 /CMM (ref 0.1–1.3)
MONOCYTES NFR BLD AUTO: 2.2 % (ref 2–12)
MONOCYTES NFR BLD MANUAL: 1 % (ref 0–11)
NEUTROPHILS # BLD AUTO: 21.5 /CMM (ref 1.8–8.9)
NEUTROPHILS NFR BLD AUTO: 92.7 % (ref 43–81)
NEUTS SEG NFR BLD MANUAL: 98 % (ref 42–76)
PLATELET # BLD AUTO: 341 /CMM (ref 150–450)
POTASSIUM SERPL-SCNC: 5 MMOL/L (ref 3.5–5.1)
PROT SERPL-MCNC: 6.6 G/DL (ref 6.4–8.2)
RBC # BLD AUTO: 5.09 MIL/UL (ref 4–5.2)
RDW COEFFICIENT OF VARIATION: 17.9 (ref 11.5–15)
SODIUM SERPL-SCNC: 141 MMOL/L (ref 136–145)
WBC NRBC COR # BLD AUTO: 23.2 K/UL (ref 4.3–11)

## 2018-02-07 RX ADMIN — KETOROLAC TROMETHAMINE PRN MG: 30 INJECTION, SOLUTION INTRAMUSCULAR at 02:31

## 2018-02-07 RX ADMIN — Medication SCH APPLIC: at 10:03

## 2018-02-07 RX ADMIN — Medication SCH MG: at 08:16

## 2018-02-07 RX ADMIN — Medication SCH MG: at 10:02

## 2018-02-07 RX ADMIN — DOCUSATE SODIUM SCH MG: 50 LIQUID ORAL at 10:00

## 2018-02-07 RX ADMIN — Medication SCH MG: at 12:28

## 2018-02-07 RX ADMIN — Medication SCH MG: at 09:00

## 2018-02-07 RX ADMIN — DILTIAZEM HYDROCHLORIDE SCH MG: 30 TABLET, FILM COATED ORAL at 00:00

## 2018-02-07 RX ADMIN — DILTIAZEM HYDROCHLORIDE SCH MG: 30 TABLET, FILM COATED ORAL at 06:00

## 2018-02-07 RX ADMIN — ASPIRIN 81 MG SCH MG: 81 TABLET ORAL at 10:01

## 2018-02-07 RX ADMIN — Medication SCH MG: at 15:15

## 2018-02-07 RX ADMIN — LORAZEPAM PRN MG: 2 INJECTION INTRAMUSCULAR; INTRAVENOUS at 17:50

## 2018-02-07 RX ADMIN — FERROUS SULFATE TAB 325 MG (65 MG ELEMENTAL FE) SCH MG: 325 (65 FE) TAB at 10:01

## 2018-02-07 RX ADMIN — Medication PRN MG: at 14:34

## 2018-02-07 RX ADMIN — METOPROLOL TARTRATE SCH MG: 25 TABLET, FILM COATED ORAL at 10:02

## 2018-02-07 RX ADMIN — KETOROLAC TROMETHAMINE PRN MG: 30 INJECTION, SOLUTION INTRAMUSCULAR at 10:40

## 2018-02-07 RX ADMIN — Medication SCH MG: at 03:30

## 2018-02-07 RX ADMIN — FOLIC ACID SCH MG: 1 TABLET ORAL at 10:02

## 2018-02-07 RX ADMIN — DILTIAZEM HYDROCHLORIDE SCH MG: 30 TABLET, FILM COATED ORAL at 12:36

## 2018-02-07 RX ADMIN — Medication PRN ML: at 05:35

## 2018-02-07 RX ADMIN — RIVAROXABAN SCH MG: 10 TABLET, FILM COATED ORAL at 17:48

## 2018-02-07 RX ADMIN — Medication PRN MG: at 06:44

## 2018-02-07 RX ADMIN — PANTOPRAZOLE SODIUM SCH MG: 40 GRANULE, DELAYED RELEASE ORAL at 10:01

## 2018-02-07 NOTE — NUR
RN DISCHARGE



REPORT WAS GIVEN TO NURSE THAT PT. HAD A SWALLOW EVALUATION AND THAT SHE CAN HAVE ICE CHIPS 
AS TOLERATED FOR ORAL GRATIFICATION. REPORT ALSO WAS GIVEN THAT PT. HAD A LAST BOWEL 
MOVEMENT 02/03, 4 DAYS AGO, AND THAT MILK OF MAGNESIUM WAS GIVEN TO PT. TODAY.

## 2018-02-07 NOTE — NUR
RN DISCHARGE NOTES



PT. LEFT IN MEDICALLY STABLE CONDITION BY AMBULANCE TO [facility]. PT.'S 
, AND GRANDDAUGHTER WAS ALONG SIDE PT. DISCHARGE INSTRUCTIONS, WITH EDUCATION WAS 
GIVEN, AND PT. VERBALIZED UNDERSTANDING. BELONGINGS LIST CHECKED, AND SIGNED. ID BAND, AND 
IV WAS REMOVED WITHOUT COMPLICATIONS. PT. LEFT WITH A TRACHEOSTOMY INTACT, PEG TUBE ON LEFT 
UPPER ABDOMINAL QUADRANT INTACT AND PATENT, AND A FARFAN CATHETER, REMOVED 200 CC OF CLEAR, 
AND YELLOW URINE. DISCHARGE REPORT GIVEN TO RN WITH EMT.

## 2018-02-07 NOTE — NUR
RN NOTES



REPORT WAS GIVEN VIA PHONE TO BUCKY MCKEE AT [facility]. ALL QUESTIONS ANSWERED.

## 2018-02-07 NOTE — NUR
RN TELE NOTES,



PATIENT AWAKE, ALERT AND ORIENTED, C/O PAIN/ MEDICATION FOR PAIN ADMINISTERED, ON VENT 
SETTINGS, NO SOB OR ACUTE DISTRESS NOTED AT THIS TIME, LEFT PIV LINE INTACT AND PATENT, GT 
IN PLACE FEEDING INFUSING WELL AND PATIENT TOLERATED WELL, HOB ELEVATED AT ALL TIMES, F/C 
DRAINING DARK YELLOW URINE BY GRAVITY, BED LOCKED AND IN LOWEST POSITION, CALL LIGHT W/I 
REACH, WILL ENDORSE CONTINUITY OF CARE TO NEXT SHIFT NURSE.

## 2018-02-07 NOTE — NUR
RN OPENING/TELE NOTES



RECEIVED PT. IN BED A&OX3. PT. HAS A TRACHEOSTOMY, PORTEX 8, TRACH SETTINGS AC 16, TOTAL 
VOLUME 400, AND FIO2 50%. BREATHING UNLABORED, AND EVENLY ON OXYGEN. NO S/S OF ACUTE 
DISTRESS. IV ACCESS ON LEFT WRIST SALINE LOCKED. PT. HAS G TUBE FEEDING RUNNING AT 45 ML/HR. 
BED IS IN LOWEST, AND LOCKED POSITION, 2 SIDE RAILS UP, AND INSTRUCTED PT. TO USE CALL LIGHT 
FOR ASSISTANCE. ALL NEEDS MET. WILL CONTINUE TO ASSESS AND MONITOR.

## 2018-02-09 ENCOUNTER — HOSPITAL ENCOUNTER (INPATIENT)
Dept: HOSPITAL 54 - ER | Age: 68
LOS: 6 days | Discharge: NURSING, REHAB OR LONG-TERM CARE | DRG: 870 | End: 2018-02-15
Attending: INTERNAL MEDICINE | Admitting: INTERNAL MEDICINE
Payer: COMMERCIAL

## 2018-02-09 VITALS — SYSTOLIC BLOOD PRESSURE: 104 MMHG | DIASTOLIC BLOOD PRESSURE: 65 MMHG

## 2018-02-09 VITALS — DIASTOLIC BLOOD PRESSURE: 64 MMHG | SYSTOLIC BLOOD PRESSURE: 90 MMHG

## 2018-02-09 VITALS — SYSTOLIC BLOOD PRESSURE: 110 MMHG | DIASTOLIC BLOOD PRESSURE: 58 MMHG

## 2018-02-09 VITALS — SYSTOLIC BLOOD PRESSURE: 97 MMHG | DIASTOLIC BLOOD PRESSURE: 62 MMHG

## 2018-02-09 VITALS — DIASTOLIC BLOOD PRESSURE: 76 MMHG | SYSTOLIC BLOOD PRESSURE: 110 MMHG

## 2018-02-09 VITALS — SYSTOLIC BLOOD PRESSURE: 109 MMHG | DIASTOLIC BLOOD PRESSURE: 62 MMHG

## 2018-02-09 VITALS — DIASTOLIC BLOOD PRESSURE: 55 MMHG | SYSTOLIC BLOOD PRESSURE: 97 MMHG

## 2018-02-09 VITALS — SYSTOLIC BLOOD PRESSURE: 97 MMHG | DIASTOLIC BLOOD PRESSURE: 71 MMHG

## 2018-02-09 VITALS — HEIGHT: 64 IN | WEIGHT: 106 LBS | BODY MASS INDEX: 18.1 KG/M2

## 2018-02-09 VITALS — SYSTOLIC BLOOD PRESSURE: 94 MMHG | DIASTOLIC BLOOD PRESSURE: 56 MMHG

## 2018-02-09 VITALS — DIASTOLIC BLOOD PRESSURE: 60 MMHG | SYSTOLIC BLOOD PRESSURE: 103 MMHG

## 2018-02-09 VITALS — SYSTOLIC BLOOD PRESSURE: 106 MMHG | DIASTOLIC BLOOD PRESSURE: 54 MMHG

## 2018-02-09 VITALS — DIASTOLIC BLOOD PRESSURE: 52 MMHG | SYSTOLIC BLOOD PRESSURE: 97 MMHG

## 2018-02-09 VITALS — SYSTOLIC BLOOD PRESSURE: 127 MMHG | DIASTOLIC BLOOD PRESSURE: 50 MMHG

## 2018-02-09 DIAGNOSIS — R74.0: ICD-10-CM

## 2018-02-09 DIAGNOSIS — E78.5: ICD-10-CM

## 2018-02-09 DIAGNOSIS — Z99.11: ICD-10-CM

## 2018-02-09 DIAGNOSIS — T38.0X5A: ICD-10-CM

## 2018-02-09 DIAGNOSIS — A41.9: Primary | ICD-10-CM

## 2018-02-09 DIAGNOSIS — F41.9: ICD-10-CM

## 2018-02-09 DIAGNOSIS — E83.42: ICD-10-CM

## 2018-02-09 DIAGNOSIS — E87.3: ICD-10-CM

## 2018-02-09 DIAGNOSIS — M41.9: ICD-10-CM

## 2018-02-09 DIAGNOSIS — I48.92: ICD-10-CM

## 2018-02-09 DIAGNOSIS — K21.9: ICD-10-CM

## 2018-02-09 DIAGNOSIS — I21.4: ICD-10-CM

## 2018-02-09 DIAGNOSIS — Z90.2: ICD-10-CM

## 2018-02-09 DIAGNOSIS — D68.59: ICD-10-CM

## 2018-02-09 DIAGNOSIS — J96.21: ICD-10-CM

## 2018-02-09 DIAGNOSIS — E88.09: ICD-10-CM

## 2018-02-09 DIAGNOSIS — D63.8: ICD-10-CM

## 2018-02-09 DIAGNOSIS — I11.0: ICD-10-CM

## 2018-02-09 DIAGNOSIS — E44.0: ICD-10-CM

## 2018-02-09 DIAGNOSIS — R13.10: ICD-10-CM

## 2018-02-09 DIAGNOSIS — I50.9: ICD-10-CM

## 2018-02-09 DIAGNOSIS — K72.00: ICD-10-CM

## 2018-02-09 DIAGNOSIS — L98.8: ICD-10-CM

## 2018-02-09 DIAGNOSIS — I48.91: ICD-10-CM

## 2018-02-09 DIAGNOSIS — R65.21: ICD-10-CM

## 2018-02-09 DIAGNOSIS — J44.9: ICD-10-CM

## 2018-02-09 DIAGNOSIS — E83.39: ICD-10-CM

## 2018-02-09 DIAGNOSIS — J96.22: ICD-10-CM

## 2018-02-09 DIAGNOSIS — Z93.0: ICD-10-CM

## 2018-02-09 DIAGNOSIS — Z79.01: ICD-10-CM

## 2018-02-09 DIAGNOSIS — L30.4: ICD-10-CM

## 2018-02-09 DIAGNOSIS — I27.20: ICD-10-CM

## 2018-02-09 DIAGNOSIS — Z22.322: ICD-10-CM
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
## 2018-02-09 LAB
ALBUMIN SERPL BCP-MCNC: 2.6 G/DL (ref 3.4–5)
ALP SERPL-CCNC: 159 U/L (ref 46–116)
ALT SERPL W P-5'-P-CCNC: 141 U/L (ref 12–78)
APTT PPP: 20 SEC (ref 23–34)
AST SERPL W P-5'-P-CCNC: 79 U/L (ref 15–37)
BASE EXCESS BLDA CALC-SCNC: 6.8 MMOL/L
BASOPHILS # BLD AUTO: 0 /CMM (ref 0–0.2)
BASOPHILS NFR BLD AUTO: 0.1 % (ref 0–2)
BILIRUB DIRECT SERPL-MCNC: 0.1 MG/DL (ref 0–0.2)
BILIRUB SERPL-MCNC: 0.6 MG/DL (ref 0.2–1)
BUN SERPL-MCNC: 41 MG/DL (ref 7–18)
CALCIUM SERPL-MCNC: 8.7 MG/DL (ref 8.5–10.1)
CHLORIDE SERPL-SCNC: 101 MMOL/L (ref 98–107)
CO2 SERPL-SCNC: 34 MMOL/L (ref 21–32)
CREAT SERPL-MCNC: 0.7 MG/DL (ref 0.6–1.3)
DO-HGB MFR BLDA: 61.3 MMHG
EOSINOPHIL # BLD AUTO: 0.2 /CMM (ref 0–0.7)
EOSINOPHIL NFR BLD AUTO: 0.6 % (ref 0–6)
GLUCOSE SERPL-MCNC: 135 MG/DL (ref 74–106)
HCT VFR BLD AUTO: 41 % (ref 33–45)
HGB BLD-MCNC: 13 G/DL (ref 11.5–14.8)
INHALED O2 CONCENTRATION: 40 %
INR PPP: 0.93 (ref 0.85–1.15)
INTRINSIC PEEP RESPIRATORY: 5 CM H2O
LYMPHOCYTES NFR BLD AUTO: 3.2 /CMM (ref 0.8–4.8)
LYMPHOCYTES NFR BLD AUTO: 9.4 % (ref 20–44)
LYMPHOCYTES NFR BLD MANUAL: 9 % (ref 16–48)
MCH RBC QN AUTO: 28 PG (ref 26–33)
MCHC RBC AUTO-ENTMCNC: 32 G/DL (ref 31–36)
MCV RBC AUTO: 88 FL (ref 82–100)
MONOCYTES NFR BLD AUTO: 0.7 /CMM (ref 0.1–1.3)
MONOCYTES NFR BLD AUTO: 1.9 % (ref 2–12)
MONOCYTES NFR BLD MANUAL: 1 % (ref 0–11)
NEUTROPHILS # BLD AUTO: 29.5 /CMM (ref 1.8–8.9)
NEUTROPHILS NFR BLD AUTO: 88 % (ref 43–81)
NEUTS SEG NFR BLD MANUAL: 90 % (ref 42–76)
NT-PROBNP SERPL-MCNC: 2759 PG/ML (ref 0–125)
PCO2 TEMP ADJ BLDA: 41.8 MMHG (ref 35–45)
PEEP SETTING VENT: 400 ML
PH TEMP ADJ BLDA: 7.49 [PH] (ref 7.35–7.45)
PLATELET # BLD AUTO: 307 /CMM (ref 150–450)
PO2 TEMP ADJ BLDA: 175.8 MMHG (ref 75–100)
POTASSIUM SERPL-SCNC: 5.5 MMOL/L (ref 3.5–5.1)
PROT SERPL-MCNC: 5.8 G/DL (ref 6.4–8.2)
RBC # BLD AUTO: 4.63 MIL/UL (ref 4–5.2)
RDW COEFFICIENT OF VARIATION: 19.1 (ref 11.5–15)
SAO2 % BLDA: 98.7 % (ref 92–98.5)
SET RATE, BG: 16
SODIUM SERPL-SCNC: 135 MMOL/L (ref 136–145)
TROPONIN I SERPL-MCNC: 0.05 NG/ML (ref 0–0.06)
VENTILATION MODE VENT: (no result)
WBC NRBC COR # BLD AUTO: 33.5 K/UL (ref 4.3–11)

## 2018-02-09 PROCEDURE — A4606 OXYGEN PROBE USED W OXIMETER: HCPCS

## 2018-02-09 PROCEDURE — 5A1955Z RESPIRATORY VENTILATION, GREATER THAN 96 CONSECUTIVE HOURS: ICD-10-PCS | Performed by: INTERNAL MEDICINE

## 2018-02-09 PROCEDURE — A9537 TC99M MEBROFENIN: HCPCS

## 2018-02-09 PROCEDURE — Z7610: HCPCS

## 2018-02-09 RX ADMIN — METRONIDAZOLE SCH MG: 500 TABLET ORAL at 22:10

## 2018-02-09 RX ADMIN — SODIUM CHLORIDE PRN MLS/HR: 9 INJECTION, SOLUTION INTRAVENOUS at 22:26

## 2018-02-09 RX ADMIN — DEXTROSE MONOHYDRATE SCH MLS/HR: 50 INJECTION, SOLUTION INTRAVENOUS at 23:55

## 2018-02-09 RX ADMIN — PIPERACILLIN SODIUM AND TAZOBACTAM SODIUM SCH MLS/HR: .375; 3 INJECTION, POWDER, LYOPHILIZED, FOR SOLUTION INTRAVENOUS at 22:10

## 2018-02-09 RX ADMIN — ENOXAPARIN SODIUM SCH MG: 60 INJECTION SUBCUTANEOUS at 16:57

## 2018-02-09 NOTE — NUR
RT NOTE



PT PLACED ON VENT PER MD ORDER. SETTINGS AS ENDORSED BY PARAMEDICS FROM PT FACE SHEET. AC 16 
400 40% +5. PT HAS PORTEX 8 CUFFED TRACH TUBE IN PLACE. ALARMS SET PER PROTOCOL AND AUDIBLE. 
VENT PLUGGED IN TO RED OUTLET. AMBU BAG AT BED SIDE. NO DISTRESS NOTED. WILL CONTINUE TO 
MONITOR.

-------------------------------------------------------------------------------

Addendum: 02/09/18 at 1403 by ZAKIYA ARTHUR RT

-------------------------------------------------------------------------------

Amended: Links added.

## 2018-02-09 NOTE — NUR
RN ICU

RECEIVED PATENT FROM THE ER. PATIENT IS ALERT AND ORIENTED X 4. NO ACUTE DISTRESS. ABLE TO 
VERBALIZE NEEDS. VENT SETTINGS REVIEWED AND VERIFIED. AFEBRILE. SINUS TACH ON MONITOR. 
TURNED AND REPOSITIONED FOR COMFORT AND WOUND PREVENTION. WILL CONTINUE TO MONITOR AND 
PROVIDE CARE.

## 2018-02-09 NOTE — NUR
BBRA FROM [facility],C/O RIGHT SIDED CHEST PAIN X 1 DAY. NAD NOTED. PT 
IS ALERT, NON VERBAL, BUT ABLE TO MOUTH WORDS. ON VENTILATOR. RT AT BEDSIDE. 
PENDING MD HUERTA.

## 2018-02-09 NOTE — NUR
PT REC'D TRACH PORTEX 8 ON NOTED [facility] VENT SETTINGS AS CHARTED. PT IS COMFORTABLE AND NO 
RESP DISTRESS NOTED. MLT CUFF PRESSURE NOTED. ALARMS ARE SET AND AUDIBLE. AMBU BAG IS 
BEDSIDE. VENT IS PLUGGED INTO RED OUTLET. WILL CONTINUE TO MONITOR

-------------------------------------------------------------------------------

Addendum: 02/09/18 at 2013 by ALYSA MORRIS RT

-------------------------------------------------------------------------------

Amended: Links added.

## 2018-02-09 NOTE — NUR
ICU RN NOTES

Received patient awake,alert on the ventilator via tracheostomy on AC mode.Stable,follows 
commands,seems coherent and appropriate,no SOB,breathing regular and non labored.Moves all 
extremities ,weaker on lower extremities. GT tube clamped,patent., PICC line via TANYA 
,comfort care done,needs attended.

2030 Ultrasound of abdomen being done at the bedside.

## 2018-02-09 NOTE — NUR
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 
AS THE "PULMONARY EMOLISM" AND THE HEMODYNAMICS AND ABGS. NO ACTIVASE PER DR MATIAS.

## 2018-02-10 VITALS — DIASTOLIC BLOOD PRESSURE: 58 MMHG | SYSTOLIC BLOOD PRESSURE: 99 MMHG

## 2018-02-10 VITALS — DIASTOLIC BLOOD PRESSURE: 71 MMHG | SYSTOLIC BLOOD PRESSURE: 109 MMHG

## 2018-02-10 VITALS — SYSTOLIC BLOOD PRESSURE: 102 MMHG | DIASTOLIC BLOOD PRESSURE: 65 MMHG

## 2018-02-10 VITALS — DIASTOLIC BLOOD PRESSURE: 68 MMHG | SYSTOLIC BLOOD PRESSURE: 106 MMHG

## 2018-02-10 VITALS — DIASTOLIC BLOOD PRESSURE: 54 MMHG | SYSTOLIC BLOOD PRESSURE: 92 MMHG

## 2018-02-10 VITALS — SYSTOLIC BLOOD PRESSURE: 85 MMHG | DIASTOLIC BLOOD PRESSURE: 51 MMHG

## 2018-02-10 VITALS — DIASTOLIC BLOOD PRESSURE: 60 MMHG | SYSTOLIC BLOOD PRESSURE: 103 MMHG

## 2018-02-10 VITALS — SYSTOLIC BLOOD PRESSURE: 112 MMHG | DIASTOLIC BLOOD PRESSURE: 47 MMHG

## 2018-02-10 VITALS — DIASTOLIC BLOOD PRESSURE: 67 MMHG | SYSTOLIC BLOOD PRESSURE: 107 MMHG

## 2018-02-10 VITALS — SYSTOLIC BLOOD PRESSURE: 106 MMHG | DIASTOLIC BLOOD PRESSURE: 58 MMHG

## 2018-02-10 VITALS — SYSTOLIC BLOOD PRESSURE: 108 MMHG | DIASTOLIC BLOOD PRESSURE: 59 MMHG

## 2018-02-10 VITALS — DIASTOLIC BLOOD PRESSURE: 67 MMHG | SYSTOLIC BLOOD PRESSURE: 105 MMHG

## 2018-02-10 VITALS — SYSTOLIC BLOOD PRESSURE: 90 MMHG | DIASTOLIC BLOOD PRESSURE: 59 MMHG

## 2018-02-10 VITALS — SYSTOLIC BLOOD PRESSURE: 107 MMHG | DIASTOLIC BLOOD PRESSURE: 54 MMHG

## 2018-02-10 VITALS — DIASTOLIC BLOOD PRESSURE: 51 MMHG | SYSTOLIC BLOOD PRESSURE: 85 MMHG

## 2018-02-10 VITALS — SYSTOLIC BLOOD PRESSURE: 109 MMHG | DIASTOLIC BLOOD PRESSURE: 64 MMHG

## 2018-02-10 VITALS — DIASTOLIC BLOOD PRESSURE: 66 MMHG | SYSTOLIC BLOOD PRESSURE: 107 MMHG

## 2018-02-10 VITALS — SYSTOLIC BLOOD PRESSURE: 105 MMHG | DIASTOLIC BLOOD PRESSURE: 59 MMHG

## 2018-02-10 VITALS — SYSTOLIC BLOOD PRESSURE: 103 MMHG | DIASTOLIC BLOOD PRESSURE: 55 MMHG

## 2018-02-10 VITALS — SYSTOLIC BLOOD PRESSURE: 94 MMHG | DIASTOLIC BLOOD PRESSURE: 66 MMHG

## 2018-02-10 VITALS — DIASTOLIC BLOOD PRESSURE: 64 MMHG | SYSTOLIC BLOOD PRESSURE: 118 MMHG

## 2018-02-10 VITALS — SYSTOLIC BLOOD PRESSURE: 106 MMHG | DIASTOLIC BLOOD PRESSURE: 50 MMHG

## 2018-02-10 VITALS — SYSTOLIC BLOOD PRESSURE: 102 MMHG | DIASTOLIC BLOOD PRESSURE: 62 MMHG

## 2018-02-10 VITALS — DIASTOLIC BLOOD PRESSURE: 51 MMHG | SYSTOLIC BLOOD PRESSURE: 102 MMHG

## 2018-02-10 VITALS — DIASTOLIC BLOOD PRESSURE: 59 MMHG | SYSTOLIC BLOOD PRESSURE: 88 MMHG

## 2018-02-10 VITALS — DIASTOLIC BLOOD PRESSURE: 63 MMHG | SYSTOLIC BLOOD PRESSURE: 103 MMHG

## 2018-02-10 VITALS — DIASTOLIC BLOOD PRESSURE: 51 MMHG | SYSTOLIC BLOOD PRESSURE: 101 MMHG

## 2018-02-10 VITALS — SYSTOLIC BLOOD PRESSURE: 89 MMHG | DIASTOLIC BLOOD PRESSURE: 91 MMHG

## 2018-02-10 VITALS — SYSTOLIC BLOOD PRESSURE: 104 MMHG | DIASTOLIC BLOOD PRESSURE: 55 MMHG

## 2018-02-10 VITALS — SYSTOLIC BLOOD PRESSURE: 94 MMHG | DIASTOLIC BLOOD PRESSURE: 50 MMHG

## 2018-02-10 VITALS — DIASTOLIC BLOOD PRESSURE: 58 MMHG | SYSTOLIC BLOOD PRESSURE: 106 MMHG

## 2018-02-10 VITALS — DIASTOLIC BLOOD PRESSURE: 49 MMHG | SYSTOLIC BLOOD PRESSURE: 86 MMHG

## 2018-02-10 VITALS — DIASTOLIC BLOOD PRESSURE: 53 MMHG | SYSTOLIC BLOOD PRESSURE: 83 MMHG

## 2018-02-10 VITALS — SYSTOLIC BLOOD PRESSURE: 89 MMHG | DIASTOLIC BLOOD PRESSURE: 69 MMHG

## 2018-02-10 VITALS — SYSTOLIC BLOOD PRESSURE: 102 MMHG | DIASTOLIC BLOOD PRESSURE: 66 MMHG

## 2018-02-10 VITALS — SYSTOLIC BLOOD PRESSURE: 101 MMHG | DIASTOLIC BLOOD PRESSURE: 57 MMHG

## 2018-02-10 VITALS — SYSTOLIC BLOOD PRESSURE: 91 MMHG | DIASTOLIC BLOOD PRESSURE: 56 MMHG

## 2018-02-10 LAB
ALBUMIN SERPL BCP-MCNC: 2.2 G/DL (ref 3.4–5)
ALP SERPL-CCNC: 116 U/L (ref 46–116)
ALT SERPL W P-5'-P-CCNC: 97 U/L (ref 12–78)
APTT PPP: 28 SEC (ref 23–34)
AST SERPL W P-5'-P-CCNC: 41 U/L (ref 15–37)
BASOPHILS # BLD AUTO: 0 /CMM (ref 0–0.2)
BASOPHILS NFR BLD AUTO: 0 % (ref 0–2)
BILIRUB DIRECT SERPL-MCNC: 0.2 MG/DL (ref 0–0.2)
BILIRUB SERPL-MCNC: 0.9 MG/DL (ref 0.2–1)
BUN SERPL-MCNC: 21 MG/DL (ref 7–18)
CALCIUM SERPL-MCNC: 7.9 MG/DL (ref 8.5–10.1)
CHLORIDE SERPL-SCNC: 106 MMOL/L (ref 98–107)
CHOLEST SERPL-MCNC: 130 MG/DL (ref ?–200)
CK MB SERPL-MCNC: 1.5 NG/ML (ref 0–3.6)
CO2 SERPL-SCNC: 33 MMOL/L (ref 21–32)
CREAT SERPL-MCNC: 0.6 MG/DL (ref 0.6–1.3)
EOSINOPHIL # BLD AUTO: 0.2 /CMM (ref 0–0.7)
EOSINOPHIL NFR BLD AUTO: 1.2 % (ref 0–6)
GLUCOSE SERPL-MCNC: 78 MG/DL (ref 74–106)
HCT VFR BLD AUTO: 32 % (ref 33–45)
HDLC SERPL-MCNC: 60 MG/DL (ref 40–60)
HGB BLD-MCNC: 10.2 G/DL (ref 11.5–14.8)
INR PPP: 1.01 (ref 0.87–1.13)
IRON SERPL-MCNC: 32 UG/DL (ref 50–175)
LDLC SERPL DIRECT ASSAY-MCNC: 52 MG/DL (ref 0–99)
LYMPHOCYTES NFR BLD AUTO: 1.6 /CMM (ref 0.8–4.8)
LYMPHOCYTES NFR BLD AUTO: 8.4 % (ref 20–44)
LYMPHOCYTES NFR BLD MANUAL: 12 % (ref 16–48)
MAGNESIUM SERPL-MCNC: 1.9 MG/DL (ref 1.8–2.4)
MCH RBC QN AUTO: 28 PG (ref 26–33)
MCHC RBC AUTO-ENTMCNC: 32 G/DL (ref 31–36)
MCV RBC AUTO: 88 FL (ref 82–100)
MONOCYTES NFR BLD AUTO: 0.1 /CMM (ref 0.1–1.3)
MONOCYTES NFR BLD AUTO: 0.7 % (ref 2–12)
MONOCYTES NFR BLD MANUAL: 2 % (ref 0–11)
NEUTROPHILS # BLD AUTO: 16.8 /CMM (ref 1.8–8.9)
NEUTROPHILS NFR BLD AUTO: 89.7 % (ref 43–81)
NEUTS SEG NFR BLD MANUAL: 86 % (ref 42–76)
PHOSPHATE SERPL-MCNC: 2.4 MG/DL (ref 2.5–4.9)
PLATELET # BLD AUTO: 252 /CMM (ref 150–450)
POTASSIUM SERPL-SCNC: 4.3 MMOL/L (ref 3.5–5.1)
PREALB SERPL-MCNC: 26.7 MG/DL (ref 18–35.7)
PROT SERPL-MCNC: 4.9 G/DL (ref 6.4–8.2)
RBC # BLD AUTO: 3.6 MIL/UL (ref 4–5.2)
RDW COEFFICIENT OF VARIATION: 17.6 (ref 11.5–15)
SODIUM SERPL-SCNC: 140 MMOL/L (ref 136–145)
TIBC SERPL-MCNC: 252 UG/DL (ref 250–450)
TRIGL SERPL-MCNC: 162 MG/DL (ref 30–150)
TSH SERPL DL<=0.005 MIU/L-ACNC: 0.69 UIU/ML (ref 0.36–3.74)
WBC NRBC COR # BLD AUTO: 18.7 K/UL (ref 4.3–11)

## 2018-02-10 RX ADMIN — Medication PRN ML: at 11:20

## 2018-02-10 RX ADMIN — Medication SCH GM: at 21:24

## 2018-02-10 RX ADMIN — PIPERACILLIN SODIUM AND TAZOBACTAM SODIUM SCH MLS/HR: .375; 3 INJECTION, POWDER, LYOPHILIZED, FOR SOLUTION INTRAVENOUS at 09:30

## 2018-02-10 RX ADMIN — SODIUM CHLORIDE PRN MLS/HR: 9 INJECTION, SOLUTION INTRAVENOUS at 11:19

## 2018-02-10 RX ADMIN — METRONIDAZOLE SCH MG: 500 TABLET ORAL at 21:22

## 2018-02-10 RX ADMIN — PIPERACILLIN SODIUM AND TAZOBACTAM SODIUM SCH MLS/HR: .375; 3 INJECTION, POWDER, LYOPHILIZED, FOR SOLUTION INTRAVENOUS at 15:41

## 2018-02-10 RX ADMIN — Medication SCH GM: at 15:08

## 2018-02-10 RX ADMIN — ENOXAPARIN SODIUM SCH MG: 60 INJECTION SUBCUTANEOUS at 04:42

## 2018-02-10 RX ADMIN — RIVAROXABAN SCH MG: 10 TABLET, FILM COATED ORAL at 17:24

## 2018-02-10 RX ADMIN — DEXTROSE MONOHYDRATE SCH MLS/HR: 50 INJECTION, SOLUTION INTRAVENOUS at 21:20

## 2018-02-10 RX ADMIN — Medication PRN OZ: at 03:51

## 2018-02-10 RX ADMIN — METRONIDAZOLE SCH MG: 500 TABLET ORAL at 14:12

## 2018-02-10 RX ADMIN — PIPERACILLIN SODIUM AND TAZOBACTAM SODIUM SCH MLS/HR: .375; 3 INJECTION, POWDER, LYOPHILIZED, FOR SOLUTION INTRAVENOUS at 23:01

## 2018-02-10 RX ADMIN — DEXTROSE MONOHYDRATE SCH MLS/HR: 50 INJECTION, SOLUTION INTRAVENOUS at 08:09

## 2018-02-10 RX ADMIN — PIPERACILLIN SODIUM AND TAZOBACTAM SODIUM SCH MLS/HR: .375; 3 INJECTION, POWDER, LYOPHILIZED, FOR SOLUTION INTRAVENOUS at 03:50

## 2018-02-10 RX ADMIN — METRONIDAZOLE SCH MG: 500 TABLET ORAL at 04:41

## 2018-02-10 NOTE — NUR
ICU RN NOTES

Remains stable,asleep,not in distress,arousable,comfortable,denies any pain.Report given to 
Thom MCKEE.

## 2018-02-10 NOTE — NUR
TOMÁS RN NOTES

 

RECEIVED PATIENT FROM ICU FROM RN SAM, ON VENT SETTINGS AS ORDERED, SATURATED WELL, VITAL 
SIGNS STABLE. MADE COMFORTABLE, NO DISTRESS NOTED, NO CO OF PAIN, DAUGHTER AT BEDSIDE, WILL 
CONTINUE TO MONITOR.

## 2018-02-10 NOTE — NUR
ICU RN NOTES

Remains stable,awake,alert,not in distress.Skin care done,patient incontinent of urine.Noted 
urine to be foul smelly,perineal area excoriated  and sacral area with diffuse area of MASD 
with some open areas .

Applied Z Guard ointment all over perineal area.

## 2018-02-10 NOTE — NUR
RN ICU

RECEIVED PATIENT FROM THE PREVIOUS SHIFT. PATIENT IS IN BED. RESTING COMFORTABLY. NO ACUTE 
DISTRESS NOTED. VITAL SINGS STABLE. SINUS TACH ON MONITOR. VENT SETTINGS REVIEWED AND 
VERIFIED. TURNED AND REPOSITIONED FOR COMFORT AND WOUND PREVENTION. WILL CONTINUE TO MONITOR 
AND PROVIDE CARE.

## 2018-02-10 NOTE — NUR
TOMÁS/RN NOTES:



RECEIVED PT. IN BED W/ HOB ELEVATED ON MECHANICAL VENTILATOR W/ PRESCRIBED SETTING 
TOLERATING WELL. PT. IS A/O X 4. ABLE TO MOUTH WORDS. DENIES ANY C/O CHEST PAIN OR SOB AT 
THIS TIME. HAS A PICC LINE ON TANYA W/ DRESSING INTACT AND PATENT W/ NO S/S OF 
INFECTION/INFILTRATION NOTED. HAS IVF OF NS AT 75 CC/HR TOLERATING WELL. HAS GTF INPLACE AND 
PATENT W/ NO RESIDUAL NOTED. ON TELE MONITOR W/ ATRIAL FLUTTER 86. CALL LIGHT W/ REACH. ALL 
NEEDS MEET. SUCTIONED PRN. WILL CONTINUE TO MONITOR.

## 2018-02-10 NOTE — NUR
RT END OF THE SHIFT REPORT 

PT. 67 Y OLD ALERT AND RESPONSIVE 

FEMALE REMAIN PORTEX # 8 AND ON VENT WITH NOTED SETTINGS, ALARMS ARE SET AND FUNCTIONAL.

NO DISTRESS NOTED T/O SHIFT. B/S BILATERALLY RHONCHI. EQUAL CHEST RISE NOTED. SUX'D FOR 
MINIMUM AMT. YELLOWISH SECRETIONS, FIO2 TITRATED PER DR. HOSKINS TO 35 AND TRANSFERS TO TOMÁS 
T/O SHIFT

NO OTHER CHANGES AND CONTINUE FOR MONITOR AND CARE FOR PT. 

AMBU BAG REMAIN AT THE BEDSIDE. VENT PLUGGED INTO RED OUTLET.

HME CHANGED, REPORT WILL PASS TO PM SHIFT.

-------------------------------------------------------------------------------

Addendum: 02/10/18 at 1816 by VIV JIMENEZ RT

-------------------------------------------------------------------------------

Amended: Links added.

## 2018-02-10 NOTE — NUR
TOMÁS RN END NOTES

PATIENT RESTING IN BED NO SIGNS OF DISTRESS, PATIENT AND FAMILY REQUESTING NORCO FOR PAIN, 
NOTED MEDICATION DC EARLIER TODAY, NOTIFIED DR KATHYA BAILON TO GIVE, WILL ENDORSE TO NIGHT 
SHIFT.

## 2018-02-11 VITALS — DIASTOLIC BLOOD PRESSURE: 62 MMHG | SYSTOLIC BLOOD PRESSURE: 101 MMHG

## 2018-02-11 VITALS — SYSTOLIC BLOOD PRESSURE: 112 MMHG | DIASTOLIC BLOOD PRESSURE: 58 MMHG

## 2018-02-11 VITALS — DIASTOLIC BLOOD PRESSURE: 55 MMHG | SYSTOLIC BLOOD PRESSURE: 96 MMHG

## 2018-02-11 VITALS — DIASTOLIC BLOOD PRESSURE: 57 MMHG | SYSTOLIC BLOOD PRESSURE: 98 MMHG

## 2018-02-11 VITALS — DIASTOLIC BLOOD PRESSURE: 55 MMHG | SYSTOLIC BLOOD PRESSURE: 99 MMHG

## 2018-02-11 VITALS — SYSTOLIC BLOOD PRESSURE: 132 MMHG | DIASTOLIC BLOOD PRESSURE: 68 MMHG

## 2018-02-11 LAB
BASOPHILS # BLD AUTO: 0 /CMM (ref 0–0.2)
BASOPHILS NFR BLD AUTO: 0.1 % (ref 0–2)
BUN SERPL-MCNC: 13 MG/DL (ref 7–18)
CALCIUM SERPL-MCNC: 7.9 MG/DL (ref 8.5–10.1)
CHLORIDE SERPL-SCNC: 108 MMOL/L (ref 98–107)
CO2 SERPL-SCNC: 29 MMOL/L (ref 21–32)
CREAT SERPL-MCNC: 0.7 MG/DL (ref 0.6–1.3)
EOSINOPHIL # BLD AUTO: 0.2 /CMM (ref 0–0.7)
EOSINOPHIL NFR BLD AUTO: 1.5 % (ref 0–6)
GLUCOSE SERPL-MCNC: 103 MG/DL (ref 74–106)
HCT VFR BLD AUTO: 29 % (ref 33–45)
HGB BLD-MCNC: 9.5 G/DL (ref 11.5–14.8)
LYMPHOCYTES NFR BLD AUTO: 1.3 /CMM (ref 0.8–4.8)
LYMPHOCYTES NFR BLD AUTO: 9.8 % (ref 20–44)
MAGNESIUM SERPL-MCNC: 1.8 MG/DL (ref 1.8–2.4)
MCH RBC QN AUTO: 28 PG (ref 26–33)
MCHC RBC AUTO-ENTMCNC: 33 G/DL (ref 31–36)
MCV RBC AUTO: 87 FL (ref 82–100)
MONOCYTES NFR BLD AUTO: 0.5 /CMM (ref 0.1–1.3)
MONOCYTES NFR BLD AUTO: 3.8 % (ref 2–12)
NEUTROPHILS # BLD AUTO: 11.3 /CMM (ref 1.8–8.9)
NEUTROPHILS NFR BLD AUTO: 84.8 % (ref 43–81)
PHOSPHATE SERPL-MCNC: 3 MG/DL (ref 2.5–4.9)
PLATELET # BLD AUTO: 211 /CMM (ref 150–450)
POTASSIUM SERPL-SCNC: 4.3 MMOL/L (ref 3.5–5.1)
RBC # BLD AUTO: 3.33 MIL/UL (ref 4–5.2)
RDW COEFFICIENT OF VARIATION: 17.9 (ref 11.5–15)
SODIUM SERPL-SCNC: 143 MMOL/L (ref 136–145)
WBC NRBC COR # BLD AUTO: 13.3 K/UL (ref 4.3–11)

## 2018-02-11 RX ADMIN — SODIUM CHLORIDE PRN MLS/HR: 9 INJECTION, SOLUTION INTRAVENOUS at 03:24

## 2018-02-11 RX ADMIN — Medication SCH GM: at 08:17

## 2018-02-11 RX ADMIN — SODIUM CHLORIDE PRN MLS/HR: 9 INJECTION, SOLUTION INTRAVENOUS at 16:40

## 2018-02-11 RX ADMIN — Medication SCH GM: at 20:56

## 2018-02-11 RX ADMIN — MUPIROCIN SCH GM: 20 OINTMENT TOPICAL at 13:00

## 2018-02-11 RX ADMIN — DEXTROSE MONOHYDRATE SCH MLS/HR: 50 INJECTION, SOLUTION INTRAVENOUS at 08:17

## 2018-02-11 RX ADMIN — Medication PRN OZ: at 08:17

## 2018-02-11 RX ADMIN — HYDROMORPHONE HYDROCHLORIDE PRN MG: 1 INJECTION, SOLUTION INTRAMUSCULAR; INTRAVENOUS; SUBCUTANEOUS at 23:44

## 2018-02-11 RX ADMIN — METRONIDAZOLE SCH MG: 500 TABLET ORAL at 20:55

## 2018-02-11 RX ADMIN — RIVAROXABAN SCH MG: 10 TABLET, FILM COATED ORAL at 16:48

## 2018-02-11 RX ADMIN — PIPERACILLIN SODIUM AND TAZOBACTAM SODIUM SCH MLS/HR: .375; 3 INJECTION, POWDER, LYOPHILIZED, FOR SOLUTION INTRAVENOUS at 04:42

## 2018-02-11 RX ADMIN — PIPERACILLIN SODIUM AND TAZOBACTAM SODIUM SCH MLS/HR: .375; 3 INJECTION, POWDER, LYOPHILIZED, FOR SOLUTION INTRAVENOUS at 22:12

## 2018-02-11 RX ADMIN — METRONIDAZOLE SCH MG: 500 TABLET ORAL at 04:42

## 2018-02-11 RX ADMIN — DEXTROSE MONOHYDRATE SCH MLS/HR: 50 INJECTION, SOLUTION INTRAVENOUS at 20:55

## 2018-02-11 RX ADMIN — PIPERACILLIN SODIUM AND TAZOBACTAM SODIUM SCH MLS/HR: .375; 3 INJECTION, POWDER, LYOPHILIZED, FOR SOLUTION INTRAVENOUS at 09:17

## 2018-02-11 RX ADMIN — METRONIDAZOLE SCH MG: 500 TABLET ORAL at 12:44

## 2018-02-11 RX ADMIN — Medication PRN ML: at 17:41

## 2018-02-11 RX ADMIN — PIPERACILLIN SODIUM AND TAZOBACTAM SODIUM SCH MLS/HR: .375; 3 INJECTION, POWDER, LYOPHILIZED, FOR SOLUTION INTRAVENOUS at 16:40

## 2018-02-11 NOTE — NUR
RN CLOSING NOTES:



NO ACUTE CHANGES NOTED W/IN SHIFT. PT TOLERATED MV VIA TRACH, NO SOB. ON TELEMONITOR, STILL 
A.FLUTTER. GT KEPT PATENT & INTACT, TUBE FEEDING FIBERSOURCE X 40 CC/HR TOLERATED WELL. TANYA 
PICC LINE, KEPT PATENT & INTACT W/ NO S/SX OF INFECTION/INFILTRATION NOTED, ON NS X 75 CC/HR 
INFUSING WELL. KEPT WELL RESTED. NEEDS ATTENDED. BED KEPT LOW & IN LOCKED POS. CALL LIGHT 
PLACED W/IN REACH. WILL ENDORSE TO PM RN FOR PHONG. FAMILY AT BEDSIDE.

## 2018-02-11 NOTE — NUR
Awake and alert trach pt received on mechanical vent.  Pt trach is secure.  Vent is plugged 
into a red outlet, alarms are set and audible, and BVM is at bedside.

-------------------------------------------------------------------------------

Addendum: 02/11/18 at 0749 by ISIDRO DUTTA RT

-------------------------------------------------------------------------------

Amended: Links added.

## 2018-02-11 NOTE — NUR
RN INITIAL NOTES,



RECEIVED PATIENT IN BED, ALERT AND ORIENTED, ABLE TO LET NEEDS AND CONCERNS KNOW,FAMILY AT 
BEDSIDE AT THIS TIME, ON VENT SETTINGS, NO S/S OF SOB/ACUTE DISTRESS NOTED, WITH TANYA PICC 
LINE INTACT AND PATENT, IVF RUNNING WELL AND PATIENT TOLERATED WELL, GTF INFUSING WELL AND 
PATIENT TOLERATED WELL, WITH ZERO RESIDUAL AT THIS TIME, HOB AT ALL TIMES, BED LOCKED AND IN 
LOWEST POSITION, CALL LIGHT W/I REACH, NOTED DRY AND CLEAN AT THIS TIME. WILL CONTINUE TO 
MONITOR CLOSELY.

## 2018-02-11 NOTE — NUR
RN INITIAL NOTES:



REC'D PT AWAKE ON BED, A/O X 4, NOT IN ANY DISTRESS, ABLE TO MAKE NEEDS KNOWN, MOUTHS WORDS. 
ON MV VIA TRACH, NO SOB. ON TELEMONITOR, A.FLUTTER W/ HR 75 BPM. HAS GT PATENT & INTACT, ON 
CONT TUBE FEEDING FIBERSOURCE X 40 CC/HR INFUSING WELL. HAS TANYA PICC LINE, PATENT & INTACT 
W/ NO S/SX OF INFECTION/INFILTRATION NOTED, ON NS X 75 CC/HR INFUSING WELL. PROVIDED COMFORT 
& SAFETY MEASURES. BED KEPT LOW & IN LOCKED POS. CALL LIGHT PLACED W/IN REACH. WILL CONTINUE 
TO MONITOR & ATTEND PT NEEDS.

## 2018-02-12 VITALS — DIASTOLIC BLOOD PRESSURE: 65 MMHG | SYSTOLIC BLOOD PRESSURE: 95 MMHG

## 2018-02-12 VITALS — SYSTOLIC BLOOD PRESSURE: 111 MMHG | DIASTOLIC BLOOD PRESSURE: 65 MMHG

## 2018-02-12 VITALS — SYSTOLIC BLOOD PRESSURE: 114 MMHG | DIASTOLIC BLOOD PRESSURE: 61 MMHG

## 2018-02-12 VITALS — DIASTOLIC BLOOD PRESSURE: 51 MMHG | SYSTOLIC BLOOD PRESSURE: 100 MMHG

## 2018-02-12 VITALS — DIASTOLIC BLOOD PRESSURE: 55 MMHG | SYSTOLIC BLOOD PRESSURE: 106 MMHG

## 2018-02-12 VITALS — DIASTOLIC BLOOD PRESSURE: 54 MMHG | SYSTOLIC BLOOD PRESSURE: 104 MMHG

## 2018-02-12 LAB
ALBUMIN SERPL BCP-MCNC: 2 G/DL (ref 3.4–5)
ALP SERPL-CCNC: 117 U/L (ref 46–116)
ALT SERPL W P-5'-P-CCNC: 63 U/L (ref 12–78)
AST SERPL W P-5'-P-CCNC: 36 U/L (ref 15–37)
BASOPHILS # BLD AUTO: 0 /CMM (ref 0–0.2)
BASOPHILS NFR BLD AUTO: 0.3 % (ref 0–2)
BILIRUB DIRECT SERPL-MCNC: 0.1 MG/DL (ref 0–0.2)
BILIRUB SERPL-MCNC: 0.5 MG/DL (ref 0.2–1)
BUN SERPL-MCNC: 12 MG/DL (ref 7–18)
CALCIUM SERPL-MCNC: 8.3 MG/DL (ref 8.5–10.1)
CHLORIDE SERPL-SCNC: 112 MMOL/L (ref 98–107)
CO2 SERPL-SCNC: 29 MMOL/L (ref 21–32)
CREAT SERPL-MCNC: 0.6 MG/DL (ref 0.6–1.3)
EOSINOPHIL # BLD AUTO: 0.2 /CMM (ref 0–0.7)
EOSINOPHIL NFR BLD AUTO: 1.9 % (ref 0–6)
GLUCOSE SERPL-MCNC: 111 MG/DL (ref 74–106)
HCT VFR BLD AUTO: 28 % (ref 33–45)
HGB BLD-MCNC: 9.1 G/DL (ref 11.5–14.8)
LYMPHOCYTES NFR BLD AUTO: 1.2 /CMM (ref 0.8–4.8)
LYMPHOCYTES NFR BLD AUTO: 10.8 % (ref 20–44)
MAGNESIUM SERPL-MCNC: 1.6 MG/DL (ref 1.8–2.4)
MCH RBC QN AUTO: 29 PG (ref 26–33)
MCHC RBC AUTO-ENTMCNC: 33 G/DL (ref 31–36)
MCV RBC AUTO: 88 FL (ref 82–100)
MONOCYTES NFR BLD AUTO: 0.6 /CMM (ref 0.1–1.3)
MONOCYTES NFR BLD AUTO: 5.3 % (ref 2–12)
NEUTROPHILS # BLD AUTO: 9.2 /CMM (ref 1.8–8.9)
NEUTROPHILS NFR BLD AUTO: 81.7 % (ref 43–81)
PHOSPHATE SERPL-MCNC: 3.9 MG/DL (ref 2.5–4.9)
PLATELET # BLD AUTO: 192 /CMM (ref 150–450)
POTASSIUM SERPL-SCNC: 4.1 MMOL/L (ref 3.5–5.1)
PROT SERPL-MCNC: 4.8 G/DL (ref 6.4–8.2)
RBC # BLD AUTO: 3.18 MIL/UL (ref 4–5.2)
RDW COEFFICIENT OF VARIATION: 18.5 (ref 11.5–15)
SODIUM SERPL-SCNC: 147 MMOL/L (ref 136–145)
WBC NRBC COR # BLD AUTO: 11.2 K/UL (ref 4.3–11)

## 2018-02-12 RX ADMIN — DEXTROSE MONOHYDRATE SCH MLS/HR: 50 INJECTION, SOLUTION INTRAVENOUS at 09:38

## 2018-02-12 RX ADMIN — Medication SCH GM: at 21:04

## 2018-02-12 RX ADMIN — MUPIROCIN SCH GM: 20 OINTMENT TOPICAL at 01:29

## 2018-02-12 RX ADMIN — RIVAROXABAN SCH MG: 10 TABLET, FILM COATED ORAL at 16:20

## 2018-02-12 RX ADMIN — METRONIDAZOLE SCH MG: 500 TABLET ORAL at 04:28

## 2018-02-12 RX ADMIN — Medication PRN ML: at 04:20

## 2018-02-12 RX ADMIN — METRONIDAZOLE SCH MG: 500 TABLET ORAL at 20:54

## 2018-02-12 RX ADMIN — PIPERACILLIN SODIUM AND TAZOBACTAM SODIUM SCH MLS/HR: .375; 3 INJECTION, POWDER, LYOPHILIZED, FOR SOLUTION INTRAVENOUS at 15:35

## 2018-02-12 RX ADMIN — PIPERACILLIN SODIUM AND TAZOBACTAM SODIUM SCH MLS/HR: .375; 3 INJECTION, POWDER, LYOPHILIZED, FOR SOLUTION INTRAVENOUS at 22:49

## 2018-02-12 RX ADMIN — METOPROLOL TARTRATE SCH MG: 25 TABLET, FILM COATED ORAL at 20:56

## 2018-02-12 RX ADMIN — Medication SCH GM: at 09:44

## 2018-02-12 RX ADMIN — SODIUM CHLORIDE PRN MLS/HR: 9 INJECTION, SOLUTION INTRAVENOUS at 09:06

## 2018-02-12 RX ADMIN — DEXTROSE MONOHYDRATE SCH MLS/HR: 50 INJECTION, SOLUTION INTRAVENOUS at 20:54

## 2018-02-12 RX ADMIN — MUPIROCIN SCH GM: 20 OINTMENT TOPICAL at 12:25

## 2018-02-12 RX ADMIN — METRONIDAZOLE SCH MG: 500 TABLET ORAL at 12:25

## 2018-02-12 RX ADMIN — PIPERACILLIN SODIUM AND TAZOBACTAM SODIUM SCH MLS/HR: .375; 3 INJECTION, POWDER, LYOPHILIZED, FOR SOLUTION INTRAVENOUS at 10:32

## 2018-02-12 RX ADMIN — Medication SCH TAB: at 18:37

## 2018-02-12 RX ADMIN — PIPERACILLIN SODIUM AND TAZOBACTAM SODIUM SCH MLS/HR: .375; 3 INJECTION, POWDER, LYOPHILIZED, FOR SOLUTION INTRAVENOUS at 04:28

## 2018-02-12 RX ADMIN — HYDROMORPHONE HYDROCHLORIDE PRN MG: 1 INJECTION, SOLUTION INTRAMUSCULAR; INTRAVENOUS; SUBCUTANEOUS at 23:02

## 2018-02-12 NOTE — NUR
opening note:

RECEIVED PT ON BED, VENT/ TRACH DEPENDENT , ALERT , ABLE TO MOUTH WORDS , RESPIRATION EVEN 
AND UNLABORED, NO SOB NOTED, ON TELE A-FLUTTER HR IN 90'S , TF, FIBERSOURCE AT 40CC/HR 
RUNNING VIA GT , TOLERATING WELL, L UPPER ARM PICC LINE SITE CDI,  WITH NS AT 75 CC/HR , BED 
LOCKED AND IN LOWEST POSITION , CALL LIGHTS WITHIN EASY REACH, WILL CONTINUE TO MONITOR .

## 2018-02-12 NOTE — NUR
RN CLOSING NOTES,



PATIENT IN BED, AWAKE ALERT AND ORIENTED, ON VENT SETTINGS, NO S/S OF SOB/ACUTE DISTRESS, 
SUCTIONING PROVIDED, PICC LINE IN TANYA INTACT AND PATENT, IVF INFUSING WELL AND PATIENT 
TOLERATED WELL, GTF RUNNING WELL AND PATIENT TOLERATED WELL TO, NO RESIDUAL AT THIS TIME, 
KEPT DRY AND CLEAN, REPOSITION Q2HRSN AND PRN FOR COMFORT, HOB ELEVATED AT ALL TIMES  FOR 
ASPIRATION PRECAUTIONS, BED LOCKED AND IN LOWEST POSITION, BRITTNEY LIGHT W/I REACH, WILL ENDORSE 
TO NEXT SHIFT NURSE.

## 2018-02-12 NOTE — NUR
CLOSING NOTES 

MD RASHEED RECONCILE MEDICATIONS NORCO NOT ORDERED. SPOUSE REQUESTING MEDICATION OTHER PAIN 
MEDICATIONS ORDERED OFFERED TO PT WHO REFUSED. PT VITAL SIGNS STABLE AFIBRILE NO SOB REMAINS 
ON VENTILATOR.

## 2018-02-13 VITALS — DIASTOLIC BLOOD PRESSURE: 69 MMHG | SYSTOLIC BLOOD PRESSURE: 115 MMHG

## 2018-02-13 VITALS — SYSTOLIC BLOOD PRESSURE: 109 MMHG | DIASTOLIC BLOOD PRESSURE: 63 MMHG

## 2018-02-13 VITALS — SYSTOLIC BLOOD PRESSURE: 119 MMHG | DIASTOLIC BLOOD PRESSURE: 54 MMHG

## 2018-02-13 VITALS — DIASTOLIC BLOOD PRESSURE: 56 MMHG | SYSTOLIC BLOOD PRESSURE: 97 MMHG

## 2018-02-13 VITALS — DIASTOLIC BLOOD PRESSURE: 54 MMHG | SYSTOLIC BLOOD PRESSURE: 103 MMHG

## 2018-02-13 VITALS — DIASTOLIC BLOOD PRESSURE: 54 MMHG | SYSTOLIC BLOOD PRESSURE: 100 MMHG

## 2018-02-13 LAB
BASOPHILS # BLD AUTO: 0 /CMM (ref 0–0.2)
BASOPHILS NFR BLD AUTO: 0.4 % (ref 0–2)
BUN SERPL-MCNC: 10 MG/DL (ref 7–18)
CALCIUM SERPL-MCNC: 8.3 MG/DL (ref 8.5–10.1)
CHLORIDE SERPL-SCNC: 107 MMOL/L (ref 98–107)
CO2 SERPL-SCNC: 29 MMOL/L (ref 21–32)
CREAT SERPL-MCNC: 0.6 MG/DL (ref 0.6–1.3)
EOSINOPHIL # BLD AUTO: 0.2 /CMM (ref 0–0.7)
EOSINOPHIL NFR BLD AUTO: 2.1 % (ref 0–6)
GLUCOSE SERPL-MCNC: 100 MG/DL (ref 74–106)
HCT VFR BLD AUTO: 27 % (ref 33–45)
HGB BLD-MCNC: 8.9 G/DL (ref 11.5–14.8)
LYMPHOCYTES NFR BLD AUTO: 1.3 /CMM (ref 0.8–4.8)
LYMPHOCYTES NFR BLD AUTO: 12.7 % (ref 20–44)
MAGNESIUM SERPL-MCNC: 1.6 MG/DL (ref 1.8–2.4)
MCH RBC QN AUTO: 29 PG (ref 26–33)
MCHC RBC AUTO-ENTMCNC: 33 G/DL (ref 31–36)
MCV RBC AUTO: 88 FL (ref 82–100)
MONOCYTES NFR BLD AUTO: 0.6 /CMM (ref 0.1–1.3)
MONOCYTES NFR BLD AUTO: 5.3 % (ref 2–12)
NEUTROPHILS # BLD AUTO: 8.3 /CMM (ref 1.8–8.9)
NEUTROPHILS NFR BLD AUTO: 79.5 % (ref 43–81)
PHOSPHATE SERPL-MCNC: 3.6 MG/DL (ref 2.5–4.9)
PLATELET # BLD AUTO: 178 /CMM (ref 150–450)
POTASSIUM SERPL-SCNC: 3.8 MMOL/L (ref 3.5–5.1)
RBC # BLD AUTO: 3.1 MIL/UL (ref 4–5.2)
RDW COEFFICIENT OF VARIATION: 18.5 (ref 11.5–15)
SODIUM SERPL-SCNC: 143 MMOL/L (ref 136–145)
WBC NRBC COR # BLD AUTO: 10.4 K/UL (ref 4.3–11)

## 2018-02-13 RX ADMIN — DEXTROSE MONOHYDRATE SCH MLS/HR: 50 INJECTION, SOLUTION INTRAVENOUS at 08:25

## 2018-02-13 RX ADMIN — HYDROMORPHONE HYDROCHLORIDE PRN MG: 1 INJECTION, SOLUTION INTRAMUSCULAR; INTRAVENOUS; SUBCUTANEOUS at 13:56

## 2018-02-13 RX ADMIN — METOPROLOL TARTRATE SCH MG: 25 TABLET, FILM COATED ORAL at 21:22

## 2018-02-13 RX ADMIN — Medication SCH GM: at 08:31

## 2018-02-13 RX ADMIN — Medication SCH GM: at 21:00

## 2018-02-13 RX ADMIN — Medication SCH TAB: at 17:22

## 2018-02-13 RX ADMIN — SIMETHICONE SCH MG: 20 SUSPENSION/ DROPS ORAL at 08:26

## 2018-02-13 RX ADMIN — FOLIC ACID SCH MG: 1 TABLET ORAL at 08:25

## 2018-02-13 RX ADMIN — DOCUSATE SODIUM SCH MG: 50 LIQUID ORAL at 17:00

## 2018-02-13 RX ADMIN — METRONIDAZOLE SCH MG: 500 TABLET ORAL at 21:21

## 2018-02-13 RX ADMIN — METRONIDAZOLE SCH MG: 500 TABLET ORAL at 13:56

## 2018-02-13 RX ADMIN — ATORVASTATIN CALCIUM SCH MG: 10 TABLET, FILM COATED ORAL at 21:21

## 2018-02-13 RX ADMIN — RIVAROXABAN SCH MG: 10 TABLET, FILM COATED ORAL at 17:22

## 2018-02-13 RX ADMIN — PIPERACILLIN SODIUM AND TAZOBACTAM SODIUM SCH MLS/HR: .375; 3 INJECTION, POWDER, LYOPHILIZED, FOR SOLUTION INTRAVENOUS at 04:50

## 2018-02-13 RX ADMIN — FUROSEMIDE SCH MG: 20 TABLET ORAL at 08:25

## 2018-02-13 RX ADMIN — PIPERACILLIN SODIUM AND TAZOBACTAM SODIUM SCH MLS/HR: .375; 3 INJECTION, POWDER, LYOPHILIZED, FOR SOLUTION INTRAVENOUS at 17:21

## 2018-02-13 RX ADMIN — MUPIROCIN SCH GM: 20 OINTMENT TOPICAL at 00:21

## 2018-02-13 RX ADMIN — Medication PRN ML: at 04:54

## 2018-02-13 RX ADMIN — SODIUM CHLORIDE PRN MLS/HR: 9 INJECTION, SOLUTION INTRAVENOUS at 03:16

## 2018-02-13 RX ADMIN — PIPERACILLIN SODIUM AND TAZOBACTAM SODIUM SCH MLS/HR: .375; 3 INJECTION, POWDER, LYOPHILIZED, FOR SOLUTION INTRAVENOUS at 10:02

## 2018-02-13 RX ADMIN — METRONIDAZOLE SCH MG: 500 TABLET ORAL at 04:51

## 2018-02-13 RX ADMIN — METOPROLOL TARTRATE SCH MG: 25 TABLET, FILM COATED ORAL at 08:26

## 2018-02-13 RX ADMIN — MUPIROCIN SCH GM: 20 OINTMENT TOPICAL at 13:57

## 2018-02-13 RX ADMIN — OXYCODONE HYDROCHLORIDE AND ACETAMINOPHEN SCH MG: 500 TABLET ORAL at 17:22

## 2018-02-13 RX ADMIN — DOCUSATE SODIUM SCH MG: 50 LIQUID ORAL at 08:25

## 2018-02-13 RX ADMIN — MAGNESIUM SULFATE IN DEXTROSE SCH MLS/HR: 10 INJECTION, SOLUTION INTRAVENOUS at 15:32

## 2018-02-13 RX ADMIN — MAGNESIUM SULFATE IN DEXTROSE SCH MLS/HR: 10 INJECTION, SOLUTION INTRAVENOUS at 13:56

## 2018-02-13 NOTE — NUR
Patient received trached on  vent. Alarms verified and audible. Suctioned and lavaged 
moderate-large amount of thick tan secretions. Vent plugged into red outlet. Ambu bag at 
[facility].

## 2018-02-13 NOTE — NUR
GAVE REPORT TO SAM MCKEE FROM ICU. LEFT PT A/OX4. NO DISTRESS NOTED. NO SOB NOTED. NO PAIN 
NOTED. ALL MORNING MEDICATIONS GIVEN THROUGH G-TUBE. ENDORSED ALL OTHER INFORMATION TO RN.

## 2018-02-13 NOTE — NUR
TELE RN: OPENING NOTE

RECEIVED PT A/OX4. NO DISTRESS NOTED. NO SOB NOTED. ON MECHANICAL VENTILATION SETTING 
INCLUDE PORTEX 7 AC 16 , FIO2 35% PEEP 0. TELE MONITORING CONTROLLED A.FLUTTER. ON 
FIBERSOURCE FEEDING RUNNING AT 40ML/HR. TANYA PICC LINE RUNNING NS AT 75ML/HR. SITE CLEAR AND 
PATENT. ON CONTACT ISOLATION FOR MRSA NARES. RESTING COMFORTABLY IN BED. CALL LIGHT WITHIN 
REACH.

## 2018-02-14 VITALS — SYSTOLIC BLOOD PRESSURE: 111 MMHG | DIASTOLIC BLOOD PRESSURE: 53 MMHG

## 2018-02-14 VITALS — SYSTOLIC BLOOD PRESSURE: 106 MMHG | DIASTOLIC BLOOD PRESSURE: 43 MMHG

## 2018-02-14 VITALS — DIASTOLIC BLOOD PRESSURE: 57 MMHG | SYSTOLIC BLOOD PRESSURE: 102 MMHG

## 2018-02-14 VITALS — DIASTOLIC BLOOD PRESSURE: 51 MMHG | SYSTOLIC BLOOD PRESSURE: 96 MMHG

## 2018-02-14 VITALS — SYSTOLIC BLOOD PRESSURE: 106 MMHG | DIASTOLIC BLOOD PRESSURE: 54 MMHG

## 2018-02-14 VITALS — DIASTOLIC BLOOD PRESSURE: 54 MMHG | SYSTOLIC BLOOD PRESSURE: 96 MMHG

## 2018-02-14 LAB
BASOPHILS # BLD AUTO: 0 /CMM (ref 0–0.2)
BASOPHILS NFR BLD AUTO: 0.3 % (ref 0–2)
BUN SERPL-MCNC: 9 MG/DL (ref 7–18)
CALCIUM SERPL-MCNC: 8.3 MG/DL (ref 8.5–10.1)
CHLORIDE SERPL-SCNC: 105 MMOL/L (ref 98–107)
CO2 SERPL-SCNC: 32 MMOL/L (ref 21–32)
CREAT SERPL-MCNC: 0.6 MG/DL (ref 0.6–1.3)
EOSINOPHIL # BLD AUTO: 0.2 /CMM (ref 0–0.7)
EOSINOPHIL NFR BLD AUTO: 2.2 % (ref 0–6)
GLUCOSE SERPL-MCNC: 109 MG/DL (ref 74–106)
HCT VFR BLD AUTO: 27 % (ref 33–45)
HGB BLD-MCNC: 8.9 G/DL (ref 11.5–14.8)
LYMPHOCYTES NFR BLD AUTO: 1.4 /CMM (ref 0.8–4.8)
LYMPHOCYTES NFR BLD AUTO: 15 % (ref 20–44)
MAGNESIUM SERPL-MCNC: 2 MG/DL (ref 1.8–2.4)
MCH RBC QN AUTO: 29 PG (ref 26–33)
MCHC RBC AUTO-ENTMCNC: 33 G/DL (ref 31–36)
MCV RBC AUTO: 88 FL (ref 82–100)
MONOCYTES NFR BLD AUTO: 0.6 /CMM (ref 0.1–1.3)
MONOCYTES NFR BLD AUTO: 6.4 % (ref 2–12)
NEUTROPHILS # BLD AUTO: 7.1 /CMM (ref 1.8–8.9)
NEUTROPHILS NFR BLD AUTO: 76.1 % (ref 43–81)
PLATELET # BLD AUTO: 204 /CMM (ref 150–450)
POTASSIUM SERPL-SCNC: 3.7 MMOL/L (ref 3.5–5.1)
RBC # BLD AUTO: 3.06 MIL/UL (ref 4–5.2)
RDW COEFFICIENT OF VARIATION: 19.7 (ref 11.5–15)
SODIUM SERPL-SCNC: 140 MMOL/L (ref 136–145)
WBC NRBC COR # BLD AUTO: 9.3 K/UL (ref 4.3–11)

## 2018-02-14 RX ADMIN — FUROSEMIDE SCH MG: 20 TABLET ORAL at 08:38

## 2018-02-14 RX ADMIN — Medication PRN ML: at 04:33

## 2018-02-14 RX ADMIN — SIMETHICONE SCH MG: 20 SUSPENSION/ DROPS ORAL at 08:38

## 2018-02-14 RX ADMIN — HYDROMORPHONE HYDROCHLORIDE PRN MG: 1 INJECTION, SOLUTION INTRAMUSCULAR; INTRAVENOUS; SUBCUTANEOUS at 22:26

## 2018-02-14 RX ADMIN — DOCUSATE SODIUM SCH MG: 50 LIQUID ORAL at 08:37

## 2018-02-14 RX ADMIN — MUPIROCIN SCH GM: 20 OINTMENT TOPICAL at 01:00

## 2018-02-14 RX ADMIN — MUPIROCIN SCH GM: 20 OINTMENT TOPICAL at 13:00

## 2018-02-14 RX ADMIN — ATORVASTATIN CALCIUM SCH MG: 10 TABLET, FILM COATED ORAL at 21:21

## 2018-02-14 RX ADMIN — METOPROLOL TARTRATE SCH MG: 25 TABLET, FILM COATED ORAL at 08:37

## 2018-02-14 RX ADMIN — METRONIDAZOLE SCH MG: 500 TABLET ORAL at 04:32

## 2018-02-14 RX ADMIN — METRONIDAZOLE SCH MG: 500 TABLET ORAL at 21:18

## 2018-02-14 RX ADMIN — METRONIDAZOLE SCH MG: 500 TABLET ORAL at 13:12

## 2018-02-14 RX ADMIN — RIVAROXABAN SCH MG: 10 TABLET, FILM COATED ORAL at 16:46

## 2018-02-14 RX ADMIN — HYDROMORPHONE HYDROCHLORIDE PRN MG: 1 INJECTION, SOLUTION INTRAMUSCULAR; INTRAVENOUS; SUBCUTANEOUS at 00:00

## 2018-02-14 RX ADMIN — Medication SCH TAB: at 16:45

## 2018-02-14 RX ADMIN — DOCUSATE SODIUM SCH MG: 50 LIQUID ORAL at 16:46

## 2018-02-14 RX ADMIN — Medication SCH GM: at 21:00

## 2018-02-14 RX ADMIN — OXYCODONE HYDROCHLORIDE AND ACETAMINOPHEN SCH MG: 500 TABLET ORAL at 16:45

## 2018-02-14 RX ADMIN — HYDROMORPHONE HYDROCHLORIDE PRN MG: 1 INJECTION, SOLUTION INTRAMUSCULAR; INTRAVENOUS; SUBCUTANEOUS at 08:38

## 2018-02-14 RX ADMIN — Medication SCH GM: at 08:43

## 2018-02-14 RX ADMIN — FOLIC ACID SCH MG: 1 TABLET ORAL at 08:37

## 2018-02-14 RX ADMIN — METOPROLOL TARTRATE SCH MG: 25 TABLET, FILM COATED ORAL at 21:00

## 2018-02-14 NOTE — NUR
RT

PATIENT REC'D TRACHED ON [facility] VENT WITH SETTINGS SET BY MD FILIPE DUQUE. VENT ALARMS CHECKED + 
AUDIBLE. CUFF PRESSURE CHECKED MLT. SUCTIONED WITH SMALL AMT TAN SEMITHICK SECRETIONS. DIM 
COARSE B/S. PATIENT IN NO DISTRESS AT THIS TIME.  AMBU BAG AT HOB. 

-------------------------------------------------------------------------------

Addendum: 02/14/18 at 0911 by FRANCESCA BARAJAS RT

-------------------------------------------------------------------------------

Amended: Links added.

## 2018-02-14 NOTE — NUR
RN CLOSING NOTE



PT REMAINED STABLE THROUGHOUT THE SHIFT. NO ACUTE DISTRESS NOTED. ALL NEEDS ATTENDED TO 
PATIENT REQUESTING DIET SPEECH EVALUATION ORDERED VIA NP CHRISTAL. PT KEPT CLEAN AND DRY. CALL 
LIGHT WITHIN REACH. RN WILL ENDORSE TO PM RN FOR CONTINUITY OF CARE.

## 2018-02-14 NOTE — NUR
TELE RN INITIAL NOTE 



RN RECEIVED PT A/OX4 RESTING COMFORTABLY IN BED. NO DISTRESS NOTED. NO SOB NOTED. PT REMAIN 
ON ON MECHANICAL VENTILATION SETTING INCLUDE PORTEX 7 AC 16 , FIO2 35% PEEP 0. TELE IN 
PLACE SHOWING CONTROLLED A.FLUTTER. GTF CONTINUED FIBERSOURCE RUNNING AT 40ML/HR. PT HAS TANYA 
PICC LINE RUNNING NS AT 75ML/HR. SITE CLEAR AND PATENT.  CONTACT ISOLATION FOR MRSA NARES 
OBSERVED AND FOLLOWED. CALL LIGHT WITHIN REACH. SAFETY MEASURE IN PLACE RN WILL CONTINUE TO 
FOLLOW THROUGHOUT THE DAY

## 2018-02-14 NOTE — NUR
RN NOTE



RECEIVED PT IN NO ACUTE DISTRESS IN BED. PT IS A/O X 3 AND ABLE TO MAKE NEEDS KNOWN. PT IS 
ON MECHANICAL VENT VIA TRACH. TRACH SITE IS CLEAN DRY AND INTACT. PT TOLERATING VENT SETTING 
WELL WITH O2 SAT @ 100%. PT IS ON TELE WITH AFLUTTER ON THE MONITOR. PT HAS GTUBE THAT IS 
CLEAN DRY INTACT AND PATENT WITH FIBERSOURCE @ 40ML/HR AND TOLERATING WELL WITH 0 RESIDUAL. 
PT HAS TANYA PICC THAT IS CLEAN DRY INTACT AND PATENT WITH NS @ 75ML/HR AND TOLERATING WELL. 
BED IN LOW LOCK POSITION WITH RIALS UP X 2. CALL LIGHT WITHIN REACH AND ALL SAFETY MEASURES 
ENSURED AND CARRIED OUT. WILL CONTINUE TO MONITOR PT.

## 2018-02-15 VITALS — SYSTOLIC BLOOD PRESSURE: 111 MMHG | DIASTOLIC BLOOD PRESSURE: 45 MMHG

## 2018-02-15 VITALS — SYSTOLIC BLOOD PRESSURE: 101 MMHG | DIASTOLIC BLOOD PRESSURE: 51 MMHG

## 2018-02-15 VITALS — SYSTOLIC BLOOD PRESSURE: 115 MMHG | DIASTOLIC BLOOD PRESSURE: 62 MMHG

## 2018-02-15 VITALS — DIASTOLIC BLOOD PRESSURE: 45 MMHG | SYSTOLIC BLOOD PRESSURE: 111 MMHG

## 2018-02-15 VITALS — SYSTOLIC BLOOD PRESSURE: 109 MMHG | DIASTOLIC BLOOD PRESSURE: 59 MMHG

## 2018-02-15 VITALS — DIASTOLIC BLOOD PRESSURE: 54 MMHG | SYSTOLIC BLOOD PRESSURE: 102 MMHG

## 2018-02-15 VITALS — DIASTOLIC BLOOD PRESSURE: 59 MMHG | SYSTOLIC BLOOD PRESSURE: 109 MMHG

## 2018-02-15 LAB
BUN SERPL-MCNC: 9 MG/DL (ref 7–18)
CALCIUM SERPL-MCNC: 8.3 MG/DL (ref 8.5–10.1)
CHLORIDE SERPL-SCNC: 108 MMOL/L (ref 98–107)
CO2 SERPL-SCNC: 32 MMOL/L (ref 21–32)
CREAT SERPL-MCNC: 0.5 MG/DL (ref 0.6–1.3)
GLUCOSE SERPL-MCNC: 105 MG/DL (ref 74–106)
POTASSIUM SERPL-SCNC: 3.9 MMOL/L (ref 3.5–5.1)
SODIUM SERPL-SCNC: 143 MMOL/L (ref 136–145)

## 2018-02-15 RX ADMIN — SODIUM CHLORIDE PRN MLS/HR: 9 INJECTION, SOLUTION INTRAVENOUS at 04:00

## 2018-02-15 RX ADMIN — METRONIDAZOLE SCH MG: 500 TABLET ORAL at 12:35

## 2018-02-15 RX ADMIN — Medication SCH APPLIC: at 21:30

## 2018-02-15 RX ADMIN — OXYCODONE HYDROCHLORIDE AND ACETAMINOPHEN SCH MG: 500 TABLET ORAL at 16:17

## 2018-02-15 RX ADMIN — RIVAROXABAN SCH MG: 10 TABLET, FILM COATED ORAL at 16:17

## 2018-02-15 RX ADMIN — METOPROLOL TARTRATE SCH MG: 25 TABLET, FILM COATED ORAL at 08:29

## 2018-02-15 RX ADMIN — MUPIROCIN SCH GM: 20 OINTMENT TOPICAL at 12:35

## 2018-02-15 RX ADMIN — ATORVASTATIN CALCIUM SCH MG: 10 TABLET, FILM COATED ORAL at 21:27

## 2018-02-15 RX ADMIN — Medication SCH TAB: at 16:17

## 2018-02-15 RX ADMIN — SODIUM CHLORIDE PRN MLS/HR: 9 INJECTION, SOLUTION INTRAVENOUS at 16:16

## 2018-02-15 RX ADMIN — METOPROLOL TARTRATE SCH MG: 25 TABLET, FILM COATED ORAL at 21:27

## 2018-02-15 RX ADMIN — FOLIC ACID SCH MG: 1 TABLET ORAL at 08:28

## 2018-02-15 RX ADMIN — DOCUSATE SODIUM SCH MG: 50 LIQUID ORAL at 08:29

## 2018-02-15 RX ADMIN — FUROSEMIDE SCH MG: 20 TABLET ORAL at 08:28

## 2018-02-15 RX ADMIN — HYDROMORPHONE HYDROCHLORIDE PRN MG: 1 INJECTION, SOLUTION INTRAMUSCULAR; INTRAVENOUS; SUBCUTANEOUS at 18:22

## 2018-02-15 RX ADMIN — METRONIDAZOLE SCH MG: 500 TABLET ORAL at 05:29

## 2018-02-15 RX ADMIN — Medication SCH GM: at 08:29

## 2018-02-15 RX ADMIN — SIMETHICONE SCH MG: 20 SUSPENSION/ DROPS ORAL at 08:30

## 2018-02-15 RX ADMIN — MUPIROCIN SCH GM: 20 OINTMENT TOPICAL at 01:00

## 2018-02-15 RX ADMIN — DOCUSATE SODIUM SCH MG: 50 LIQUID ORAL at 16:17

## 2018-02-15 NOTE — NUR
RN CLOSING NOTE



PT REMAINS IN NO ACUTE DISTRESS IN BED. PT DID NOT HAVE ANY SIGNIFICANT CHANGE IN CONDITION. 
PT TOLERATED VENT SETTING WELL. ALL NEEDS MET, ALL ORDERS CARRIED OUT. WILL ENDORSE CARE TO 
AM RN FOR CONTINUITY OF CARE.

## 2018-02-15 NOTE — NUR
RN NOTES



PATIENT BEING PICKED UP BY [facility] TO DISCHARGE TO [facility]. IN STABLE 
CONDITION, ALERT AND ORIENTED, NON VERBAL. VITAL SIGNS WNL. NO SIGN OR SYMPTOM OF DISTRESS. 
CALLED [facility], SPOKE TO RAMON, ENDORSED PATIENT. SPOKE TO HEAD OF AMBULANCE, ENDORSED 
PATIENT. ALL IV ABT AND FEEDING TURNED OFF AND REMOVED FROM PATIENT.

## 2018-02-15 NOTE — NUR
RN NOTES

PT REMAINED IN STABLE CONDITION THROUGHOUT THE SHIFT, ALL NEEDS MET WILL ENDORSE TO ONCOMING 
SHIFT.

## 2018-02-15 NOTE — NUR
RN NOTES

RECEIVED PT IN STABLE CONDITION, CHRONIC VENT/TRACH DEPENDENT, A&0X3, ABLE TO MOUTH OUT 
WORDS AND MAKE NEEDS KNOWN. A FLUTTER ON THE TELE [place] HR 74. GTF AT 40ML/HR. TANYA PICC LINE 
INTACT IVF AT 75ML/HR. BED LOCKED AND IN LOWEST POSITION, CALL LIGHT WITHIN REACH, WILL CONT 
TO [place].

## 2018-02-15 NOTE — NUR
RN NOTES



RECEIVED PATIENT AWAKE IN BED WITH NO RESPIRATORY DISTRESS OR SHORTNESS OF BREATH. BREATHING 
EVEN AND UNLABORED. NO PHYSICAL MANIFESTATION OF PAIN OR DISCOMFORT. ALERT AND ORIENTED, 
NODS OR SHAKES HEAD TO ANSWER YES OR NO QUESTION. DAUGHTER AND BEDSIDE. WILL BE PICKED UP BY 
AMBULANCE AT 2145. VITAL SIGNS WNL. KEPT CLEAN AND DRY. WILL CONTINUE TO MONITOR.

## 2018-04-19 ENCOUNTER — HOSPITAL ENCOUNTER (EMERGENCY)
Dept: HOSPITAL 54 - ER | Age: 68
Discharge: HOME | End: 2018-04-19
Payer: COMMERCIAL

## 2018-04-19 VITALS — DIASTOLIC BLOOD PRESSURE: 65 MMHG | SYSTOLIC BLOOD PRESSURE: 116 MMHG

## 2018-04-19 VITALS — WEIGHT: 145 LBS | BODY MASS INDEX: 23.3 KG/M2 | HEIGHT: 66 IN

## 2018-04-19 VITALS — DIASTOLIC BLOOD PRESSURE: 69 MMHG | SYSTOLIC BLOOD PRESSURE: 107 MMHG

## 2018-04-19 VITALS — DIASTOLIC BLOOD PRESSURE: 73 MMHG | SYSTOLIC BLOOD PRESSURE: 129 MMHG

## 2018-04-19 DIAGNOSIS — E78.00: ICD-10-CM

## 2018-04-19 DIAGNOSIS — Z88.6: ICD-10-CM

## 2018-04-19 DIAGNOSIS — Z88.8: ICD-10-CM

## 2018-04-19 DIAGNOSIS — Z79.01: ICD-10-CM

## 2018-04-19 DIAGNOSIS — D64.9: ICD-10-CM

## 2018-04-19 DIAGNOSIS — I10: ICD-10-CM

## 2018-04-19 DIAGNOSIS — Z88.5: ICD-10-CM

## 2018-04-19 DIAGNOSIS — K94.23: Primary | ICD-10-CM

## 2018-04-19 PROCEDURE — A4606 OXYGEN PROBE USED W OXIMETER: HCPCS

## 2018-04-19 PROCEDURE — A4623 TRACHEOSTOMY INNER CANNULA: HCPCS

## 2018-04-19 PROCEDURE — Z7610: HCPCS

## 2018-05-02 ENCOUNTER — HOSPITAL ENCOUNTER (EMERGENCY)
Age: 68
Discharge: HOME | End: 2018-05-02
Payer: COMMERCIAL

## 2018-05-02 DIAGNOSIS — Z88.6: ICD-10-CM

## 2018-05-02 DIAGNOSIS — I48.91: ICD-10-CM

## 2018-05-02 DIAGNOSIS — J90: ICD-10-CM

## 2018-05-02 DIAGNOSIS — I10: ICD-10-CM

## 2018-05-02 DIAGNOSIS — Z88.5: ICD-10-CM

## 2018-05-02 DIAGNOSIS — R41.82: Primary | ICD-10-CM

## 2018-05-02 DIAGNOSIS — E78.00: ICD-10-CM

## 2018-05-02 DIAGNOSIS — I48.92: ICD-10-CM

## 2018-05-02 DIAGNOSIS — Z88.8: ICD-10-CM

## 2018-05-02 DIAGNOSIS — R18.8: ICD-10-CM

## 2018-05-02 DIAGNOSIS — D64.9: ICD-10-CM

## 2018-05-02 DIAGNOSIS — Z98.890: ICD-10-CM

## 2018-05-02 DIAGNOSIS — F11.10: ICD-10-CM

## 2018-05-05 ENCOUNTER — HOSPITAL ENCOUNTER (INPATIENT)
Dept: HOSPITAL 54 - ER | Age: 68
LOS: 17 days | Discharge: NURSING, REHAB OR LONG-TERM CARE | DRG: 870 | End: 2018-05-22
Attending: NURSE PRACTITIONER | Admitting: NURSE PRACTITIONER
Payer: COMMERCIAL

## 2018-05-05 VITALS — BODY MASS INDEX: 18.99 KG/M2 | WEIGHT: 121 LBS | HEIGHT: 67 IN

## 2018-05-05 VITALS — DIASTOLIC BLOOD PRESSURE: 90 MMHG | SYSTOLIC BLOOD PRESSURE: 159 MMHG

## 2018-05-05 VITALS — SYSTOLIC BLOOD PRESSURE: 130 MMHG | DIASTOLIC BLOOD PRESSURE: 67 MMHG

## 2018-05-05 VITALS — SYSTOLIC BLOOD PRESSURE: 136 MMHG | DIASTOLIC BLOOD PRESSURE: 78 MMHG

## 2018-05-05 DIAGNOSIS — J90: ICD-10-CM

## 2018-05-05 DIAGNOSIS — I48.91: ICD-10-CM

## 2018-05-05 DIAGNOSIS — S31.40XA: ICD-10-CM

## 2018-05-05 DIAGNOSIS — R21: ICD-10-CM

## 2018-05-05 DIAGNOSIS — A41.9: Primary | ICD-10-CM

## 2018-05-05 DIAGNOSIS — I50.23: ICD-10-CM
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
## 2018-05-05 DIAGNOSIS — K64.8: ICD-10-CM
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
## 2018-05-05 DIAGNOSIS — Z90.2: ICD-10-CM

## 2018-05-05 DIAGNOSIS — Z93.0: ICD-10-CM

## 2018-05-05 DIAGNOSIS — J15.9: ICD-10-CM

## 2018-05-05 DIAGNOSIS — K94.23: ICD-10-CM

## 2018-05-05 DIAGNOSIS — D64.9: ICD-10-CM

## 2018-05-05 DIAGNOSIS — Z86.711: ICD-10-CM

## 2018-05-05 DIAGNOSIS — Z99.11: ICD-10-CM

## 2018-05-05 DIAGNOSIS — E78.5: ICD-10-CM

## 2018-05-05 DIAGNOSIS — I11.0: ICD-10-CM

## 2018-05-05 DIAGNOSIS — K21.9: ICD-10-CM

## 2018-05-05 DIAGNOSIS — J44.0: ICD-10-CM

## 2018-05-05 DIAGNOSIS — L98.8: ICD-10-CM

## 2018-05-05 DIAGNOSIS — Z16.21: ICD-10-CM

## 2018-05-05 DIAGNOSIS — J15.6: ICD-10-CM

## 2018-05-05 DIAGNOSIS — B95.2: ICD-10-CM

## 2018-05-05 DIAGNOSIS — J96.01: ICD-10-CM
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
## 2018-05-05 DIAGNOSIS — R13.10: ICD-10-CM

## 2018-05-05 DIAGNOSIS — R74.0: ICD-10-CM

## 2018-05-05 DIAGNOSIS — N39.0: ICD-10-CM

## 2018-05-05 DIAGNOSIS — K76.0: ICD-10-CM

## 2018-05-05 DIAGNOSIS — R10.9: ICD-10-CM

## 2018-05-05 DIAGNOSIS — E88.09: ICD-10-CM

## 2018-05-05 DIAGNOSIS — I27.20: ICD-10-CM

## 2018-05-05 DIAGNOSIS — D68.59: ICD-10-CM

## 2018-05-05 DIAGNOSIS — E44.0: ICD-10-CM
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
## 2018-05-05 LAB
ALBUMIN SERPL BCP-MCNC: 3.2 G/DL (ref 3.4–5)
ALP SERPL-CCNC: 447 U/L (ref 46–116)
ALT SERPL W P-5'-P-CCNC: 88 U/L (ref 12–78)
APPEARANCE UR: CLEAR
APTT PPP: 26 SEC (ref 23–34)
AST SERPL W P-5'-P-CCNC: 76 U/L (ref 15–37)
BASOPHILS # BLD AUTO: 0 /CMM (ref 0–0.2)
BASOPHILS NFR BLD AUTO: 0.2 % (ref 0–2)
BILIRUB DIRECT SERPL-MCNC: 0.2 MG/DL (ref 0–0.2)
BILIRUB SERPL-MCNC: 0.6 MG/DL (ref 0.2–1)
BILIRUB UR QL STRIP: NEGATIVE
BUN SERPL-MCNC: 16 MG/DL (ref 7–18)
CALCIUM SERPL-MCNC: 9.2 MG/DL (ref 8.5–10.1)
CHLORIDE SERPL-SCNC: 95 MMOL/L (ref 98–107)
CO2 SERPL-SCNC: 40 MMOL/L (ref 21–32)
COLOR UR: YELLOW
CREAT SERPL-MCNC: 0.6 MG/DL (ref 0.6–1.3)
EOSINOPHIL NFR BLD AUTO: 0.1 % (ref 0–6)
GLUCOSE SERPL-MCNC: 152 MG/DL (ref 74–106)
GLUCOSE UR STRIP-MCNC: NEGATIVE MG/DL
HCT VFR BLD AUTO: 35 % (ref 33–45)
HGB BLD-MCNC: 10.7 G/DL (ref 11.5–14.8)
HGB UR QL STRIP: NEGATIVE ERY/UL
INR PPP: 1.22 (ref 0.87–1.13)
KETONES UR STRIP-MCNC: NEGATIVE MG/DL
LEUKOCYTE ESTERASE UR QL STRIP: NEGATIVE
LYMPHOCYTES NFR BLD AUTO: 0.8 /CMM (ref 0.8–4.8)
LYMPHOCYTES NFR BLD AUTO: 5.2 % (ref 20–44)
MCHC RBC AUTO-ENTMCNC: 31 G/DL (ref 31–36)
MCV RBC AUTO: 84 FL (ref 82–100)
MONOCYTES NFR BLD AUTO: 0.8 /CMM (ref 0.1–1.3)
MONOCYTES NFR BLD AUTO: 5.2 % (ref 2–12)
NEUTROPHILS # BLD AUTO: 13.5 /CMM (ref 1.8–8.9)
NEUTROPHILS NFR BLD AUTO: 89.3 % (ref 43–81)
NITRITE UR QL STRIP: NEGATIVE
PH UR STRIP: 7 [PH] (ref 5–8)
PLATELET # BLD AUTO: 275 /CMM (ref 150–450)
POTASSIUM SERPL-SCNC: 4.5 MMOL/L (ref 3.5–5.1)
PROT SERPL-MCNC: 8.7 G/DL (ref 6.4–8.2)
PROT UR QL STRIP: (no result) MG/DL
RBC # BLD AUTO: 4.14 MIL/UL (ref 4–5.2)
RBC #/AREA URNS HPF: (no result) /HPF (ref 0–2)
RDW COEFFICIENT OF VARIATION: 17.8 (ref 11.5–15)
SODIUM SERPL-SCNC: 137 MMOL/L (ref 136–145)
TROPONIN I SERPL-MCNC: 0.03 NG/ML (ref 0–0.06)
UROBILINOGEN UR STRIP-MCNC: 0.2 EU/DL
WBC #/AREA URNS HPF: (no result) /HPF (ref 0–3)
WBC NRBC COR # BLD AUTO: 15.1 K/UL (ref 4.3–11)

## 2018-05-05 PROCEDURE — A6403 STERILE GAUZE>16 <= 48 SQ IN: HCPCS

## 2018-05-05 PROCEDURE — A4606 OXYGEN PROBE USED W OXIMETER: HCPCS

## 2018-05-05 PROCEDURE — 5A1955Z RESPIRATORY VENTILATION, GREATER THAN 96 CONSECUTIVE HOURS: ICD-10-PCS | Performed by: INTERNAL MEDICINE

## 2018-05-05 PROCEDURE — Z7610: HCPCS

## 2018-05-05 PROCEDURE — A4216 STERILE WATER/SALINE, 10 ML: HCPCS

## 2018-05-05 PROCEDURE — A6402 STERILE GAUZE <= 16 SQ IN: HCPCS

## 2018-05-05 RX ADMIN — LEVALBUTEROL HYDROCHLORIDE SCH MG: 1.25 SOLUTION, CONCENTRATE RESPIRATORY (INHALATION) at 23:59

## 2018-05-05 RX ADMIN — ACETAMINOPHEN PRN MG: 325 TABLET ORAL at 18:02

## 2018-05-05 RX ADMIN — FUROSEMIDE SCH MG: 20 TABLET ORAL at 17:25

## 2018-05-05 RX ADMIN — ZOLPIDEM TARTRATE PRN MG: 5 TABLET, FILM COATED ORAL at 22:41

## 2018-05-05 RX ADMIN — DEXTROSE MONOHYDRATE SCH MLS/HR: 50 INJECTION, SOLUTION INTRAVENOUS at 17:50

## 2018-05-05 RX ADMIN — METOPROLOL TARTRATE SCH MG: 25 TABLET, FILM COATED ORAL at 22:40

## 2018-05-05 RX ADMIN — ATORVASTATIN CALCIUM SCH MG: 10 TABLET, FILM COATED ORAL at 22:41

## 2018-05-05 RX ADMIN — DOCUSATE SODIUM SCH MG: 50 LIQUID ORAL at 17:18

## 2018-05-05 RX ADMIN — LEVALBUTEROL HYDROCHLORIDE SCH MG: 1.25 SOLUTION, CONCENTRATE RESPIRATORY (INHALATION) at 15:00

## 2018-05-05 RX ADMIN — OXYCODONE HYDROCHLORIDE AND ACETAMINOPHEN SCH MG: 500 TABLET ORAL at 17:18

## 2018-05-05 RX ADMIN — Medication PRN ML: at 14:00

## 2018-05-05 NOTE — NUR
PATIENT TRANSPORTED TO [facility] VIA ACLS PROTOCOL WITH RN, RT, AND EMT.  RN, SUMI TO 
PROVIDE PHONG.

## 2018-05-05 NOTE — NUR
TO BED 8 BIB PARAMEDICS C/O SOB WITH LOW O2 SAT PER EMS REPORT. PT AAOX2. RT AT 
BEDSIDE PLACE PT ON VENT. PLACE PT ON CARDIAC MONITORING, CONTINUOUS POX. ER MD 
AT BEDSIDE TO EVAL PT WITH ORDERS RECEIVED. WILL CARRY OUT ORDERS.

## 2018-05-05 NOTE — NUR
TELE/RN CLOSING NOTE



PATIENT IN BED IN STABLE CONDITION. A/OX 2-3, ABLE TO MAKE NEEDS KNOWN AND MAKE DECISION. 
TRACH AND VENT DEPENDENT. NO SIGNS OF ACUTE DISTRESS. NO COMPLAIN OF PAIN OR DISCOMFORT. ON 
TELE MONITOR NOTED WITH SINUS TAHCY . ALL NEEDS ATTENDED TO. CALL LIGHT WITHIN REACH. 
WILL ENDORSE TO NEXT SHIFT FOR CONTINUITY OF CARE.

## 2018-05-05 NOTE — NUR
PT REC'D TRACHED PORTEX 7 VIA AMBU BAG BY FIRE DEPARTMENT. PT PLACED ON NOTED MECH VENT 
SETTINGS GIVEN FROM FIRE DEPPARTMENT. NO RESP DISTRESS NOTED. SX'D THICK SMALL AMT OF BLOOD 
SECRETIONS. ALARMS ARE SET AND AUDIBLE, VENT PLUGGED INTO RED OUTLET. AMBU BAG BEDSIDE. WILL 
CONTINUE TO MONITOR

-------------------------------------------------------------------------------

Addendum: 05/05/18 at 0658 by ALYSA MORRIS RT

-------------------------------------------------------------------------------

Amended: Links added.

## 2018-05-05 NOTE — NUR
TELE/RN SEEN BY DR HERNANDEZ



PATIENT SEEN BY DR HERNANDEZ WITH ORDERS TO START XOPENEX 1.25 NEB Q 8 HRS, MADE AWARE PATIENT 
ALLERGIC TO ALBUTEROL PER DR HERNANDEZ OKAY TO GIVE.

## 2018-05-05 NOTE — NUR
TELE/RN ADMISSION



PATIENT TRANSFERRED FROM ER IN STABLE CONDITION. REPORT GIVEN BY ALEXIS MCKEE FROM ER. A/O X 2, 
MOUTH WORDS AND HEAD AND FACIAL GESTURES. NO SIGNS OF ACUTE DISTRESS. NO COMPLAIN OF PAIN OR 
DISCOMFORT. TRACH AND VENT DEPENDENT TOLERATING WELL. G TUBE FLUSHING WELL, LEFT FOREARM 18G 
IV SITE, INTACT AND FLUSHING WELL. ON TELE MONITOR NOTED WITH SINUS TACHY WITH RATE . 
HOB ELEVATED FOR ASPIRATION PRECAUTION. INCONTINENT OF BOWEL AND BLADDER. NO SKIN 
OPENING/WOUND NOTED BESIDES BILATERAL FEET DRYNESS, LEFT BUTTOCK SCATTERED DRYNESS. ALL 
NEEDS ATTENDED TO. CALL LIGHT WITHIN REACH. WILL CONTINUE TO MONITOR TO ENSURE SAFETY. MINI KAUR PAGED FOR ADMISSION ORDERS. AWAITING FOR ADMISSION ORDERS.

## 2018-05-05 NOTE — NUR
RN NOTES



IN BED, LYING COMFORTABLY WITH NO DISTRESS NOTED. BREATHING EVEN AND UNLABORED. NO COMPLAINT 
OF PAIN OR DISCOMFORT. REMAINS AFEBRILE WITH SKIN WARM AN DRY TO TOUCH. ALERT AND ORIENTED, 
ABLE TO VERBALLY COMMUNICATE NEEDS.. KEPT CLEAN AND DRY WILL CONTINUE TO MONITOR.

## 2018-05-06 VITALS — SYSTOLIC BLOOD PRESSURE: 96 MMHG | DIASTOLIC BLOOD PRESSURE: 48 MMHG

## 2018-05-06 VITALS — SYSTOLIC BLOOD PRESSURE: 125 MMHG | DIASTOLIC BLOOD PRESSURE: 75 MMHG

## 2018-05-06 VITALS — SYSTOLIC BLOOD PRESSURE: 116 MMHG | DIASTOLIC BLOOD PRESSURE: 62 MMHG

## 2018-05-06 VITALS — SYSTOLIC BLOOD PRESSURE: 122 MMHG | DIASTOLIC BLOOD PRESSURE: 65 MMHG

## 2018-05-06 VITALS — SYSTOLIC BLOOD PRESSURE: 123 MMHG | DIASTOLIC BLOOD PRESSURE: 83 MMHG

## 2018-05-06 VITALS — DIASTOLIC BLOOD PRESSURE: 83 MMHG | SYSTOLIC BLOOD PRESSURE: 104 MMHG

## 2018-05-06 LAB
BASOPHILS # BLD AUTO: 0 /CMM (ref 0–0.2)
BASOPHILS NFR BLD AUTO: 0.3 % (ref 0–2)
BUN SERPL-MCNC: 16 MG/DL (ref 7–18)
CALCIUM SERPL-MCNC: 8.4 MG/DL (ref 8.5–10.1)
CHLORIDE SERPL-SCNC: 99 MMOL/L (ref 98–107)
CHOLEST SERPL-MCNC: 97 MG/DL (ref ?–200)
CO2 SERPL-SCNC: 39 MMOL/L (ref 21–32)
CREAT SERPL-MCNC: 0.6 MG/DL (ref 0.6–1.3)
EOSINOPHIL NFR BLD AUTO: 0.4 % (ref 0–6)
GLUCOSE SERPL-MCNC: 89 MG/DL (ref 74–106)
HCT VFR BLD AUTO: 29 % (ref 33–45)
HDLC SERPL-MCNC: 64 MG/DL (ref 40–60)
HGB BLD-MCNC: 8.9 G/DL (ref 11.5–14.8)
LDLC SERPL DIRECT ASSAY-MCNC: 29 MG/DL (ref 0–99)
LYMPHOCYTES NFR BLD AUTO: 1.1 /CMM (ref 0.8–4.8)
LYMPHOCYTES NFR BLD AUTO: 15 % (ref 20–44)
MAGNESIUM SERPL-MCNC: 1.6 MG/DL (ref 1.8–2.4)
MCHC RBC AUTO-ENTMCNC: 31 G/DL (ref 31–36)
MCV RBC AUTO: 84 FL (ref 82–100)
MONOCYTES NFR BLD AUTO: 0.6 /CMM (ref 0.1–1.3)
MONOCYTES NFR BLD AUTO: 7.3 % (ref 2–12)
NEUTROPHILS # BLD AUTO: 5.9 /CMM (ref 1.8–8.9)
NEUTROPHILS NFR BLD AUTO: 77 % (ref 43–81)
PHOSPHATE SERPL-MCNC: 2.7 MG/DL (ref 2.5–4.9)
PLATELET # BLD AUTO: 242 /CMM (ref 150–450)
POTASSIUM SERPL-SCNC: 3.5 MMOL/L (ref 3.5–5.1)
RBC # BLD AUTO: 3.41 MIL/UL (ref 4–5.2)
RDW COEFFICIENT OF VARIATION: 17.7 (ref 11.5–15)
SODIUM SERPL-SCNC: 141 MMOL/L (ref 136–145)
TRIGL SERPL-MCNC: 36 MG/DL (ref 30–150)
WBC NRBC COR # BLD AUTO: 7.6 K/UL (ref 4.3–11)

## 2018-05-06 RX ADMIN — MAGNESIUM SULFATE IN DEXTROSE SCH MLS/HR: 10 INJECTION, SOLUTION INTRAVENOUS at 09:19

## 2018-05-06 RX ADMIN — ENOXAPARIN SODIUM SCH MG: 30 INJECTION SUBCUTANEOUS at 08:44

## 2018-05-06 RX ADMIN — LEVALBUTEROL HYDROCHLORIDE SCH MG: 1.25 SOLUTION, CONCENTRATE RESPIRATORY (INHALATION) at 23:13

## 2018-05-06 RX ADMIN — DOCUSATE SODIUM SCH MG: 50 LIQUID ORAL at 08:35

## 2018-05-06 RX ADMIN — ATORVASTATIN CALCIUM SCH MG: 10 TABLET, FILM COATED ORAL at 21:09

## 2018-05-06 RX ADMIN — Medication PRN OZ: at 08:35

## 2018-05-06 RX ADMIN — LEVALBUTEROL HYDROCHLORIDE SCH MG: 1.25 SOLUTION, CONCENTRATE RESPIRATORY (INHALATION) at 14:39

## 2018-05-06 RX ADMIN — MAGNESIUM SULFATE IN DEXTROSE SCH MLS/HR: 10 INJECTION, SOLUTION INTRAVENOUS at 12:56

## 2018-05-06 RX ADMIN — LEVALBUTEROL HYDROCHLORIDE SCH MG: 1.25 SOLUTION, CONCENTRATE RESPIRATORY (INHALATION) at 06:56

## 2018-05-06 RX ADMIN — Medication PRN EACH: at 21:08

## 2018-05-06 RX ADMIN — DEXTROSE MONOHYDRATE SCH MLS/HR: 50 INJECTION, SOLUTION INTRAVENOUS at 16:25

## 2018-05-06 RX ADMIN — METOPROLOL TARTRATE SCH MG: 25 TABLET, FILM COATED ORAL at 08:33

## 2018-05-06 RX ADMIN — SIMETHICONE SCH MG: 20 SUSPENSION/ DROPS ORAL at 18:45

## 2018-05-06 RX ADMIN — OXYCODONE HYDROCHLORIDE AND ACETAMINOPHEN SCH MG: 500 TABLET ORAL at 16:25

## 2018-05-06 RX ADMIN — DOCUSATE SODIUM SCH MG: 50 LIQUID ORAL at 16:25

## 2018-05-06 RX ADMIN — MAGNESIUM SULFATE IN DEXTROSE SCH MLS/HR: 10 INJECTION, SOLUTION INTRAVENOUS at 13:01

## 2018-05-06 RX ADMIN — FOLIC ACID SCH MG: 1 TABLET ORAL at 08:33

## 2018-05-06 RX ADMIN — PANTOPRAZOLE SODIUM SCH MG: 40 TABLET, DELAYED RELEASE ORAL at 08:33

## 2018-05-06 RX ADMIN — FUROSEMIDE SCH MG: 20 TABLET ORAL at 08:33

## 2018-05-06 RX ADMIN — SIMETHICONE SCH MG: 20 SUSPENSION/ DROPS ORAL at 21:09

## 2018-05-06 RX ADMIN — Medication PRN EACH: at 16:44

## 2018-05-06 RX ADMIN — MAGNESIUM SULFATE IN DEXTROSE SCH MLS/HR: 10 INJECTION, SOLUTION INTRAVENOUS at 10:19

## 2018-05-06 RX ADMIN — METOPROLOL TARTRATE SCH MG: 25 TABLET, FILM COATED ORAL at 21:09

## 2018-05-06 RX ADMIN — Medication PRN ML: at 12:45

## 2018-05-06 RX ADMIN — MAGNESIUM SULFATE IN DEXTROSE SCH MLS/HR: 10 INJECTION, SOLUTION INTRAVENOUS at 14:03

## 2018-05-06 NOTE — NUR
PT RCVD TRACHED PORTEX 7 ON VENT WITH NOTED SETTINGS. PT IS ALERT AND AWAKE.  NO RESP 
DISTRESS NOTED. SUCTIONED SMALL AMOUNT OF YELLOW THICK SECRETIONS.  ALARMS ARE SET AND 
AUDIBLE, VENT PLUGGED INTO RED OUTLET. AMBU BAG BEDSIDE. AMBU BAG AT BEDSIDE ,WILL CONTINUE 
TO MONITOR

## 2018-05-06 NOTE — NUR
TELE RN NOTES



PT COMPLAINED OF GTUBE SITE PAIN. PAIN SCORE OF 7/10. GTUBE FEEDING HOLD.  2 NURSE VERIFY GI 
TUBE PLACEMENT, GURGLING SOUND HEARD UPON AUSCULTATION. CALLED TERRIE MORSE. ORDERED KUB WITH 
GASTROGRAFIN ROUTINE. WILL MONITOR PT PAIN CLOSELY.

## 2018-05-06 NOTE — NUR
TELE RN NOTES



RECEIVED PT ON BED. A/O X2. ON TELE MONITOR SINUS TACHY 110. PT FAMILY ON BEDSIDE. ON [facility] 
VENT SETTING SATURATING WELL. ON GTUBE FEEDING, ON RESIDUAL. IV ACCESS ON [facility] SL #[id number]. PATENT 
AND INTACT. HEAD OF BED ELEVATED. SIDE RAILS UP. CALL LIGHT WITHIN REACH. BED ALARM ON.WILL 
CONTINUE TO MONITOR PT CLOSELY.  

-------------------------------------------------------------------------------

Addendum: 05/07/18 at 0142 by BAILEY HERNANDEZ RN

-------------------------------------------------------------------------------

NO RESIDUAL*

## 2018-05-07 VITALS — DIASTOLIC BLOOD PRESSURE: 77 MMHG | SYSTOLIC BLOOD PRESSURE: 117 MMHG

## 2018-05-07 VITALS — SYSTOLIC BLOOD PRESSURE: 131 MMHG | DIASTOLIC BLOOD PRESSURE: 71 MMHG

## 2018-05-07 VITALS — DIASTOLIC BLOOD PRESSURE: 61 MMHG | SYSTOLIC BLOOD PRESSURE: 106 MMHG

## 2018-05-07 VITALS — DIASTOLIC BLOOD PRESSURE: 69 MMHG | SYSTOLIC BLOOD PRESSURE: 123 MMHG

## 2018-05-07 VITALS — DIASTOLIC BLOOD PRESSURE: 62 MMHG | SYSTOLIC BLOOD PRESSURE: 119 MMHG

## 2018-05-07 VITALS — DIASTOLIC BLOOD PRESSURE: 77 MMHG | SYSTOLIC BLOOD PRESSURE: 119 MMHG

## 2018-05-07 LAB
BASOPHILS # BLD AUTO: 0.1 /CMM (ref 0–0.2)
BASOPHILS NFR BLD AUTO: 0.6 % (ref 0–2)
BUN SERPL-MCNC: 16 MG/DL (ref 7–18)
CALCIUM SERPL-MCNC: 9 MG/DL (ref 8.5–10.1)
CHLORIDE SERPL-SCNC: 97 MMOL/L (ref 98–107)
CO2 SERPL-SCNC: 35 MMOL/L (ref 21–32)
CREAT SERPL-MCNC: 0.6 MG/DL (ref 0.6–1.3)
EOSINOPHIL NFR BLD AUTO: 1.4 % (ref 0–6)
GLUCOSE SERPL-MCNC: 81 MG/DL (ref 74–106)
HCT VFR BLD AUTO: 32 % (ref 33–45)
HGB BLD-MCNC: 10 G/DL (ref 11.5–14.8)
LYMPHOCYTES NFR BLD AUTO: 1.8 /CMM (ref 0.8–4.8)
LYMPHOCYTES NFR BLD AUTO: 20.9 % (ref 20–44)
MAGNESIUM SERPL-MCNC: 2 MG/DL (ref 1.8–2.4)
MCHC RBC AUTO-ENTMCNC: 31 G/DL (ref 31–36)
MCV RBC AUTO: 83 FL (ref 82–100)
MONOCYTES NFR BLD AUTO: 0.6 /CMM (ref 0.1–1.3)
MONOCYTES NFR BLD AUTO: 6.6 % (ref 2–12)
NEUTROPHILS # BLD AUTO: 6 /CMM (ref 1.8–8.9)
NEUTROPHILS NFR BLD AUTO: 70.5 % (ref 43–81)
PHOSPHATE SERPL-MCNC: 2.9 MG/DL (ref 2.5–4.9)
PLATELET # BLD AUTO: 247 /CMM (ref 150–450)
POTASSIUM SERPL-SCNC: 4.1 MMOL/L (ref 3.5–5.1)
RBC # BLD AUTO: 3.84 MIL/UL (ref 4–5.2)
RDW COEFFICIENT OF VARIATION: 17.6 (ref 11.5–15)
SODIUM SERPL-SCNC: 138 MMOL/L (ref 136–145)
WBC NRBC COR # BLD AUTO: 8.6 K/UL (ref 4.3–11)

## 2018-05-07 RX ADMIN — SIMETHICONE SCH MG: 20 SUSPENSION/ DROPS ORAL at 21:41

## 2018-05-07 RX ADMIN — Medication SCH MLS/HR: at 12:49

## 2018-05-07 RX ADMIN — LEVALBUTEROL HYDROCHLORIDE SCH MG: 1.25 SOLUTION, CONCENTRATE RESPIRATORY (INHALATION) at 14:49

## 2018-05-07 RX ADMIN — PANTOPRAZOLE SODIUM SCH MG: 40 TABLET, DELAYED RELEASE ORAL at 09:06

## 2018-05-07 RX ADMIN — METOPROLOL TARTRATE SCH MG: 25 TABLET, FILM COATED ORAL at 21:40

## 2018-05-07 RX ADMIN — SIMETHICONE SCH MG: 20 SUSPENSION/ DROPS ORAL at 09:00

## 2018-05-07 RX ADMIN — OXYCODONE HYDROCHLORIDE AND ACETAMINOPHEN SCH MG: 500 TABLET ORAL at 17:50

## 2018-05-07 RX ADMIN — METOPROLOL TARTRATE SCH MG: 25 TABLET, FILM COATED ORAL at 09:07

## 2018-05-07 RX ADMIN — SIMETHICONE SCH MG: 20 SUSPENSION/ DROPS ORAL at 17:52

## 2018-05-07 RX ADMIN — ENOXAPARIN SODIUM SCH MG: 30 INJECTION SUBCUTANEOUS at 09:14

## 2018-05-07 RX ADMIN — Medication PRN EACH: at 12:55

## 2018-05-07 RX ADMIN — LORAZEPAM PRN MG: 1 TABLET ORAL at 17:50

## 2018-05-07 RX ADMIN — Medication SCH MLS/HR: at 23:22

## 2018-05-07 RX ADMIN — Medication SCH EACH: at 17:50

## 2018-05-07 RX ADMIN — DOCUSATE SODIUM SCH MG: 50 LIQUID ORAL at 09:06

## 2018-05-07 RX ADMIN — SIMETHICONE SCH MG: 20 SUSPENSION/ DROPS ORAL at 12:50

## 2018-05-07 RX ADMIN — Medication PRN EACH: at 03:01

## 2018-05-07 RX ADMIN — ATORVASTATIN CALCIUM SCH MG: 10 TABLET, FILM COATED ORAL at 21:38

## 2018-05-07 RX ADMIN — FOLIC ACID SCH MG: 1 TABLET ORAL at 09:07

## 2018-05-07 RX ADMIN — DOCUSATE SODIUM SCH MG: 50 LIQUID ORAL at 17:50

## 2018-05-07 RX ADMIN — RIFAXIMIN SCH MG: 550 TABLET ORAL at 17:50

## 2018-05-07 RX ADMIN — FUROSEMIDE SCH MG: 20 TABLET ORAL at 09:07

## 2018-05-07 RX ADMIN — RIFAXIMIN SCH MG: 550 TABLET ORAL at 15:01

## 2018-05-07 RX ADMIN — LEVALBUTEROL HYDROCHLORIDE SCH MG: 1.25 SOLUTION, CONCENTRATE RESPIRATORY (INHALATION) at 23:42

## 2018-05-07 RX ADMIN — Medication PRN EACH: at 21:38

## 2018-05-07 RX ADMIN — LEVALBUTEROL HYDROCHLORIDE SCH MG: 1.25 SOLUTION, CONCENTRATE RESPIRATORY (INHALATION) at 07:43

## 2018-05-07 RX ADMIN — Medication PRN ML: at 17:59

## 2018-05-07 RX ADMIN — Medication SCH EACH: at 15:01

## 2018-05-07 NOTE — NUR
TELE RN NOTES



PATIENT SEEN BY DR. GRAY GI DOCTOR.

PATIENT SCHEDULED FOR COLONOSCOPY TOMORROW AT 1200. NPO POST MIDNIGHT. 

GOLYTELY TO START AT 10 PM TONIGHT.

CONSENT SIGNED BY PATIENT.

## 2018-05-07 NOTE — NUR
TELE RN CLOSING NOTES



PT IN BED. A/O X2. ON PORTEX 7 TRACH TO MECHANICAL VENT WITH SETTING AS ORDERED. SATTING 
WELL. ABLE TO MOUTH WORDS, BREATHING EVEN AND UNLABORED, TELEMETRY READS SINUS TACH . 
NO SIGNS OF PAIN. LFA G 18 WITH NS AT TKO. SITE CLEAR. ON GTUBE FEEDING, JEVITY 50 ML/HR, O 
RESIDUAL.  NPO POST MIDNIGHT. HEAD OF BED ELEVATED. SIDE RAILS UP. CALL LIGHT WITHIN REACH. 
BED ALARM ON. CALL LIGHT WITHIN REACH. ALL NEEDS MET. PM CARE DONE. NO OTHER SIGNIFICANT 
CHANGE IN CONDITION. WILL ENDORSE TO NEXT SHIFT FOR PHONG.

## 2018-05-07 NOTE — NUR
TELE RN NOTES



NO ACUTE CHANGES NOTED DURING THE SHIFT. DUE MEDS GIVEN. PROVIDED COMFORT AND SAFETY. WILL 
ENDORSE TO THE AM NURSE FOR PHONG.

## 2018-05-07 NOTE — NUR
TELE RN NOTES



SPOKE WITH [facility] RE ZYVOX IV SHCEDULED FOR 1100 NOT AVAILABLE.

## 2018-05-07 NOTE — NUR
TELE RN NOTES



GTUBE FEEDING RESUMED. KUB CAME UP NEGATIVE, GTUBE PLACEMENT INTACT WITH NO EVIDENCE OF 
EXTRAVASATION

## 2018-05-07 NOTE — NUR
RN TELE NOTES,



HD DONE AT THIS TIME WITH 1L REMOVED, BP AT THIS TIME, 95/49, HR 78, PATIENT IN STABLE 
CONDITION, NO S/S OF PAIN OR DISCOMFORT AT THIS TIME, TOLERATED HD PROCESS WELL, WILL 
CONTINUE TO MONITOR CLOSELY.

## 2018-05-07 NOTE — NUR
TELE RN AM NOTES



RECEIVED PT IN BED. A/O X2. ON PORTEX 7 TRACH TO MECHANICAL VENT WITH SETTING AS ORDERED. 
SATTING WELL. ABLE TO MOUTH WORDS, BREATHING EVEN AND UNLABORED, TELEMETRY READS SINUS TACH 
. NO SIGNS OF PAIN. LFA G 18 WITH NS AT TKO. SITE CLEAR. ON GTUBE FEEDING, JEVITY 50 
ML/HR, O RESIDUAL.  HEAD OF BED ELEVATED. SIDE RAILS UP. CALL LIGHT WITHIN REACH. BED ALARM 
ON. CALL IGHT WITHIN REACH. WILL CONTINUE TO MONITOR PT CLOSELY.

## 2018-05-07 NOTE — NUR
RN TELE NOTES,



PATIENT IN BED, ALERT AND ORIENTED X2, ABLE TO MOUTH WORDS AND COMMUNICATE NEEDS AND 
CONCERN, ON MECHANICAL VENTILATOR, TOLERATING SETTINGS WELL,  NO SOB/ACUTE DISTRESS NOTED, 
LFA IV ACCESS INTACT AND PATENT, GTF RUNNING WELL AND PATIENT TOLERATED WELL, DRY AND CLEAN 
AT THIS TIME, ALL NEEDS PROVIDED AND ANTICIPATED, CALL LIGHT W/I REACH, WILL CONTINUE TO 
MONITOR CLOSELY.

## 2018-05-08 VITALS — SYSTOLIC BLOOD PRESSURE: 124 MMHG | DIASTOLIC BLOOD PRESSURE: 76 MMHG

## 2018-05-08 VITALS — SYSTOLIC BLOOD PRESSURE: 117 MMHG | DIASTOLIC BLOOD PRESSURE: 69 MMHG

## 2018-05-08 VITALS — DIASTOLIC BLOOD PRESSURE: 79 MMHG | SYSTOLIC BLOOD PRESSURE: 120 MMHG

## 2018-05-08 VITALS — DIASTOLIC BLOOD PRESSURE: 71 MMHG | SYSTOLIC BLOOD PRESSURE: 143 MMHG

## 2018-05-08 VITALS — DIASTOLIC BLOOD PRESSURE: 83 MMHG | SYSTOLIC BLOOD PRESSURE: 144 MMHG

## 2018-05-08 VITALS — SYSTOLIC BLOOD PRESSURE: 116 MMHG | DIASTOLIC BLOOD PRESSURE: 75 MMHG

## 2018-05-08 LAB
BASE EXCESS BLDA CALC-SCNC: 10.1 MMOL/L
BASOPHILS # BLD AUTO: 0 /CMM (ref 0–0.2)
BASOPHILS NFR BLD AUTO: 0.5 % (ref 0–2)
BUN SERPL-MCNC: 12 MG/DL (ref 7–18)
CALCIUM SERPL-MCNC: 8.9 MG/DL (ref 8.5–10.1)
CHLORIDE SERPL-SCNC: 98 MMOL/L (ref 98–107)
CO2 SERPL-SCNC: 35 MMOL/L (ref 21–32)
CREAT SERPL-MCNC: 0.6 MG/DL (ref 0.6–1.3)
DO-HGB MFR BLDA: 78.9 MMHG
EOSINOPHIL NFR BLD AUTO: 1.1 % (ref 0–6)
GLUCOSE SERPL-MCNC: 87 MG/DL (ref 74–106)
HCT VFR BLD AUTO: 32 % (ref 33–45)
HGB BLD-MCNC: 9.9 G/DL (ref 11.5–14.8)
INHALED O2 CONCENTRATION: 35 %
INTRINSIC PEEP RESPIRATORY: 0 CM H2O
LYMPHOCYTES NFR BLD AUTO: 1.3 /CMM (ref 0.8–4.8)
LYMPHOCYTES NFR BLD AUTO: 14.1 % (ref 20–44)
MAGNESIUM SERPL-MCNC: 1.7 MG/DL (ref 1.8–2.4)
MCHC RBC AUTO-ENTMCNC: 31 G/DL (ref 31–36)
MCV RBC AUTO: 83 FL (ref 82–100)
MONOCYTES NFR BLD AUTO: 0.5 /CMM (ref 0.1–1.3)
MONOCYTES NFR BLD AUTO: 5.5 % (ref 2–12)
NEUTROPHILS # BLD AUTO: 7.3 /CMM (ref 1.8–8.9)
NEUTROPHILS NFR BLD AUTO: 78.8 % (ref 43–81)
PCO2 TEMP ADJ BLDA: 52.1 MMHG (ref 35–45)
PH TEMP ADJ BLDA: 7.45 [PH] (ref 7.35–7.45)
PHOSPHATE SERPL-MCNC: 3.4 MG/DL (ref 2.5–4.9)
PLATELET # BLD AUTO: 271 /CMM (ref 150–450)
PO2 TEMP ADJ BLDA: 110.1 MMHG (ref 75–100)
POTASSIUM SERPL-SCNC: 3.8 MMOL/L (ref 3.5–5.1)
RBC # BLD AUTO: 3.81 MIL/UL (ref 4–5.2)
RDW COEFFICIENT OF VARIATION: 17.7 (ref 11.5–15)
SAO2 % BLDA: 97.6 % (ref 92–98.5)
SODIUM SERPL-SCNC: 137 MMOL/L (ref 136–145)
VENTILATION MODE VENT: (no result)
WBC NRBC COR # BLD AUTO: 9.3 K/UL (ref 4.3–11)

## 2018-05-08 PROCEDURE — 0DBM8ZX EXCISION OF DESCENDING COLON, VIA NATURAL OR ARTIFICIAL OPENING ENDOSCOPIC, DIAGNOSTIC: ICD-10-PCS | Performed by: INTERNAL MEDICINE

## 2018-05-08 PROCEDURE — 0DBL8ZX EXCISION OF TRANSVERSE COLON, VIA NATURAL OR ARTIFICIAL OPENING ENDOSCOPIC, DIAGNOSTIC: ICD-10-PCS | Performed by: INTERNAL MEDICINE

## 2018-05-08 RX ADMIN — Medication SCH EACH: at 16:18

## 2018-05-08 RX ADMIN — SIMETHICONE SCH MG: 20 SUSPENSION/ DROPS ORAL at 13:00

## 2018-05-08 RX ADMIN — RIFAXIMIN SCH MG: 550 TABLET ORAL at 16:18

## 2018-05-08 RX ADMIN — Medication SCH EACH: at 08:25

## 2018-05-08 RX ADMIN — LEVALBUTEROL HYDROCHLORIDE SCH MG: 1.25 SOLUTION, CONCENTRATE RESPIRATORY (INHALATION) at 15:14

## 2018-05-08 RX ADMIN — DOCUSATE SODIUM SCH MG: 50 LIQUID ORAL at 08:23

## 2018-05-08 RX ADMIN — LEVALBUTEROL HYDROCHLORIDE SCH MG: 1.25 SOLUTION, CONCENTRATE RESPIRATORY (INHALATION) at 23:00

## 2018-05-08 RX ADMIN — Medication SCH MLS/HR: at 22:52

## 2018-05-08 RX ADMIN — FUROSEMIDE SCH MG: 20 TABLET ORAL at 08:23

## 2018-05-08 RX ADMIN — METOPROLOL TARTRATE SCH MG: 25 TABLET, FILM COATED ORAL at 08:24

## 2018-05-08 RX ADMIN — MAGNESIUM SULFATE IN DEXTROSE SCH MLS/HR: 10 INJECTION, SOLUTION INTRAVENOUS at 13:18

## 2018-05-08 RX ADMIN — PANTOPRAZOLE SODIUM SCH MG: 40 TABLET, DELAYED RELEASE ORAL at 07:30

## 2018-05-08 RX ADMIN — SIMETHICONE SCH MG: 20 SUSPENSION/ DROPS ORAL at 08:24

## 2018-05-08 RX ADMIN — FOLIC ACID SCH MG: 1 TABLET ORAL at 08:23

## 2018-05-08 RX ADMIN — LEVALBUTEROL HYDROCHLORIDE SCH MG: 1.25 SOLUTION, CONCENTRATE RESPIRATORY (INHALATION) at 07:48

## 2018-05-08 RX ADMIN — LORAZEPAM PRN MG: 1 TABLET ORAL at 22:44

## 2018-05-08 RX ADMIN — DOCUSATE SODIUM SCH MG: 50 LIQUID ORAL at 16:18

## 2018-05-08 RX ADMIN — RIFAXIMIN SCH MG: 550 TABLET ORAL at 08:25

## 2018-05-08 RX ADMIN — OXYCODONE HYDROCHLORIDE AND ACETAMINOPHEN SCH MG: 500 TABLET ORAL at 16:18

## 2018-05-08 RX ADMIN — METOPROLOL TARTRATE SCH MG: 25 TABLET, FILM COATED ORAL at 20:07

## 2018-05-08 RX ADMIN — ATORVASTATIN CALCIUM SCH MG: 10 TABLET, FILM COATED ORAL at 21:10

## 2018-05-08 RX ADMIN — DEXTROSE MONOHYDRATE PRN MG: 50 INJECTION, SOLUTION INTRAVENOUS at 01:29

## 2018-05-08 RX ADMIN — MAGNESIUM SULFATE IN DEXTROSE SCH MLS/HR: 10 INJECTION, SOLUTION INTRAVENOUS at 11:20

## 2018-05-08 RX ADMIN — SIMETHICONE SCH MG: 20 SUSPENSION/ DROPS ORAL at 20:08

## 2018-05-08 RX ADMIN — SIMETHICONE SCH MG: 20 SUSPENSION/ DROPS ORAL at 16:19

## 2018-05-08 RX ADMIN — Medication PRN ML: at 14:33

## 2018-05-08 RX ADMIN — ENOXAPARIN SODIUM SCH MG: 30 INJECTION SUBCUTANEOUS at 08:25

## 2018-05-08 RX ADMIN — Medication SCH MLS/HR: at 11:22

## 2018-05-08 NOTE — NUR
PT RECEIVED TRACH PTX 7 ON VENT. PT IS AWAKE AND ALERT NO RESP DISTRESS NOTED. SX'D FOR MOD 
AMT OF THICK YELLOW SECRETIONS. VENT ALARMS SET AND AUDIBLE. AMBU BAG AT BEDSIDE. VENT 
PLUGGED INTO RED OUTLET. WILL CONTINUE TO MONITOR.


-------------------------------------------------------------------------------

Addendum: 05/08/18 at 2128 by BAILEY ROMERO RT

-------------------------------------------------------------------------------

Amended: Links added.

## 2018-05-08 NOTE — NUR
N TELE NOTES,



PATIENT IN BED, ALERT AND ORIENTED X2, ABLE TO MOUTH WORDS AND COMMUNICATE NEEDS AND 
CONCERN, ON MECHANICAL VENTILATOR, TOLERATING SETTINGS WELL,  NO SOB/ACUTE DISTRESS NOTED, 
LFA IV ACCESS INTACT AND PATENT, GTF RUNNING WELL AND PATIENT TOLERATED WELL, DRY AND CLEAN 
AT THIS TIME, GOLYTELY ADMINISTER AS ORDERED, AND NPO FOR COLONOSCOPY THIS MORNING, NO 
SIGNIFICANT CHANGE OF CONDITION THROUGHOUT THE SHIFT,  ALL NEEDS PROVIDED AND ANTICIPATED, 
CALL LIGHT W/I REACH, WILL ENDORSE CONTINUITY OF CARE TO ON COMING SHIFT.

## 2018-05-08 NOTE — NUR
RN TELE NOTES,



PATIENT IN BED, ALERT AND ORIENTED X2, ABLE TO MOUTH WORDS AND COMMUNICATE NEEDS AND 
CONCERN, ON MECHANICAL VENTILATOR, TOLERATING SETTINGS WELL,  NO SOB/ACUTE DISTRESS NOTED, 
LFA IV ACCESS INTACT AND PATENT, GTF RUNNING WELL AND PATIENT TOLERATED WELL, DRY AND CLEAN 
AT THIS TIME, ALL NEEDS PROVIDED AND ANTICIPATED, S/P COLONOSCOPY AND BIOPSY, NO C/O 
DISCOMFORT , STILL C/O ABDOMINAL PAIN, WILL ADMINISTER MEDS AS ORDERED, CALL LIGHT W/I 
REACH, WILL CONTINUE TO MONITOR CLOSELY.

## 2018-05-08 NOTE — NUR
TELE RN NOTES



MAGNESIUM IV BAG #2 STARTED. UNABLE TO GIVE AS SCHEDULED DUE TO ONGOING COLONOSCOPY.

## 2018-05-08 NOTE — NUR
TELE RN AM NOTES



RECEIVED PT IN BED. A/O X2. ON PORTEX 7 TRACH TO MECHANICAL VENT WITH SETTING AS ORDERED. 
SATTING 99%. ABLE TO MOUTH WORDS, BREATHING EVEN AND UNLABORED, TELEMETRY READS AFIB HR 90. 
NO SIGNS OF PAIN. LFA G 18 WITH NS AT TKO. SITE CLEAR. GTUBE CLAMP, NPO FOR SCHEDULED 
COLONOSCOPY. HEAD OF BED ELEVATED. SIDE RAILS UP. CALL LIGHT WITHIN REACH. BED ALARM ON. 
CALL LIGHT WITHIN REACH. WILL CONTINUE TO MONITOR PT CLOSELY.

## 2018-05-08 NOTE — NUR
TELE RN CLOSING NOTES



PT IN BED. A/O X2. S/P COLONOSCOPY WITH BIOPSY TODAY BY DR. GRAY. ON PORTEX 7 TRACH TO 
MECHANICAL VENT WITH SETTING AS ORDERED. SATTING WELL. ABLE TO MOUTH WORDS, BREATHING EVEN 
AND UNLABORED, TELEMETRY READS SINUS TACH . NO SIGNS OF PAIN. LFA G 18 WITH NS AT TKO. 
SITE CLEAR. ON GTUBE FEEDING, JEVITY 50 ML/HR, O RESIDUAL.  HEAD OF BED ELEVATED. SIDE RAILS 
UP. CALL LIGHT WITHIN REACH. BED ALARM ON. CALL LIGHT WITHIN REACH. ALL NEEDS MET. PM CARE 
DONE. NO OTHER SIGNIFICANT CHANGE IN CONDITION. WILL ENDORSE TO NEXT SHIFT FOR PHONG.

## 2018-05-09 VITALS — DIASTOLIC BLOOD PRESSURE: 70 MMHG | SYSTOLIC BLOOD PRESSURE: 122 MMHG

## 2018-05-09 VITALS — SYSTOLIC BLOOD PRESSURE: 116 MMHG | DIASTOLIC BLOOD PRESSURE: 73 MMHG

## 2018-05-09 VITALS — SYSTOLIC BLOOD PRESSURE: 116 MMHG | DIASTOLIC BLOOD PRESSURE: 69 MMHG

## 2018-05-09 VITALS — DIASTOLIC BLOOD PRESSURE: 70 MMHG | SYSTOLIC BLOOD PRESSURE: 124 MMHG

## 2018-05-09 VITALS — DIASTOLIC BLOOD PRESSURE: 57 MMHG | SYSTOLIC BLOOD PRESSURE: 112 MMHG

## 2018-05-09 VITALS — SYSTOLIC BLOOD PRESSURE: 94 MMHG | DIASTOLIC BLOOD PRESSURE: 58 MMHG

## 2018-05-09 LAB
BUN SERPL-MCNC: 12 MG/DL (ref 7–18)
CALCIUM SERPL-MCNC: 9.1 MG/DL (ref 8.5–10.1)
CHLORIDE SERPL-SCNC: 99 MMOL/L (ref 98–107)
CO2 SERPL-SCNC: 33 MMOL/L (ref 21–32)
CREAT SERPL-MCNC: 0.6 MG/DL (ref 0.6–1.3)
GLUCOSE SERPL-MCNC: 89 MG/DL (ref 74–106)
MAGNESIUM SERPL-MCNC: 2.2 MG/DL (ref 1.8–2.4)
POTASSIUM SERPL-SCNC: 4.2 MMOL/L (ref 3.5–5.1)
SODIUM SERPL-SCNC: 136 MMOL/L (ref 136–145)

## 2018-05-09 RX ADMIN — SIMETHICONE SCH MG: 20 SUSPENSION/ DROPS ORAL at 21:03

## 2018-05-09 RX ADMIN — LINEZOLID SCH MG: 600 TABLET, FILM COATED ORAL at 21:02

## 2018-05-09 RX ADMIN — METOPROLOL TARTRATE SCH MG: 25 TABLET, FILM COATED ORAL at 09:03

## 2018-05-09 RX ADMIN — ENOXAPARIN SODIUM SCH MG: 30 INJECTION SUBCUTANEOUS at 09:05

## 2018-05-09 RX ADMIN — METOPROLOL TARTRATE SCH MG: 25 TABLET, FILM COATED ORAL at 21:02

## 2018-05-09 RX ADMIN — SIMETHICONE SCH MG: 20 SUSPENSION/ DROPS ORAL at 12:13

## 2018-05-09 RX ADMIN — PANTOPRAZOLE SODIUM SCH MG: 40 TABLET, DELAYED RELEASE ORAL at 09:02

## 2018-05-09 RX ADMIN — LEVALBUTEROL HYDROCHLORIDE SCH MG: 1.25 SOLUTION, CONCENTRATE RESPIRATORY (INHALATION) at 07:23

## 2018-05-09 RX ADMIN — SIMETHICONE SCH MG: 20 SUSPENSION/ DROPS ORAL at 09:04

## 2018-05-09 RX ADMIN — Medication PRN EACH: at 09:01

## 2018-05-09 RX ADMIN — RIFAXIMIN SCH MG: 550 TABLET ORAL at 18:14

## 2018-05-09 RX ADMIN — ACETAMINOPHEN PRN MG: 325 TABLET ORAL at 21:03

## 2018-05-09 RX ADMIN — LEVALBUTEROL HYDROCHLORIDE SCH MG: 1.25 SOLUTION, CONCENTRATE RESPIRATORY (INHALATION) at 15:40

## 2018-05-09 RX ADMIN — Medication PRN ML: at 18:25

## 2018-05-09 RX ADMIN — SIMETHICONE SCH MG: 20 SUSPENSION/ DROPS ORAL at 18:13

## 2018-05-09 RX ADMIN — ZOLPIDEM TARTRATE PRN MG: 5 TABLET, FILM COATED ORAL at 00:50

## 2018-05-09 RX ADMIN — DOCUSATE SODIUM SCH MG: 50 LIQUID ORAL at 09:00

## 2018-05-09 RX ADMIN — OXYCODONE HYDROCHLORIDE AND ACETAMINOPHEN SCH MG: 500 TABLET ORAL at 18:14

## 2018-05-09 RX ADMIN — Medication SCH EACH: at 18:14

## 2018-05-09 RX ADMIN — Medication PRN EACH: at 22:19

## 2018-05-09 RX ADMIN — RIFAXIMIN SCH MG: 550 TABLET ORAL at 09:03

## 2018-05-09 RX ADMIN — Medication SCH EACH: at 09:02

## 2018-05-09 RX ADMIN — LORAZEPAM PRN MG: 1 TABLET ORAL at 16:01

## 2018-05-09 RX ADMIN — FOLIC ACID SCH MG: 1 TABLET ORAL at 09:03

## 2018-05-09 RX ADMIN — FUROSEMIDE SCH MG: 20 TABLET ORAL at 09:05

## 2018-05-09 RX ADMIN — Medication PRN EACH: at 18:15

## 2018-05-09 RX ADMIN — DOCUSATE SODIUM SCH MG: 50 LIQUID ORAL at 18:13

## 2018-05-09 RX ADMIN — DEXTROSE MONOHYDRATE PRN MG: 50 INJECTION, SOLUTION INTRAVENOUS at 12:13

## 2018-05-09 RX ADMIN — ATORVASTATIN CALCIUM SCH MG: 10 TABLET, FILM COATED ORAL at 21:04

## 2018-05-09 RX ADMIN — LEVALBUTEROL HYDROCHLORIDE SCH MG: 1.25 SOLUTION, CONCENTRATE RESPIRATORY (INHALATION) at 23:20

## 2018-05-09 NOTE — NUR
RN TELE NOTES,



PATIENT IN BED, SLEEPING BUT EASILY AROUSABLE,  ABLE TO MOUTH WORDS AND COMMUNICATE NEEDS 
AND CONCERN, ON MECHANICAL VENTILATOR, TOLERATING SETTINGS WELL,  NO SOB/ACUTE DISTRESS 
NOTED, LFA IV ACCESS INTACT AND PATENT, GTF RUNNING WELL AND PATIENT TOLERATED WELL, DRY AND 
CLEAN AT THIS TIME, AND WELL REPOSITIONED, ALL NEEDS PROVIDED AND ANTICIPATED, NO C/O PAIN 
OR  DISCOMFORT , , CALL LIGHT W/I REACH, WILL ENDORSE CONTINUITY OF CARE TO ONCOMING NURSE.

## 2018-05-09 NOTE — NUR
RT NOTE:



RECEIVED TRACH PT ON VENT ON NOTED SETTINGS. TRACH SIZE PORTEX 7. TRACH IS SECURED AND 
PATENT. MLT DONE. PT IS AWAKE AND ALERT. NO RESP DISTRESS NOTED. SX'D FOR MOD AMT OF THICK 
WHITE/YELLOW SECRETIONS. VENT ALARMS SET AND AUDIBLE. AMBU BAG AT BEDSIDE. VENT PLUGGED INTO 
RED OUTLET. Q8 BREATHING TX GIVEN AS MD ORDERED. WILL CONTINUE TO MONITOR.

## 2018-05-10 VITALS — DIASTOLIC BLOOD PRESSURE: 71 MMHG | SYSTOLIC BLOOD PRESSURE: 126 MMHG

## 2018-05-10 VITALS — SYSTOLIC BLOOD PRESSURE: 116 MMHG | DIASTOLIC BLOOD PRESSURE: 78 MMHG

## 2018-05-10 VITALS — SYSTOLIC BLOOD PRESSURE: 93 MMHG | DIASTOLIC BLOOD PRESSURE: 63 MMHG

## 2018-05-10 VITALS — DIASTOLIC BLOOD PRESSURE: 54 MMHG | SYSTOLIC BLOOD PRESSURE: 108 MMHG

## 2018-05-10 VITALS — DIASTOLIC BLOOD PRESSURE: 58 MMHG | SYSTOLIC BLOOD PRESSURE: 139 MMHG

## 2018-05-10 VITALS — SYSTOLIC BLOOD PRESSURE: 101 MMHG | DIASTOLIC BLOOD PRESSURE: 58 MMHG

## 2018-05-10 LAB
ALBUMIN SERPL BCP-MCNC: 2.6 G/DL (ref 3.4–5)
ALP SERPL-CCNC: 285 U/L (ref 46–116)
ALT SERPL W P-5'-P-CCNC: 35 U/L (ref 12–78)
AST SERPL W P-5'-P-CCNC: 26 U/L (ref 15–37)
BASOPHILS # BLD AUTO: 0.1 /CMM (ref 0–0.2)
BASOPHILS NFR BLD AUTO: 0.7 % (ref 0–2)
BILIRUB SERPL-MCNC: 0.4 MG/DL (ref 0.2–1)
BUN SERPL-MCNC: 14 MG/DL (ref 7–18)
CALCIUM SERPL-MCNC: 8.9 MG/DL (ref 8.5–10.1)
CHLORIDE SERPL-SCNC: 100 MMOL/L (ref 98–107)
CO2 SERPL-SCNC: 34 MMOL/L (ref 21–32)
CREAT SERPL-MCNC: 0.6 MG/DL (ref 0.6–1.3)
EOSINOPHIL NFR BLD AUTO: 1.8 % (ref 0–6)
GLUCOSE SERPL-MCNC: 99 MG/DL (ref 74–106)
HCT VFR BLD AUTO: 31 % (ref 33–45)
HGB BLD-MCNC: 9.7 G/DL (ref 11.5–14.8)
LYMPHOCYTES NFR BLD AUTO: 1.4 /CMM (ref 0.8–4.8)
LYMPHOCYTES NFR BLD AUTO: 16.1 % (ref 20–44)
MCHC RBC AUTO-ENTMCNC: 31 G/DL (ref 31–36)
MCV RBC AUTO: 84 FL (ref 82–100)
MONOCYTES NFR BLD AUTO: 0.6 /CMM (ref 0.1–1.3)
MONOCYTES NFR BLD AUTO: 6.3 % (ref 2–12)
NEUTROPHILS # BLD AUTO: 6.7 /CMM (ref 1.8–8.9)
NEUTROPHILS NFR BLD AUTO: 75.1 % (ref 43–81)
PLATELET # BLD AUTO: 244 /CMM (ref 150–450)
POTASSIUM SERPL-SCNC: 4.6 MMOL/L (ref 3.5–5.1)
PROT SERPL-MCNC: 7.5 G/DL (ref 6.4–8.2)
RBC # BLD AUTO: 3.73 MIL/UL (ref 4–5.2)
RDW COEFFICIENT OF VARIATION: 18 (ref 11.5–15)
SODIUM SERPL-SCNC: 135 MMOL/L (ref 136–145)
WBC NRBC COR # BLD AUTO: 8.9 K/UL (ref 4.3–11)

## 2018-05-10 RX ADMIN — ATORVASTATIN CALCIUM SCH MG: 10 TABLET, FILM COATED ORAL at 21:04

## 2018-05-10 RX ADMIN — LEVALBUTEROL HYDROCHLORIDE SCH MG: 1.25 SOLUTION, CONCENTRATE RESPIRATORY (INHALATION) at 08:07

## 2018-05-10 RX ADMIN — METOPROLOL TARTRATE SCH MG: 25 TABLET, FILM COATED ORAL at 09:23

## 2018-05-10 RX ADMIN — DOCUSATE SODIUM SCH MG: 50 LIQUID ORAL at 17:00

## 2018-05-10 RX ADMIN — Medication SCH EACH: at 17:13

## 2018-05-10 RX ADMIN — SIMETHICONE SCH MG: 20 SUSPENSION/ DROPS ORAL at 21:04

## 2018-05-10 RX ADMIN — RIFAXIMIN SCH MG: 550 TABLET ORAL at 08:41

## 2018-05-10 RX ADMIN — SIMETHICONE SCH MG: 20 SUSPENSION/ DROPS ORAL at 17:13

## 2018-05-10 RX ADMIN — RIFAXIMIN SCH MG: 550 TABLET ORAL at 17:13

## 2018-05-10 RX ADMIN — LINEZOLID SCH MG: 600 TABLET, FILM COATED ORAL at 21:04

## 2018-05-10 RX ADMIN — Medication PRN OZ: at 22:59

## 2018-05-10 RX ADMIN — METOPROLOL TARTRATE SCH MG: 25 TABLET, FILM COATED ORAL at 21:03

## 2018-05-10 RX ADMIN — FOLIC ACID SCH MG: 1 TABLET ORAL at 08:40

## 2018-05-10 RX ADMIN — Medication PRN EACH: at 08:43

## 2018-05-10 RX ADMIN — FUROSEMIDE SCH MG: 20 TABLET ORAL at 09:23

## 2018-05-10 RX ADMIN — SIMETHICONE SCH MG: 20 SUSPENSION/ DROPS ORAL at 08:41

## 2018-05-10 RX ADMIN — SIMETHICONE SCH MG: 20 SUSPENSION/ DROPS ORAL at 14:28

## 2018-05-10 RX ADMIN — ENOXAPARIN SODIUM SCH MG: 30 INJECTION SUBCUTANEOUS at 08:40

## 2018-05-10 RX ADMIN — ACETAMINOPHEN PRN MG: 325 TABLET ORAL at 15:52

## 2018-05-10 RX ADMIN — DOCUSATE SODIUM SCH MG: 50 LIQUID ORAL at 09:00

## 2018-05-10 RX ADMIN — LEVALBUTEROL HYDROCHLORIDE SCH MG: 1.25 SOLUTION, CONCENTRATE RESPIRATORY (INHALATION) at 23:02

## 2018-05-10 RX ADMIN — PANTOPRAZOLE SODIUM SCH MG: 40 TABLET, DELAYED RELEASE ORAL at 08:38

## 2018-05-10 RX ADMIN — Medication SCH EACH: at 08:39

## 2018-05-10 RX ADMIN — Medication PRN EACH: at 14:29

## 2018-05-10 RX ADMIN — OXYCODONE HYDROCHLORIDE AND ACETAMINOPHEN SCH MG: 500 TABLET ORAL at 17:13

## 2018-05-10 RX ADMIN — LINEZOLID SCH MG: 600 TABLET, FILM COATED ORAL at 08:40

## 2018-05-10 RX ADMIN — Medication PRN ML: at 14:40

## 2018-05-10 RX ADMIN — LORAZEPAM PRN MG: 1 TABLET ORAL at 22:56

## 2018-05-10 RX ADMIN — Medication PRN EACH: at 22:08

## 2018-05-10 RX ADMIN — LEVALBUTEROL HYDROCHLORIDE SCH MG: 1.25 SOLUTION, CONCENTRATE RESPIRATORY (INHALATION) at 15:16

## 2018-05-10 RX ADMIN — Medication PRN EACH: at 03:10

## 2018-05-10 NOTE — NUR
TELE RN NOTE:



SEEN AND FOLLOWED UP BY THE SPEECH THERAPIST AND ACCORDING TO HER, THE PATIENT IS NOT SAFE 
FOR SWALLOWING THIN LIQUID RIGHT NOW. NO ORAL GRATIFICATION WILL BE GIVEN AT THIS TIME. DR. RODRIGUEZ MADE AWARE.

## 2018-05-10 NOTE — NUR
TELE RN NOTE



PT REMAINED STABLE DURING SHIFT. NO ACUTE DISTRESS NOTED. ALL NEEDS ATTENDED TO PROMPTLY. 
CALL LIGHT WITHIN REACH. WILL ENDORSE TO NEXT SHIFT FOR CONTINUITY OF CARE.

## 2018-05-10 NOTE — NUR
TELE RN NOTE:



RECEIVED PATIENT IN BED, AWAKE, ALERT AND ABLE TO STATE HER NEEDS. VENT-TRACH DEPENDENT 
SATURATING 100%. ON CARDIAC MONITOR, A. FIB HR= 77. HOB ELEVATED. ON GT FEEDING OF JEVITY 
1.2 @50CC/HR TOLERATING WELL. (L) FA IV LINE NOTED INTACT AND PATENT. BED LOCKED AND ALARMED 
AT ALL TIMES. ON CONTACT ISOLATION FOR VRE URINE. CALL LIGHT WITHIN REACH. NEEDS 
ANTICIPATED.

## 2018-05-10 NOTE — NUR
TELE RN NOTE:



PATIENT IN BED, AWAKE, ALERT AND ABLE TO STATE HER NEEDS. VENT-TRACH DEPENDENT SATURATING 
100%. ON CARDIAC MONITOR, A. FIB HR= 75. HOB ELEVATED. ON GT FEEDING OF JEVITY 1.2 @50CC/HR 
TOLERATING WELL. (L) FA IV LINE NOTED INTACT AND PATENT. BED LOCKED AND ALARMED AT ALL 
TIMES. ON CONTACT ISOLATION FOR VRE URINE. CALL LIGHT WITHIN REACH. REPORT GIVEN TO PM SHIFT 
NURSE FOR CONTINUITY OF CARE.

## 2018-05-10 NOTE — NUR
TELE RN NOTES

RECEIVED ON BED A/O X3,ABLE TO MUMBLE HER NEEDS,ON T/V,SETTINGS TOLERATED WELL.O2 SAT 
100%.AFIB 106 ON TELE MONITOR.SALINE LOCK LFA INTACT AND PATENT.ON GT FEEDING OF JEVITY 1.2 
AT 50ML/HR RATE CONTINUOSLY.WITH 5ML RESIDUAL VOLUME NOTED.ISOLATION PRECAUTION FOR VRE 
URINE.VISITORS AT BEDSIDE.CALL LIGHT IN REACH,NEEDS ANTICIPATED.

## 2018-05-11 VITALS — DIASTOLIC BLOOD PRESSURE: 69 MMHG | SYSTOLIC BLOOD PRESSURE: 126 MMHG

## 2018-05-11 VITALS — SYSTOLIC BLOOD PRESSURE: 118 MMHG | DIASTOLIC BLOOD PRESSURE: 68 MMHG

## 2018-05-11 VITALS — DIASTOLIC BLOOD PRESSURE: 78 MMHG | SYSTOLIC BLOOD PRESSURE: 116 MMHG

## 2018-05-11 VITALS — DIASTOLIC BLOOD PRESSURE: 64 MMHG | SYSTOLIC BLOOD PRESSURE: 105 MMHG

## 2018-05-11 VITALS — DIASTOLIC BLOOD PRESSURE: 68 MMHG | SYSTOLIC BLOOD PRESSURE: 118 MMHG

## 2018-05-11 VITALS — SYSTOLIC BLOOD PRESSURE: 119 MMHG | DIASTOLIC BLOOD PRESSURE: 54 MMHG

## 2018-05-11 RX ADMIN — LEVALBUTEROL HYDROCHLORIDE SCH MG: 1.25 SOLUTION, CONCENTRATE RESPIRATORY (INHALATION) at 15:15

## 2018-05-11 RX ADMIN — RIFAXIMIN SCH MG: 550 TABLET ORAL at 08:39

## 2018-05-11 RX ADMIN — SIMETHICONE SCH MG: 20 SUSPENSION/ DROPS ORAL at 17:34

## 2018-05-11 RX ADMIN — Medication PRN EACH: at 17:34

## 2018-05-11 RX ADMIN — LINEZOLID SCH MG: 600 TABLET, FILM COATED ORAL at 21:00

## 2018-05-11 RX ADMIN — Medication PRN EACH: at 03:54

## 2018-05-11 RX ADMIN — PANTOPRAZOLE SODIUM SCH MG: 40 TABLET, DELAYED RELEASE ORAL at 08:38

## 2018-05-11 RX ADMIN — RIFAXIMIN SCH MG: 550 TABLET ORAL at 17:33

## 2018-05-11 RX ADMIN — DOCUSATE SODIUM SCH MG: 50 LIQUID ORAL at 17:34

## 2018-05-11 RX ADMIN — ZOLPIDEM TARTRATE PRN MG: 5 TABLET, FILM COATED ORAL at 21:05

## 2018-05-11 RX ADMIN — Medication PRN EACH: at 09:21

## 2018-05-11 RX ADMIN — LEVALBUTEROL HYDROCHLORIDE SCH MG: 1.25 SOLUTION, CONCENTRATE RESPIRATORY (INHALATION) at 23:36

## 2018-05-11 RX ADMIN — ZOLPIDEM TARTRATE PRN MG: 5 TABLET, FILM COATED ORAL at 01:38

## 2018-05-11 RX ADMIN — FOLIC ACID SCH MG: 1 TABLET ORAL at 08:39

## 2018-05-11 RX ADMIN — ENOXAPARIN SODIUM SCH MG: 30 INJECTION SUBCUTANEOUS at 08:47

## 2018-05-11 RX ADMIN — SIMETHICONE SCH MG: 20 SUSPENSION/ DROPS ORAL at 21:02

## 2018-05-11 RX ADMIN — LEVALBUTEROL HYDROCHLORIDE SCH MG: 1.25 SOLUTION, CONCENTRATE RESPIRATORY (INHALATION) at 08:12

## 2018-05-11 RX ADMIN — ATORVASTATIN CALCIUM SCH MG: 10 TABLET, FILM COATED ORAL at 21:00

## 2018-05-11 RX ADMIN — FUROSEMIDE SCH MG: 20 TABLET ORAL at 08:38

## 2018-05-11 RX ADMIN — METOPROLOL TARTRATE SCH MG: 25 TABLET, FILM COATED ORAL at 21:02

## 2018-05-11 RX ADMIN — Medication SCH EACH: at 08:39

## 2018-05-11 RX ADMIN — METOPROLOL TARTRATE SCH MG: 25 TABLET, FILM COATED ORAL at 08:39

## 2018-05-11 RX ADMIN — Medication SCH EACH: at 17:34

## 2018-05-11 RX ADMIN — SIMETHICONE SCH MG: 20 SUSPENSION/ DROPS ORAL at 12:34

## 2018-05-11 RX ADMIN — OXYCODONE HYDROCHLORIDE AND ACETAMINOPHEN SCH MG: 500 TABLET ORAL at 17:34

## 2018-05-11 RX ADMIN — SIMETHICONE SCH MG: 20 SUSPENSION/ DROPS ORAL at 08:41

## 2018-05-11 RX ADMIN — Medication PRN EACH: at 21:01

## 2018-05-11 RX ADMIN — LINEZOLID SCH MG: 600 TABLET, FILM COATED ORAL at 08:39

## 2018-05-11 RX ADMIN — DOCUSATE SODIUM SCH MG: 50 LIQUID ORAL at 08:36

## 2018-05-11 NOTE — NUR
TELE RN OPENING NOTES

RECEIVED PATIENT IN STABLE CONDITION. IN NO APPARENT DISTRESS. BEDSIDE RAILS ARE UPX2. BED 
IS LOCKED AND LOWERED. CALL LIGHT IS WITHIN REACH. WILL CONTINUE TO MONITOR.

## 2018-05-11 NOTE — NUR
TELE RN NOTES

REFUSED TO RE START GT FEEDING.CALM AT THIS TIME.IN NO ACUTE DISTRESS.WILL ENDORSE TO DAY 
NURSE FOR PHONG.

## 2018-05-11 NOTE — NUR
PT TRACHD ON MECHANICAL VENT. TXS GIVEN AND NO ADVERSE REACTION NOTED. SX PT T/O SHIFT. PT 
TRACH PATENT AND SECURE. AMBU BAG AT BEDSIDE. VENT PLUGGED INTO RED OUTLET. ALARMS ARE ON 
AND AUDIBLE.

-------------------------------------------------------------------------------

Addendum: 05/11/18 at 1428 by JOSE F VELÁSQUEZ RT

-------------------------------------------------------------------------------

Amended: Links added.

## 2018-05-11 NOTE — NUR
TELE RN CLOSING NOTES

PATIENT IN STABLE CONDITION. IN NO APPARENT DISTRESS. BEDSIDE RAILS ARE UPX2. BED IS LOCKED 
AND LOWERED. IV LINE IS INTACT AND PATENT. ALL NEEDS WERE MET. CALL LIGHT IS WITHIN REACH. 
WILL ENDORSE CARE TO NIGHT SHIFT NURSE FOR PHONG.

## 2018-05-12 VITALS — SYSTOLIC BLOOD PRESSURE: 115 MMHG | DIASTOLIC BLOOD PRESSURE: 77 MMHG

## 2018-05-12 VITALS — DIASTOLIC BLOOD PRESSURE: 80 MMHG | SYSTOLIC BLOOD PRESSURE: 115 MMHG

## 2018-05-12 VITALS — SYSTOLIC BLOOD PRESSURE: 108 MMHG | DIASTOLIC BLOOD PRESSURE: 77 MMHG

## 2018-05-12 VITALS — SYSTOLIC BLOOD PRESSURE: 120 MMHG | DIASTOLIC BLOOD PRESSURE: 67 MMHG

## 2018-05-12 VITALS — SYSTOLIC BLOOD PRESSURE: 101 MMHG | DIASTOLIC BLOOD PRESSURE: 88 MMHG

## 2018-05-12 VITALS — SYSTOLIC BLOOD PRESSURE: 112 MMHG | DIASTOLIC BLOOD PRESSURE: 74 MMHG

## 2018-05-12 RX ADMIN — Medication PRN ML: at 01:41

## 2018-05-12 RX ADMIN — Medication SCH EACH: at 08:36

## 2018-05-12 RX ADMIN — OXYCODONE HYDROCHLORIDE AND ACETAMINOPHEN SCH MG: 500 TABLET ORAL at 17:29

## 2018-05-12 RX ADMIN — LINEZOLID SCH MG: 600 TABLET, FILM COATED ORAL at 08:36

## 2018-05-12 RX ADMIN — Medication PRN EACH: at 01:42

## 2018-05-12 RX ADMIN — ATORVASTATIN CALCIUM SCH MG: 10 TABLET, FILM COATED ORAL at 21:08

## 2018-05-12 RX ADMIN — LINEZOLID SCH MG: 600 TABLET, FILM COATED ORAL at 21:08

## 2018-05-12 RX ADMIN — SIMETHICONE SCH MG: 20 SUSPENSION/ DROPS ORAL at 08:54

## 2018-05-12 RX ADMIN — FUROSEMIDE SCH MG: 20 TABLET ORAL at 08:36

## 2018-05-12 RX ADMIN — METOPROLOL TARTRATE SCH MG: 25 TABLET, FILM COATED ORAL at 08:54

## 2018-05-12 RX ADMIN — LEVALBUTEROL HYDROCHLORIDE SCH MG: 1.25 SOLUTION, CONCENTRATE RESPIRATORY (INHALATION) at 07:50

## 2018-05-12 RX ADMIN — RIFAXIMIN SCH MG: 550 TABLET ORAL at 17:29

## 2018-05-12 RX ADMIN — METOPROLOL TARTRATE SCH MG: 25 TABLET, FILM COATED ORAL at 21:08

## 2018-05-12 RX ADMIN — DOCUSATE SODIUM SCH MG: 50 LIQUID ORAL at 17:29

## 2018-05-12 RX ADMIN — FOLIC ACID SCH MG: 1 TABLET ORAL at 08:35

## 2018-05-12 RX ADMIN — LORAZEPAM PRN MG: 1 TABLET ORAL at 22:50

## 2018-05-12 RX ADMIN — Medication PRN EACH: at 06:04

## 2018-05-12 RX ADMIN — SIMETHICONE SCH MG: 20 SUSPENSION/ DROPS ORAL at 21:08

## 2018-05-12 RX ADMIN — LEVALBUTEROL HYDROCHLORIDE SCH MG: 1.25 SOLUTION, CONCENTRATE RESPIRATORY (INHALATION) at 16:01

## 2018-05-12 RX ADMIN — SIMETHICONE SCH MG: 20 SUSPENSION/ DROPS ORAL at 17:30

## 2018-05-12 RX ADMIN — RIFAXIMIN SCH MG: 550 TABLET ORAL at 08:35

## 2018-05-12 RX ADMIN — Medication SCH EACH: at 17:29

## 2018-05-12 RX ADMIN — DOCUSATE SODIUM SCH MG: 50 LIQUID ORAL at 08:36

## 2018-05-12 RX ADMIN — SIMETHICONE SCH MG: 20 SUSPENSION/ DROPS ORAL at 12:41

## 2018-05-12 RX ADMIN — Medication PRN EACH: at 12:41

## 2018-05-12 RX ADMIN — Medication PRN EACH: at 21:16

## 2018-05-12 RX ADMIN — ENOXAPARIN SODIUM SCH MG: 30 INJECTION SUBCUTANEOUS at 08:40

## 2018-05-12 RX ADMIN — PANTOPRAZOLE SODIUM SCH MG: 40 GRANULE, DELAYED RELEASE ORAL at 08:55

## 2018-05-12 NOTE — NUR
TELE RN OPENING NOTES



RECEIVED PATIENT IN BED.SLEEPING.TRACH VENT DEPENDANT.TOLERATING  WELL WITH SETTING AS 
ORDERED .ON TELEMONITOR SINUS RHYTHM OF 71 WITH OCCASIONALLY UNCONTROLLED AFIB.ON GT FEEDING 
OFF JEVITY 1.2 @50CC/HR.IV ON LFA #18.BED IS LOCKED AND IN LOW POSITION.SRX3.CALL LIGHT IN 
REACH.SAFETY MAINTAINED .WILL CONTINUE TO MONITOR.

## 2018-05-12 NOTE — NUR
TELE RN SHIFT END NOTES



 PATIENT IN BED.AWAKE.TRACH VENT DEPENDANT.TOLERATING  WELL WITH SETTING AS ORDERED .NO SOB 
NO DISTRESS NOTICED DURING CARE.ON TELEMONITOR SINUS RHYTHM OF 84 WITH OCCASIONALLY 
UNCONTROLLED AFIB.ON GT FEEDING OFF JEVITY 1.2 @50CC/HR.NO RESIDUAL NOTED DURING THE 
SHIFT.IV ON LFA #18.BED IS LOCKED AND IN LOW POSITION.SRX3.CALL LIGHT IN REACH.SAFETY 
MAINTAINED .ALL NEEDS MET.WILL ENDORSE TO PM NURSE FOR PHONG.

## 2018-05-12 NOTE — NUR
TELE RN OPENING NOTES

RECEIVED REPORT FROM EMILY MCKEE. PATIENT A/A/O X3, ABLE TO VERBALIZE NEEDS. BREATHING EVEN & 
UNLABORED W/ TRACH INTACT & VENT SETTINGS AC 16, , FI02 35%, PEEP 0. ON TELE W/ SINUS 
TACH W/ OCCASIONAL A-FIB, . DENIES ANY CHEST PAIN OR DISCOMFORT @ THIS TIME. NO 
RESPIRATORY DISTRESS NOTED. LEFT FOREARM IV #18 INTACT & PATENT W/ DRESSING CDI, SALINE 
LOCKED. G-TUBE FLUSHING WELL W/ NO RESIDUAL NOTED @ THIS TIME. GTF JEVITY 1.2 @ 50 ML/HR.  
SKIN WARM, DRY & INTACT. DENIES ANY PAIN OR DISCOMFORT. DENIES N/V/D. SAFETY MEASURES IN 
PLACE & CALL LIGHT WITHIN REACH. HOB ELEVATED FOR ASPIRATION PRECAUTIONS. FAMILY @ BEDSIDE. 
WILL CONTINUE TO MONITOR.

## 2018-05-12 NOTE — NUR
TELE RN NOTES

SEEN BY  FROM GI,NOTIFIED THAT PATIENT'S ABDOMEN IS BLOATED AND BURPING MANY 
TIMES.NO RESIDUAL NOTED.HAS SOFT BM.GOT NEW ORDER.CONTINUE TO MONITOR.

## 2018-05-13 VITALS — SYSTOLIC BLOOD PRESSURE: 125 MMHG | DIASTOLIC BLOOD PRESSURE: 68 MMHG

## 2018-05-13 VITALS — SYSTOLIC BLOOD PRESSURE: 138 MMHG | DIASTOLIC BLOOD PRESSURE: 83 MMHG

## 2018-05-13 VITALS — DIASTOLIC BLOOD PRESSURE: 58 MMHG | SYSTOLIC BLOOD PRESSURE: 105 MMHG

## 2018-05-13 VITALS — SYSTOLIC BLOOD PRESSURE: 127 MMHG | DIASTOLIC BLOOD PRESSURE: 88 MMHG

## 2018-05-13 VITALS — SYSTOLIC BLOOD PRESSURE: 120 MMHG | DIASTOLIC BLOOD PRESSURE: 67 MMHG

## 2018-05-13 VITALS — SYSTOLIC BLOOD PRESSURE: 127 MMHG | DIASTOLIC BLOOD PRESSURE: 75 MMHG

## 2018-05-13 RX ADMIN — ATORVASTATIN CALCIUM SCH MG: 10 TABLET, FILM COATED ORAL at 21:02

## 2018-05-13 RX ADMIN — LEVALBUTEROL HYDROCHLORIDE SCH MG: 1.25 SOLUTION, CONCENTRATE RESPIRATORY (INHALATION) at 00:09

## 2018-05-13 RX ADMIN — LINEZOLID SCH MG: 600 TABLET, FILM COATED ORAL at 09:16

## 2018-05-13 RX ADMIN — SIMETHICONE SCH MG: 20 SUSPENSION/ DROPS ORAL at 12:57

## 2018-05-13 RX ADMIN — RIFAXIMIN SCH MG: 550 TABLET ORAL at 18:05

## 2018-05-13 RX ADMIN — Medication SCH EACH: at 18:05

## 2018-05-13 RX ADMIN — METOPROLOL TARTRATE SCH MG: 25 TABLET, FILM COATED ORAL at 20:46

## 2018-05-13 RX ADMIN — Medication PRN OZ: at 12:58

## 2018-05-13 RX ADMIN — SIMETHICONE SCH MG: 20 SUSPENSION/ DROPS ORAL at 09:17

## 2018-05-13 RX ADMIN — Medication PRN ML: at 02:19

## 2018-05-13 RX ADMIN — Medication PRN EACH: at 05:07

## 2018-05-13 RX ADMIN — FUROSEMIDE SCH MG: 20 TABLET ORAL at 09:16

## 2018-05-13 RX ADMIN — Medication SCH EACH: at 09:16

## 2018-05-13 RX ADMIN — RIFAXIMIN SCH MG: 550 TABLET ORAL at 09:16

## 2018-05-13 RX ADMIN — SIMETHICONE SCH MG: 20 SUSPENSION/ DROPS ORAL at 18:05

## 2018-05-13 RX ADMIN — LEVALBUTEROL HYDROCHLORIDE SCH MG: 1.25 SOLUTION, CONCENTRATE RESPIRATORY (INHALATION) at 15:26

## 2018-05-13 RX ADMIN — PANTOPRAZOLE SODIUM SCH MG: 40 GRANULE, DELAYED RELEASE ORAL at 09:16

## 2018-05-13 RX ADMIN — ENOXAPARIN SODIUM SCH MG: 30 INJECTION SUBCUTANEOUS at 09:15

## 2018-05-13 RX ADMIN — FOLIC ACID SCH MG: 1 TABLET ORAL at 09:16

## 2018-05-13 RX ADMIN — DOCUSATE SODIUM SCH MG: 50 LIQUID ORAL at 09:16

## 2018-05-13 RX ADMIN — LORAZEPAM PRN MG: 1 TABLET ORAL at 20:54

## 2018-05-13 RX ADMIN — SIMETHICONE SCH MG: 20 SUSPENSION/ DROPS ORAL at 20:55

## 2018-05-13 RX ADMIN — Medication PRN EACH: at 12:57

## 2018-05-13 RX ADMIN — METOPROLOL TARTRATE SCH MG: 25 TABLET, FILM COATED ORAL at 09:16

## 2018-05-13 RX ADMIN — LINEZOLID SCH MG: 600 TABLET, FILM COATED ORAL at 20:46

## 2018-05-13 RX ADMIN — OXYCODONE HYDROCHLORIDE AND ACETAMINOPHEN SCH MG: 500 TABLET ORAL at 18:05

## 2018-05-13 RX ADMIN — LEVALBUTEROL HYDROCHLORIDE SCH MG: 1.25 SOLUTION, CONCENTRATE RESPIRATORY (INHALATION) at 07:27

## 2018-05-13 NOTE — NUR
TELE1/RN     ROUNDS - DR. GRAY



UPDATED PT'S CONDITION. PT SEEN & EXAMINED BY DR. GRAY WITH FAMILY MEMBERS AT BEDSIDE.  NO 
NEW ORDERS RECEIVED AT THIS TIME.  MONITORING CONTINUED.

## 2018-05-13 NOTE — NUR
TELE1/RN     AM SHIFT END NOTES



ALL NEEDS MET.  NO ACUTE CHANGE OF CONDITION NOTED DURING THE SHIFT.  PT ENDORSED TO PM 
NURSE TO CONTINUE CARE. ALSO ENDORSED TO COLLECT URINE FOR CULTURE.  CL WITHIN REACHED, 
SAFETY MAINTAINED AND ISOLATION OBSERVED.

## 2018-05-13 NOTE — NUR
RN TELE NOTES,



RECEIVED PATIENT IN BED, AWAKE ALERT AND ORIENTED, ABLE TO COMMUNICATE NEEDS AND CONCERNS, 
ON VENT SUPPORT TOLERATING SETTINGS WELL, BURPING CONSTANTLY, NO DISCOMFORT OR PAIN AT THIS 
TIME,  STOMACH SOF AND NONDISTENDED, GTF RUNNING WELL AND PATIENT TOLERATED WELL, LEFT FA IV 
ACCESS PATENT AND INTACT, FAMILY AT BEDSIDE, ALL NEEDS PROVIDED AND ATTEMPTED, DRY AND CLEAN 
AT THIS TIME, REPOSITION PROVIDED,  CALL LIGHT W/I REACH, WILL CONTINUE TO MONITOR CLOSELY.

## 2018-05-13 NOTE — NUR
TELE1/RN     AM SHIFT INITIAL NOTES



RECEIVED AT AWAKE IN BED, NO ACUTE RESPIRATORY DISTRESS NOTED.  PT A/O X 3, CALM AT THIS 
TIME.  ON VENTILATOR SUPPORT WITH RATES SET AS PRESCRIBED, SATURATING @ 100%, LUNG SOUNDS 
CLEAR.  ON TELE WITH SINUS TACHY, .  IV SITE FLUSHED, PATENT WITH NO S/S OF INFECTION, 
SL. WITH ON GOING GT FEEDING @ 50CC/HR, NO GASTRIC RESIDUAL NOTED, FLUSHED. PT IS 
COMFORTABLE AT THIS TIME. SCHEDULED AM MEDS TO BE GIVEN.  CL WITHIN REACHED, SAFETY 
MAINTAINED AND ISOLATION OBSERVED.  ON GOING MONITORING.

## 2018-05-13 NOTE — NUR
TELE1/RN     AFTERNOON ROUNDS



PM CARE PROVIDED, NO ACUTE CHANGE OF CONDITION. ON GOING MONITORING.

## 2018-05-14 VITALS — DIASTOLIC BLOOD PRESSURE: 77 MMHG | SYSTOLIC BLOOD PRESSURE: 135 MMHG

## 2018-05-14 VITALS — SYSTOLIC BLOOD PRESSURE: 95 MMHG | DIASTOLIC BLOOD PRESSURE: 53 MMHG

## 2018-05-14 VITALS — SYSTOLIC BLOOD PRESSURE: 133 MMHG | DIASTOLIC BLOOD PRESSURE: 73 MMHG

## 2018-05-14 VITALS — DIASTOLIC BLOOD PRESSURE: 53 MMHG | SYSTOLIC BLOOD PRESSURE: 95 MMHG

## 2018-05-14 VITALS — DIASTOLIC BLOOD PRESSURE: 75 MMHG | SYSTOLIC BLOOD PRESSURE: 108 MMHG

## 2018-05-14 VITALS — DIASTOLIC BLOOD PRESSURE: 73 MMHG | SYSTOLIC BLOOD PRESSURE: 142 MMHG

## 2018-05-14 VITALS — DIASTOLIC BLOOD PRESSURE: 80 MMHG | SYSTOLIC BLOOD PRESSURE: 122 MMHG

## 2018-05-14 RX ADMIN — SIMETHICONE SCH MG: 20 SUSPENSION/ DROPS ORAL at 14:07

## 2018-05-14 RX ADMIN — LINEZOLID SCH MG: 600 TABLET, FILM COATED ORAL at 20:21

## 2018-05-14 RX ADMIN — OXYCODONE HYDROCHLORIDE AND ACETAMINOPHEN SCH MG: 500 TABLET ORAL at 18:34

## 2018-05-14 RX ADMIN — Medication PRN EACH: at 02:28

## 2018-05-14 RX ADMIN — PANTOPRAZOLE SODIUM SCH MG: 40 GRANULE, DELAYED RELEASE ORAL at 11:51

## 2018-05-14 RX ADMIN — LEVALBUTEROL HYDROCHLORIDE SCH MG: 1.25 SOLUTION, CONCENTRATE RESPIRATORY (INHALATION) at 15:13

## 2018-05-14 RX ADMIN — LEVALBUTEROL HYDROCHLORIDE SCH MG: 1.25 SOLUTION, CONCENTRATE RESPIRATORY (INHALATION) at 23:30

## 2018-05-14 RX ADMIN — Medication PRN EACH: at 20:22

## 2018-05-14 RX ADMIN — LORAZEPAM PRN MG: 1 TABLET ORAL at 18:38

## 2018-05-14 RX ADMIN — Medication SCH EACH: at 18:34

## 2018-05-14 RX ADMIN — ATORVASTATIN CALCIUM SCH MG: 10 TABLET, FILM COATED ORAL at 21:01

## 2018-05-14 RX ADMIN — Medication PRN EACH: at 14:07

## 2018-05-14 RX ADMIN — SIMETHICONE SCH MG: 20 SUSPENSION/ DROPS ORAL at 18:34

## 2018-05-14 RX ADMIN — RIFAXIMIN SCH MG: 550 TABLET ORAL at 18:38

## 2018-05-14 RX ADMIN — FOLIC ACID SCH MG: 1 TABLET ORAL at 11:52

## 2018-05-14 RX ADMIN — LEVALBUTEROL HYDROCHLORIDE SCH MG: 1.25 SOLUTION, CONCENTRATE RESPIRATORY (INHALATION) at 00:16

## 2018-05-14 RX ADMIN — METOPROLOL TARTRATE SCH MG: 25 TABLET, FILM COATED ORAL at 11:52

## 2018-05-14 RX ADMIN — ENOXAPARIN SODIUM SCH MG: 30 INJECTION SUBCUTANEOUS at 11:55

## 2018-05-14 RX ADMIN — RIFAXIMIN SCH MG: 550 TABLET ORAL at 11:51

## 2018-05-14 RX ADMIN — LINEZOLID SCH MG: 600 TABLET, FILM COATED ORAL at 11:52

## 2018-05-14 RX ADMIN — SIMETHICONE SCH MG: 20 SUSPENSION/ DROPS ORAL at 20:23

## 2018-05-14 RX ADMIN — SIMETHICONE SCH MG: 20 SUSPENSION/ DROPS ORAL at 11:56

## 2018-05-14 RX ADMIN — Medication SCH EACH: at 11:53

## 2018-05-14 RX ADMIN — FUROSEMIDE SCH MG: 20 TABLET ORAL at 11:52

## 2018-05-14 RX ADMIN — LEVALBUTEROL HYDROCHLORIDE SCH MG: 1.25 SOLUTION, CONCENTRATE RESPIRATORY (INHALATION) at 07:46

## 2018-05-14 RX ADMIN — METOPROLOL TARTRATE SCH MG: 25 TABLET, FILM COATED ORAL at 20:23

## 2018-05-14 NOTE — NUR
Pt c/o pain to abdomen by 1430 was medicated with norco 5/325 gt 5/10, now pain 2/10, also 
medicated with ativan 1 mg gt for anxiety, Dr Mott was called to [phone] by 1723 
awaiting for call back to report pt's abdominal pain at gt site, gt site clean no drainage 
and no s/s of infection, but pt feels pain at gt site,and was clean, reposition for comfort.

## 2018-05-14 NOTE — NUR
RN notes: pt in bed repositioned for comfort, incontinent of B/B, gt feeding tolerated well, 
no sob, no respiratory distress.

## 2018-05-14 NOTE — NUR
RN TELE NOTES,



RECEIVED PATIENT IN BED, AWAKE ALERT AND ORIENTED, ABLE TO COMMUNICATE NEEDS AND CONCERNS, 
ON VENTILATOR SUPPORT TOLERATING SETTINGS WELL, O2 SATURATION 100% AT THIS TIME , C/O  PAIN 
AT THIS TIME, WILL ADMINISTER PAIN MEDICATION AS ORDERED,  STOMACH SOFT AND NONDISTENDED, 
GTF RUNNING WELL AND PATIENT TOLERATED WELL, LEFT FA IV ACCESS PATENT AND INTACT S/L, ALL 
NEEDS PROVIDED AND ATTEMPTED, DRY AND CLEAN AT THIS TIME, REPOSITION PROVIDED,  BED LOCLED 
AND PROPER POSITION, CALL LIGHT W/I REACH, WILL CONTINUE TO MONITOR CLOSELY.

## 2018-05-14 NOTE — NUR
Pt in bed awake, alert, oriented x2, thrach in place suctions prn, gt feeding continues and 
tolerated, reposition for comfort, ST on the monitor, no s/s of complications.

## 2018-05-14 NOTE — NUR
RN TELE NOTES,



PATIENT IN BED, AWAKE ALERT AND ORIENTED, ABLE TO COMMUNICATE NEEDS AND CONCERNS, ON 
VENTILATOR  SUPPORT TOLERATING SETTINGS WELL, SUCTIONING PROVIDED AT THIS TIME, NO 
DISCOMFORT OR PAIN AT THIS TIME,  STOMACH SOFT AND NONDISTENDED, GTF RUNNING WELL AND 
PATIENT TOLERATED WELL, HOB ELEVATED AT ALL TIMES FOR ASPIRATION PRECAUTIONS LEFT FA IV 
ACCESS PATENT AND INTACT,  ALL NEEDS PROVIDED AND ATTEMPTED, DRY AND CLEAN AT THIS TIME, 
REPOSITION PROVIDED, NO SIGNIFICANT CHANGE OF CONDITION THROUGHOUT  THE SHIFT, BED LOCLED 
AND IN PROPER POSITION,  CALL LIGHT W/I REACH, WILL ENDORSE CONTINUITY OF CARE TO ONCOMING 
NURSE.

## 2018-05-15 VITALS — DIASTOLIC BLOOD PRESSURE: 85 MMHG | SYSTOLIC BLOOD PRESSURE: 120 MMHG

## 2018-05-15 VITALS — SYSTOLIC BLOOD PRESSURE: 118 MMHG | DIASTOLIC BLOOD PRESSURE: 52 MMHG

## 2018-05-15 VITALS — DIASTOLIC BLOOD PRESSURE: 80 MMHG | SYSTOLIC BLOOD PRESSURE: 142 MMHG

## 2018-05-15 VITALS — SYSTOLIC BLOOD PRESSURE: 121 MMHG | DIASTOLIC BLOOD PRESSURE: 62 MMHG

## 2018-05-15 VITALS — SYSTOLIC BLOOD PRESSURE: 105 MMHG | DIASTOLIC BLOOD PRESSURE: 78 MMHG

## 2018-05-15 VITALS — DIASTOLIC BLOOD PRESSURE: 67 MMHG | SYSTOLIC BLOOD PRESSURE: 123 MMHG

## 2018-05-15 RX ADMIN — METOPROLOL TARTRATE SCH MG: 25 TABLET, FILM COATED ORAL at 21:54

## 2018-05-15 RX ADMIN — PANTOPRAZOLE SODIUM SCH MG: 40 GRANULE, DELAYED RELEASE ORAL at 08:29

## 2018-05-15 RX ADMIN — LEVALBUTEROL HYDROCHLORIDE SCH MG: 1.25 SOLUTION, CONCENTRATE RESPIRATORY (INHALATION) at 00:30

## 2018-05-15 RX ADMIN — LEVALBUTEROL HYDROCHLORIDE SCH MG: 1.25 SOLUTION, CONCENTRATE RESPIRATORY (INHALATION) at 07:43

## 2018-05-15 RX ADMIN — ATORVASTATIN CALCIUM SCH MG: 10 TABLET, FILM COATED ORAL at 21:53

## 2018-05-15 RX ADMIN — SIMETHICONE SCH MG: 20 SUSPENSION/ DROPS ORAL at 13:33

## 2018-05-15 RX ADMIN — LEVALBUTEROL HYDROCHLORIDE SCH MG: 1.25 SOLUTION, CONCENTRATE RESPIRATORY (INHALATION) at 15:38

## 2018-05-15 RX ADMIN — RIFAXIMIN SCH MG: 550 TABLET ORAL at 08:28

## 2018-05-15 RX ADMIN — LORAZEPAM PRN MG: 1 TABLET ORAL at 10:30

## 2018-05-15 RX ADMIN — LINEZOLID SCH MG: 600 TABLET, FILM COATED ORAL at 08:29

## 2018-05-15 RX ADMIN — LINEZOLID SCH MG: 600 TABLET, FILM COATED ORAL at 21:53

## 2018-05-15 RX ADMIN — Medication PRN EACH: at 16:04

## 2018-05-15 RX ADMIN — OXYCODONE HYDROCHLORIDE AND ACETAMINOPHEN SCH MG: 500 TABLET ORAL at 16:30

## 2018-05-15 RX ADMIN — ENOXAPARIN SODIUM SCH MG: 30 INJECTION SUBCUTANEOUS at 08:30

## 2018-05-15 RX ADMIN — Medication SCH EACH: at 08:29

## 2018-05-15 RX ADMIN — LEVALBUTEROL HYDROCHLORIDE SCH MG: 1.25 SOLUTION, CONCENTRATE RESPIRATORY (INHALATION) at 23:37

## 2018-05-15 RX ADMIN — SIMETHICONE SCH MG: 20 SUSPENSION/ DROPS ORAL at 08:30

## 2018-05-15 RX ADMIN — Medication PRN EACH: at 06:03

## 2018-05-15 RX ADMIN — RIFAXIMIN SCH MG: 550 TABLET ORAL at 16:04

## 2018-05-15 RX ADMIN — SIMETHICONE SCH MG: 20 SUSPENSION/ DROPS ORAL at 16:05

## 2018-05-15 RX ADMIN — Medication PRN ML: at 06:02

## 2018-05-15 RX ADMIN — METOPROLOL TARTRATE SCH MG: 25 TABLET, FILM COATED ORAL at 08:35

## 2018-05-15 RX ADMIN — Medication SCH EACH: at 16:04

## 2018-05-15 RX ADMIN — FOLIC ACID SCH MG: 1 TABLET ORAL at 08:29

## 2018-05-15 RX ADMIN — Medication PRN EACH: at 21:58

## 2018-05-15 RX ADMIN — FUROSEMIDE SCH MG: 20 TABLET ORAL at 08:28

## 2018-05-15 RX ADMIN — SIMETHICONE SCH MG: 20 SUSPENSION/ DROPS ORAL at 21:54

## 2018-05-15 NOTE — NUR
RN NOTE



RECEIVED PATIENT IN BED, AWAKE ALERT AND ORIENTED WATCHING T.V, SHE IS ABLE TO COMMUNICATE 
AND MOUTH HER WORDS TO VERBALIZE NEEDS, ON VENTILATOR SUPPORT WITH APPROPRIATE SETTINGS AND 
TOLERATING WELL.  GTF INTACT AND PATENT WITH ONGOING FEEDINGS RUNNING WELL WITH NO RESIDUALS 
NOTED. LEFT FA IV SITE INTACT AND PATENT. BED LOCKED AND LOW POSITION, ALL ISOLATION 
PRECAUTIONS DONE, PLACED CALL LIGHT WITH IN REACH, WILL CONTINUE TO MONITOR.

## 2018-05-15 NOTE — NUR
RN TELE NOTES,



PATIENT  AWAKE ALERT AND ORIENTED, ABLE TO COMMUNICATE NEEDS AND CONCERNS, ON VENTILATOR 
SUPPORT TOLERATING SETTINGS WELL, O2 SATURATION 100% AT THIS TIME , C/O  PAIN EARLIER AND 
NORCO ADMINISTERED AS ORDERED, GTF RUNNING WELL AND PATIENT TOLERATED WELL, LEFT FA IV 
ACCESS PATENT AND INTACT S/L, ALL NEEDS PROVIDED AND ATTEMPTED, DRY AND CLEAN AT THIS TIME, 
REPOSITION PROVIDED,  BED LOCKED AND PROPER POSITION, CALL LIGHT W/I REACH, NO SIGNIFICANT 
CHANGE OF CONDITION DURING THE SHIFT, WILL ENDORSED CONTINUITY OF CARE TO ONCOMING NURSE.

## 2018-05-15 NOTE — NUR
TELE/RN NOTES:



RECEIVED PT. IN BED W/ HOB ELEVATED. A/O X 3. ABLE TO MOUTH WORDS. ON MECHANICAL VENTILATOR 
TOLERATING SETTINGS WELL W/ NO S/S OF FACIAL GRIMACES OR MOANING NOTED. W/ GTF TOLERATING 
WELL W/ NO RESIDUAL NOTED. ON TELE MONITOR W/ ST @ 110. HAS LFA G 18 PATENT AND INTACT W/ NO 
RESIDUAL NOTED. CALL LIGHT W/REACH. SUCTION PRN. WILL CONTINUE TO MONITOR.

## 2018-05-15 NOTE — NUR
RN NOTE



89 YEAR OLD MALE DISCHARGED TO [facility] IN STABLE CONDITION. 
COMPLIANT WITH MEDICATIONS, COOPERATIVE WITH TREATMENT PLANS. TREATMENT PLANS AND MEDICAL 
PLANS DEFERRED FOR CONTINUAL MONITORING. EDUCATED PATIENT ABOUT AFTER CARE PLAN AND COPIES 
PROVIDED WITH PATIENT AND AMBULANCE. RETURNED PERSONAL BELONGINGS TO PATIENT. MEDICATIONS 
RECONCILED, REPORT GIVEN TO EL MCKEE AT [facility] FOR CONTINUITY 
OF CARE. PATIENT UNABLE TO SIGN DISCHARGE PAPERWORK, BUT WAS ABLE TO GET SECOND RN IVA TO 
WITNESS AND SIGN. WOUND PICTURES TAKEN AND DOCUMENTED IN CHART. PATIENTS WIFE WAS NOTIFIED 
ABOUT DISCHARGE AND TRANSFER TO FACILITY. PATIENT LEFT THE UNIT AT 1355 VIA AMBULANCE.

-------------------------------------------------------------------------------

Addendum: 05/15/18 at 1858 by ROGELIO MAYES RN

-------------------------------------------------------------------------------

RYLEE NOTE



DISREGARD PREVIOUS DOCUMENTATION AT 1355

## 2018-05-15 NOTE — NUR
RN NOTE



PATIENT REMAINED STABLE THROUGHOUT SHIFT. NO DISTRESS OR DISCOMFORT NOTED. WILL ENDORSE TO 
NEXT SHIFT TO COLLECT URINE (URINE CULTURE) BEFORE MIDNIGHT AND SEND IT TO LAB.

## 2018-05-16 VITALS — DIASTOLIC BLOOD PRESSURE: 66 MMHG | SYSTOLIC BLOOD PRESSURE: 117 MMHG

## 2018-05-16 VITALS — DIASTOLIC BLOOD PRESSURE: 54 MMHG | SYSTOLIC BLOOD PRESSURE: 107 MMHG

## 2018-05-16 VITALS — DIASTOLIC BLOOD PRESSURE: 69 MMHG | SYSTOLIC BLOOD PRESSURE: 105 MMHG

## 2018-05-16 VITALS — SYSTOLIC BLOOD PRESSURE: 100 MMHG | DIASTOLIC BLOOD PRESSURE: 66 MMHG

## 2018-05-16 VITALS — SYSTOLIC BLOOD PRESSURE: 128 MMHG | DIASTOLIC BLOOD PRESSURE: 66 MMHG

## 2018-05-16 VITALS — SYSTOLIC BLOOD PRESSURE: 131 MMHG | DIASTOLIC BLOOD PRESSURE: 71 MMHG

## 2018-05-16 LAB
BASOPHILS # BLD AUTO: 0 /CMM (ref 0–0.2)
BASOPHILS NFR BLD AUTO: 0.1 % (ref 0–2)
BUN SERPL-MCNC: 18 MG/DL (ref 7–18)
CALCIUM SERPL-MCNC: 9 MG/DL (ref 8.5–10.1)
CHLORIDE SERPL-SCNC: 99 MMOL/L (ref 98–107)
CO2 SERPL-SCNC: 30 MMOL/L (ref 21–32)
CREAT SERPL-MCNC: 0.7 MG/DL (ref 0.6–1.3)
EOSINOPHIL NFR BLD AUTO: 1 % (ref 0–6)
GLUCOSE SERPL-MCNC: 122 MG/DL (ref 74–106)
HCT VFR BLD AUTO: 34 % (ref 33–45)
HGB BLD-MCNC: 10.3 G/DL (ref 11.5–14.8)
LYMPHOCYTES NFR BLD AUTO: 1.7 /CMM (ref 0.8–4.8)
LYMPHOCYTES NFR BLD AUTO: 17.1 % (ref 20–44)
MAGNESIUM SERPL-MCNC: 1.8 MG/DL (ref 1.8–2.4)
MCHC RBC AUTO-ENTMCNC: 31 G/DL (ref 31–36)
MCV RBC AUTO: 84 FL (ref 82–100)
MONOCYTES NFR BLD AUTO: 0.5 /CMM (ref 0.1–1.3)
MONOCYTES NFR BLD AUTO: 4.5 % (ref 2–12)
NEUTROPHILS # BLD AUTO: 7.8 /CMM (ref 1.8–8.9)
NEUTROPHILS NFR BLD AUTO: 77.3 % (ref 43–81)
PHOSPHATE SERPL-MCNC: 3.6 MG/DL (ref 2.5–4.9)
PLATELET # BLD AUTO: 228 /CMM (ref 150–450)
POTASSIUM SERPL-SCNC: 4.4 MMOL/L (ref 3.5–5.1)
RBC # BLD AUTO: 4.04 MIL/UL (ref 4–5.2)
RDW COEFFICIENT OF VARIATION: 18.1 (ref 11.5–15)
SODIUM SERPL-SCNC: 138 MMOL/L (ref 136–145)
WBC NRBC COR # BLD AUTO: 10.1 K/UL (ref 4.3–11)

## 2018-05-16 RX ADMIN — SIMETHICONE SCH MG: 20 SUSPENSION/ DROPS ORAL at 12:37

## 2018-05-16 RX ADMIN — ATORVASTATIN CALCIUM SCH MG: 10 TABLET, FILM COATED ORAL at 21:26

## 2018-05-16 RX ADMIN — Medication PRN EACH: at 15:36

## 2018-05-16 RX ADMIN — OXYCODONE HYDROCHLORIDE AND ACETAMINOPHEN SCH MG: 500 TABLET ORAL at 16:35

## 2018-05-16 RX ADMIN — RIFAXIMIN SCH MG: 550 TABLET ORAL at 16:35

## 2018-05-16 RX ADMIN — Medication PRN EACH: at 02:50

## 2018-05-16 RX ADMIN — FUROSEMIDE SCH MG: 20 TABLET ORAL at 08:10

## 2018-05-16 RX ADMIN — PANTOPRAZOLE SODIUM SCH MG: 40 GRANULE, DELAYED RELEASE ORAL at 08:11

## 2018-05-16 RX ADMIN — SIMETHICONE SCH MG: 20 SUSPENSION/ DROPS ORAL at 21:26

## 2018-05-16 RX ADMIN — Medication PRN EACH: at 23:38

## 2018-05-16 RX ADMIN — LEVALBUTEROL HYDROCHLORIDE SCH MG: 1.25 SOLUTION, CONCENTRATE RESPIRATORY (INHALATION) at 15:28

## 2018-05-16 RX ADMIN — Medication SCH EACH: at 16:35

## 2018-05-16 RX ADMIN — Medication SCH EACH: at 08:07

## 2018-05-16 RX ADMIN — LINEZOLID SCH MG: 600 TABLET, FILM COATED ORAL at 21:26

## 2018-05-16 RX ADMIN — RIFAXIMIN SCH MG: 550 TABLET ORAL at 08:03

## 2018-05-16 RX ADMIN — ENOXAPARIN SODIUM SCH MG: 30 INJECTION SUBCUTANEOUS at 08:05

## 2018-05-16 RX ADMIN — LEVALBUTEROL HYDROCHLORIDE SCH MG: 1.25 SOLUTION, CONCENTRATE RESPIRATORY (INHALATION) at 07:38

## 2018-05-16 RX ADMIN — METOPROLOL TARTRATE SCH MG: 25 TABLET, FILM COATED ORAL at 08:06

## 2018-05-16 RX ADMIN — Medication PRN ML: at 06:17

## 2018-05-16 RX ADMIN — Medication PRN OZ: at 16:37

## 2018-05-16 RX ADMIN — LINEZOLID SCH MG: 600 TABLET, FILM COATED ORAL at 08:10

## 2018-05-16 RX ADMIN — LORAZEPAM PRN MG: 1 TABLET ORAL at 15:36

## 2018-05-16 RX ADMIN — FOLIC ACID SCH MG: 1 TABLET ORAL at 08:03

## 2018-05-16 RX ADMIN — SIMETHICONE SCH MG: 20 SUSPENSION/ DROPS ORAL at 16:37

## 2018-05-16 RX ADMIN — Medication PRN EACH: at 10:23

## 2018-05-16 RX ADMIN — ACETAMINOPHEN PRN MG: 325 TABLET ORAL at 19:03

## 2018-05-16 RX ADMIN — LEVALBUTEROL HYDROCHLORIDE SCH MG: 1.25 SOLUTION, CONCENTRATE RESPIRATORY (INHALATION) at 23:48

## 2018-05-16 RX ADMIN — SIMETHICONE SCH MG: 20 SUSPENSION/ DROPS ORAL at 08:13

## 2018-05-16 RX ADMIN — METOPROLOL TARTRATE SCH MG: 25 TABLET, FILM COATED ORAL at 21:26

## 2018-05-16 NOTE — NUR
TELE RN INITIAL NOTE 



PT IS IN BED AWAKE AND ALERT, ABLE TO MOUTH WORDS. PT IS ON VENT, TOLERATING SETTINGS WELL. 
NO SIGNS OF SOB OR DISTRESS, BREATHING EVENLY AND UNLABORED. DENIES PAIN AT THIS TIME. FARFAN 
CATHETER IS INTACT AND PATENT. TUBE FEEDING RUNNING AT 50 ML/HR. IV ACCESS IS INTACT AND 
PATENT ON SALINE LOCK. BED IS IN LOW AND LOCKED POSITION, CALL LIGHT WITHIN REACH. WILL 
CONTINUE TO MONITOR PT 

-------------------------------------------------------------------------------

Addendum: 05/17/18 at 0632 by BOWEN STAPLETON

-------------------------------------------------------------------------------

DOCUMENTED FARFAN CATH ON INCORRECT PT. PT IS IN DIAPER, INCONTINENT

## 2018-05-16 NOTE — NUR
TELE RN OPENING NOTES

RECEIVED REPORT FROM TYRELL MCKEE. PATIENT A/A/O X3, ABLE TO MOUTH WORDS & MAKE NEEDS KNOWN. 
BREATHING EVEN & UNLABORED W/ TRACH INTACT & VENT SETTINGS AC 16, , FI02 35%, PEEP 0. 
ON TELE W/ SINUS TACH, . DENIES ANY CHEST PAIN OR DISCOMFORT @ THIS TIME. NO 
RESPIRATORY DISTRESS NOTED. LEFT FOREARM IV #18 INTACT & PATENT W/ DRESSING CDI, SALINE 
LOCKED. G-TUBE FLUSHING WELL W/ NO RESIDUAL NOTED @ THIS TIME. GTF JEVITY 1.2 @ 50 ML/HR.  
SKIN WARM, DRY & INTACT. DENIES ANY PAIN OR DISCOMFORT. DENIES N/V/D. SAFETY MEASURES IN 
PLACE & CALL LIGHT WITHIN REACH. HOB ELEVATED FOR ASPIRATION PRECAUTIONS. WILL CONTINUE TO 
MONITOR.

## 2018-05-16 NOTE — NUR
RN NOTES 

PT EATING HER DINER , ON 2L O2 N/C , NO SOB NOTED,  O2 SAT 94%, NAHEED DRANING TO GRAVITY , 
SR UP x3, CALL LIGHT WITHIN EASY REACH, WILL ENDORSE TO PM NURSE FOR PHONG . 

-------------------------------------------------------------------------------

Addendum: 05/16/18 at 1835 by DIANE BALBUENA RN

-------------------------------------------------------------------------------

PLEASE DISRGARD ABOVE CHARTING , CHARTED ON WRONG PT

## 2018-05-16 NOTE — NUR
RN NOTES:

RECEIVED PT ON BED, A/Ox3, PT MOUTH WORDS , VENT/ TRACH DEPENDENT, TOLERATING CURRENT VENT 
SETTING WELL, TRACH CARE DONE, ON TELE ST,  HR 'S , TOLERATING TF JEVITY AT 50CC/HR , 
NO RESIDUAL NOTED, HOB ELEVATED, , LFA IV SITE  G 18 PATENT AND INTACT, SR UPx3,  CALL LIGHT 
WITHIN EASY REACH, BED LOCKED AND IN LOWEST POSITION ,  CONTINUE TO MONITOR.

## 2018-05-16 NOTE — NUR
RN NOTES 

PT STABLE , WOUND NOTED ON LABIAL , WOUND CONSULT ORDERED ,  TOLERATING  CURRENT VENT 
SETTING WELL, NO DISTRESS NOTED, NO TF RESIDUAL NOTED, L FA IV SITE CDI, SR UP x3, WILL 
ENDOSE TO NIGHT SHIFT NURSE FOR PHONG

## 2018-05-17 VITALS — SYSTOLIC BLOOD PRESSURE: 123 MMHG | DIASTOLIC BLOOD PRESSURE: 69 MMHG

## 2018-05-17 VITALS — DIASTOLIC BLOOD PRESSURE: 75 MMHG | SYSTOLIC BLOOD PRESSURE: 135 MMHG

## 2018-05-17 VITALS — DIASTOLIC BLOOD PRESSURE: 70 MMHG | SYSTOLIC BLOOD PRESSURE: 127 MMHG

## 2018-05-17 VITALS — DIASTOLIC BLOOD PRESSURE: 64 MMHG | SYSTOLIC BLOOD PRESSURE: 128 MMHG

## 2018-05-17 VITALS — DIASTOLIC BLOOD PRESSURE: 61 MMHG | SYSTOLIC BLOOD PRESSURE: 121 MMHG

## 2018-05-17 VITALS — SYSTOLIC BLOOD PRESSURE: 123 MMHG | DIASTOLIC BLOOD PRESSURE: 65 MMHG

## 2018-05-17 LAB
BUN SERPL-MCNC: 18 MG/DL (ref 7–18)
CALCIUM SERPL-MCNC: 8.8 MG/DL (ref 8.5–10.1)
CHLORIDE SERPL-SCNC: 99 MMOL/L (ref 98–107)
CO2 SERPL-SCNC: 33 MMOL/L (ref 21–32)
CREAT SERPL-MCNC: 0.8 MG/DL (ref 0.6–1.3)
GLUCOSE SERPL-MCNC: 104 MG/DL (ref 74–106)
MAGNESIUM SERPL-MCNC: 1.6 MG/DL (ref 1.8–2.4)
POTASSIUM SERPL-SCNC: 4.1 MMOL/L (ref 3.5–5.1)
SODIUM SERPL-SCNC: 135 MMOL/L (ref 136–145)

## 2018-05-17 RX ADMIN — METOPROLOL TARTRATE SCH MG: 25 TABLET, FILM COATED ORAL at 08:12

## 2018-05-17 RX ADMIN — SIMETHICONE SCH MG: 20 SUSPENSION/ DROPS ORAL at 13:21

## 2018-05-17 RX ADMIN — RIFAXIMIN SCH MG: 550 TABLET ORAL at 18:06

## 2018-05-17 RX ADMIN — Medication PRN ML: at 05:32

## 2018-05-17 RX ADMIN — SIMETHICONE PRN MG: 20 SUSPENSION/ DROPS ORAL at 09:27

## 2018-05-17 RX ADMIN — Medication SCH EACH: at 08:12

## 2018-05-17 RX ADMIN — SIMETHICONE SCH MG: 20 SUSPENSION/ DROPS ORAL at 21:05

## 2018-05-17 RX ADMIN — SIMETHICONE SCH MG: 20 SUSPENSION/ DROPS ORAL at 09:28

## 2018-05-17 RX ADMIN — Medication PRN EACH: at 13:25

## 2018-05-17 RX ADMIN — ZOLPIDEM TARTRATE PRN MG: 5 TABLET, FILM COATED ORAL at 21:05

## 2018-05-17 RX ADMIN — METOPROLOL TARTRATE SCH MG: 25 TABLET, FILM COATED ORAL at 21:05

## 2018-05-17 RX ADMIN — ZOLPIDEM TARTRATE PRN MG: 5 TABLET, FILM COATED ORAL at 01:20

## 2018-05-17 RX ADMIN — Medication PRN EACH: at 18:13

## 2018-05-17 RX ADMIN — LEVALBUTEROL HYDROCHLORIDE SCH MG: 1.25 SOLUTION, CONCENTRATE RESPIRATORY (INHALATION) at 16:29

## 2018-05-17 RX ADMIN — FUROSEMIDE SCH MG: 20 TABLET ORAL at 08:12

## 2018-05-17 RX ADMIN — LINEZOLID SCH MG: 600 TABLET, FILM COATED ORAL at 08:12

## 2018-05-17 RX ADMIN — RIFAXIMIN SCH MG: 550 TABLET ORAL at 08:12

## 2018-05-17 RX ADMIN — LEVALBUTEROL HYDROCHLORIDE SCH MG: 1.25 SOLUTION, CONCENTRATE RESPIRATORY (INHALATION) at 23:57

## 2018-05-17 RX ADMIN — ENOXAPARIN SODIUM SCH MG: 30 INJECTION SUBCUTANEOUS at 08:13

## 2018-05-17 RX ADMIN — PANTOPRAZOLE SODIUM SCH MG: 40 GRANULE, DELAYED RELEASE ORAL at 08:12

## 2018-05-17 RX ADMIN — LORAZEPAM PRN MG: 1 TABLET ORAL at 13:25

## 2018-05-17 RX ADMIN — DEXTROSE MONOHYDRATE PRN MG: 50 INJECTION, SOLUTION INTRAVENOUS at 08:04

## 2018-05-17 RX ADMIN — LEVALBUTEROL HYDROCHLORIDE SCH MG: 1.25 SOLUTION, CONCENTRATE RESPIRATORY (INHALATION) at 07:22

## 2018-05-17 RX ADMIN — ATORVASTATIN CALCIUM SCH MG: 10 TABLET, FILM COATED ORAL at 21:10

## 2018-05-17 RX ADMIN — SIMETHICONE SCH MG: 20 SUSPENSION/ DROPS ORAL at 18:06

## 2018-05-17 RX ADMIN — Medication SCH EACH: at 18:06

## 2018-05-17 RX ADMIN — LINEZOLID SCH MG: 600 TABLET, FILM COATED ORAL at 21:05

## 2018-05-17 RX ADMIN — OXYCODONE HYDROCHLORIDE AND ACETAMINOPHEN SCH MG: 500 TABLET ORAL at 18:06

## 2018-05-17 RX ADMIN — FOLIC ACID SCH MG: 1 TABLET ORAL at 08:12

## 2018-05-17 RX ADMIN — Medication PRN EACH: at 08:04

## 2018-05-17 NOTE — NUR
TELE/RN NOTES:



RECEIVED PT. IN BED ALERT AND AWAKE X 3. ABLE TO MOUTH WORD AND MAKE HER NEEDS KNOWN. ON 
MECHANICAL VENT. TOLERATING SETTINGS WELL. NO S/S OF RESPIRATORY DISTRESS. NO FACIAL 
GRIMACES OR MOANING NOTED. HOB ELEVATED. ON CONTACT AND ASPIRATION PRECAUTION. ON TELE 
MONITOR W/ ST @ 109 W/ ELEVATED T WAVES. ON GTF TOLERATING WELL W/ NO RESIDUAL NOTED. 
INCONTINENT OF B/B. IV RT. HAND G 24 PATENT AND INTACT W/ NO S/S OF INFECTION/INFILTRATION 
NOTED. BED LOCKED AND IN LOW POSITION. CALL LIGHT W/ REACH. ALL NEEDS MEET AND ATTENDED. 
WILL CONTINUE TO MONITOR.

## 2018-05-17 NOTE — NUR
RN NOTES



PATIENT'S STRIP READING ON TELE SHOWS ST WITH T-WAVE ELEVATION, WITH HR IN THE 200s (WITH 
THE TELE MACHINE READING AND INCLUDING THE T-WAVE AS PART OF THE QRS COMPLEXES). PATIENT'S 
PALPABLE RADIAL PULSE , PATIENT WITH NO COMPLAINTS, NOT IN ANY DISTRESS. PATIENT'S 
LAST LAB DRAWN WAS ON 5/16/18 WITH K=4.4, Mg=1.8. STRIP READING SENT TO KENNA KAUR NP, 
MADE AWARE OF THE PATIENT'S CONDITION.

ORDER FOR EKG AND BMP, MG RECEIVED. NOTED AND CARRIED OUT. PRIMARY RN, TYRELL, MADE AWARE.

## 2018-05-17 NOTE — NUR
TELE RN CLOSING NOTE 



PT IS IN BED RESTING. NO SIGNS OF SOB OR DISTRESS, TOLERATING VENT SETTINGS WELL. G-TUBE IS 
INTACT WITH FEEDING AT 50 ML/HR.NO ACUTE CHANGES THROUGHOUT THE SHIFT. ALL NEEDS WERE 
ANTICIPATED AND MET. BED IS IN LOW AND LOCKED POSITION, CALL LIGHT WITHIN REACH. WILL 
ENDORSE TO DAYSHIFT.

## 2018-05-17 NOTE — NUR
RN NOTES



EKG SHOWING , NONSPECIFIC ST AND T-WAVE ABNORMALITY. K=4.1, Mg=1.6, Xl=161. KENNA KAUR NP, MADE AWARE. NEW ORDER OBTAINED FOR 2MG MAGNESIUM IV, NOTED AND CARRIED OUT. 
PRIMARY RN MADE AWARE.

## 2018-05-17 NOTE — NUR
TELE/RN NOTES



NP LEILANI SEPULVEDA AT BEDSIDE DISCUSSING PATIENT PLAN OF CARE. PATIENT HAVING FREQUENT LOOSE 
BOWEL MOVEMENTS,  ORDERS RECEIVED FOR C.DIFF STOOL AND KUB ABDOMEN. ORDERS CARRIED OUT AS 
INDICATED.

## 2018-05-17 NOTE — NUR
TELE RN OPENING NOTES



RECEIVED PATIENT IN BED ALERT AND ORIENTED X3, ON TRACH WITH VENT SETTINGS AC 16, , 
FI02 35%, PEEP 0. ABLE TO MOUTH WORDS & MAKE NEEDS KNOWN ON TELE MONITOR PER READING SINUS 
TACH, . RT AT BEDSIDE PROVIDING RESPIRATORY CARE, SUCTION.  IV TO LEFT FOREARM #18 
INTACT & PATENT, GTF JEVITY 1.2 @ 50 ML/HR, RESIDUAL CHECKED. PATIENT COMPLAINTS OF NAUSEA, 
SMALL WHITE SPUTUM SECRETIONS NOTED, SAFETY MEASURES IN PLACE & CALL LIGHT WITHIN REACH. 
WILL CONTINUE TO MONITOR.

## 2018-05-17 NOTE — NUR
TELE/RN NOTES



PATIENT RESTING IN BED, AWAKE AND ORIENTED X3, FAMILY AT BEDSIDE. PATIENT IN NO APPARENT 
DISTRESS,  REMAINED STABLE THROUGHOUT SHIFT, NO SIGNIFICANT CHANGES, PATIENT KEPT CLEAN AND 
COMFORTABLE. SKIN AND HYGIENE CARE PROVIDED, CLEANED AND REPOSITIONED. RT PROVIDED BREATHING 
TREATMENT AND SUCTIONED AS NEEDED, VENT SETTING TOLERATED WELL. CONTINUED WITH GT FEEDINGS, 
NO RESIDUAL NOTED. SEEN BY GI AND WOUND SURGEON. SAFETY MEASURES RENDERED, CALL LIGHT PLACED 
WITHIN REACH, WILL ENDORSE CARE TO NIGHT SHIFT FOR PHONG.

## 2018-05-17 NOTE — NUR
RT NOTE



PT IN STABLE CONDITION. PT IS MECHANICALLY VENTILATED VIA PORTEX 7 CUFFED TRACHEOSTOMY TUBE. 
SETTINGS ON VENT AS PRESCRIBED. ALARMS SET PER PROTOCOL AND AUDIBLE. TRACH MIDLINE AND 
SECURE. CUFF INFLATED BY MLT. PT AWAKE AND ALERT. NO DISTRESS NOTED. AMBU BAG AT BED SIDE. 
VENT PLUGGED IN TO RED OUTLET. WILL CONTINUE TO MONITOR.

## 2018-05-17 NOTE — NUR
TELE/RN NOTES



URINE CULTURE OBTAINED VIA FARFAN CATH AND SENT TO LAB FOR RESULTS. WILL F/U AS ORDERED,

## 2018-05-18 VITALS — SYSTOLIC BLOOD PRESSURE: 99 MMHG | DIASTOLIC BLOOD PRESSURE: 46 MMHG

## 2018-05-18 VITALS — SYSTOLIC BLOOD PRESSURE: 96 MMHG | DIASTOLIC BLOOD PRESSURE: 54 MMHG

## 2018-05-18 VITALS — SYSTOLIC BLOOD PRESSURE: 114 MMHG | DIASTOLIC BLOOD PRESSURE: 67 MMHG

## 2018-05-18 VITALS — SYSTOLIC BLOOD PRESSURE: 132 MMHG | DIASTOLIC BLOOD PRESSURE: 67 MMHG

## 2018-05-18 VITALS — SYSTOLIC BLOOD PRESSURE: 93 MMHG | DIASTOLIC BLOOD PRESSURE: 57 MMHG

## 2018-05-18 VITALS — SYSTOLIC BLOOD PRESSURE: 98 MMHG | DIASTOLIC BLOOD PRESSURE: 54 MMHG

## 2018-05-18 RX ADMIN — Medication PRN EACH: at 08:49

## 2018-05-18 RX ADMIN — ATORVASTATIN CALCIUM SCH MG: 10 TABLET, FILM COATED ORAL at 21:29

## 2018-05-18 RX ADMIN — FOLIC ACID SCH MG: 1 TABLET ORAL at 08:45

## 2018-05-18 RX ADMIN — Medication PRN EACH: at 20:56

## 2018-05-18 RX ADMIN — PANTOPRAZOLE SODIUM SCH MG: 40 GRANULE, DELAYED RELEASE ORAL at 08:45

## 2018-05-18 RX ADMIN — LEVALBUTEROL HYDROCHLORIDE SCH MG: 1.25 SOLUTION, CONCENTRATE RESPIRATORY (INHALATION) at 07:54

## 2018-05-18 RX ADMIN — ENOXAPARIN SODIUM SCH MG: 30 INJECTION SUBCUTANEOUS at 08:45

## 2018-05-18 RX ADMIN — METOPROLOL TARTRATE SCH MG: 25 TABLET, FILM COATED ORAL at 20:29

## 2018-05-18 RX ADMIN — Medication PRN EACH: at 02:45

## 2018-05-18 RX ADMIN — SIMETHICONE SCH MG: 20 SUSPENSION/ DROPS ORAL at 08:46

## 2018-05-18 RX ADMIN — SIMETHICONE SCH MG: 20 SUSPENSION/ DROPS ORAL at 16:41

## 2018-05-18 RX ADMIN — Medication SCH GM: at 13:59

## 2018-05-18 RX ADMIN — LINEZOLID SCH MG: 600 TABLET, FILM COATED ORAL at 20:10

## 2018-05-18 RX ADMIN — ZOLPIDEM TARTRATE PRN MG: 5 TABLET, FILM COATED ORAL at 23:35

## 2018-05-18 RX ADMIN — OXYCODONE HYDROCHLORIDE AND ACETAMINOPHEN SCH MG: 500 TABLET ORAL at 16:41

## 2018-05-18 RX ADMIN — Medication PRN EACH: at 16:40

## 2018-05-18 RX ADMIN — RIFAXIMIN SCH MG: 550 TABLET ORAL at 08:45

## 2018-05-18 RX ADMIN — RIFAXIMIN SCH MG: 550 TABLET ORAL at 16:41

## 2018-05-18 RX ADMIN — Medication SCH EACH: at 08:45

## 2018-05-18 RX ADMIN — Medication SCH GM: at 20:09

## 2018-05-18 RX ADMIN — Medication PRN ML: at 06:23

## 2018-05-18 RX ADMIN — FUROSEMIDE SCH MG: 20 TABLET ORAL at 08:46

## 2018-05-18 RX ADMIN — SIMETHICONE SCH MG: 20 SUSPENSION/ DROPS ORAL at 20:09

## 2018-05-18 RX ADMIN — METOPROLOL TARTRATE SCH MG: 25 TABLET, FILM COATED ORAL at 08:46

## 2018-05-18 RX ADMIN — SIMETHICONE SCH MG: 20 SUSPENSION/ DROPS ORAL at 12:09

## 2018-05-18 RX ADMIN — Medication SCH EACH: at 16:41

## 2018-05-18 RX ADMIN — LEVALBUTEROL HYDROCHLORIDE SCH MG: 1.25 SOLUTION, CONCENTRATE RESPIRATORY (INHALATION) at 15:50

## 2018-05-18 RX ADMIN — LEVALBUTEROL HYDROCHLORIDE SCH MG: 1.25 SOLUTION, CONCENTRATE RESPIRATORY (INHALATION) at 23:03

## 2018-05-18 RX ADMIN — LINEZOLID SCH MG: 600 TABLET, FILM COATED ORAL at 08:45

## 2018-05-18 NOTE — NUR
RN TELE NOTE 



PATIENT PRESENTS AOX3, TRACH TO MECHANICAL VENT ON SETTINGS AS ORDERED, ABLE TO MOUTH WORDS, 
INCONTINENT DIAPER IN PLACE, SKIN KEPT CLEAN AND DRY, TELE ,  CONTACT PRECAUTIONS 
OBSERVED FOR ESBL URINE, NO CARDIAC OR RESPIRATORY DISTRESS NOTED, R HAND #24 SL, SAFETY 
MAINTAINED AT ALL TIMES, CALL LIGHT WITHIN REACH, BED IN LOW LOCKED POSITION, DUE TO D/C 
TOMORROW, WILL CONTINUE TO MONITOR FOR ANY CHANGES IN CONDITION.

## 2018-05-18 NOTE — NUR
RYLEE NOTES

US OF ABDOMEN SHOWS GALLSTONES, GI MINI DUKE NOTIFIED.

-------------------------------------------------------------------------------

Addendum: 05/18/18 at 1756 by JOHN RICARDO RN

-------------------------------------------------------------------------------

RYLEE NOTES

US OF ABDOMEN SHOWS GALLSTONES, GI MINI DUKE AND ROSALBA NOTIFIED. PER ROSALBA BAILON TO CONT GTF.

## 2018-05-18 NOTE — NUR
TELE1/RN     G TUBE DISLODGE



G-TUBE NOTED CAME OFF STOMA, THE BALLOON OF THE G-TUBE POPPED, NOTIFIED CHARGE NURSE, 
EXISTING G-TUBE RE-INSERTED TO KEEP STOMA OPEN.  



GI MD NOTIFIED FOR RE-INSERTION.  MONITORING CONTINUED.

## 2018-05-18 NOTE — NUR
TELE1/RN     TRANSFER OF CARE



REPORT GIVEN TO NURSE BUCKLEY, ENDORSED TO CONTINUE CARE.  CL WITHIN REACHED, SAFETY 
MAINTAINED AND ISOLATION OBSERVED.



MONITORING CONTINUED.

## 2018-05-18 NOTE — NUR
TELE/RN NOTES:



BP LEFT THIGH 98/54, HEART RATE 106

BP RIGHT THIGH 86/40 , 102

BP LEFT ARM 88/52, 104. 



TEXTED NP KENNA KAUR W/ NNO AT THIS TIME. WILL CONTINUE TO MONITOR. CHARGE NURSE MADE 
AWARE OF BP READING.

## 2018-05-18 NOTE — NUR
WOUND CARE CONSULT  WOUND CARE RECEIVED WOUND CONSULT FOR LABIAL WOUND.  WOUND CARE WILL 
DEFER CONSULT AND TREATMENT PLAN TO SURGICAL TEAM WHO ARE CURRENTLY FOLLOWING PATIENT.  
PATIENT WITH ANTONIETTA AT 13, ALL PRESSURE ULCER PREVENTION MEASURES ARE NOTED TO BE IN PLACE.

## 2018-05-18 NOTE — NUR
TELE1/RN     AM SHIFT INITIAL NOTES



RECEIVED AT AWAKEN SITTING IN BED, NO ACUTE RESPIRATORY DISTRESS NOTED.  PT A/O X 3, ABLE TO 
MOUTH WORDS, DENIES ANY SYMPTOMS AT THIS TIME. ON VENTILATOR SUPPORT WITH RATES SET AS 
PRESCRIBED, SATURATING @ 100%, LUNG SOUNDS CLEAR, SUCTIONED FOR AIRWAY CLEARANCE.  ON TELE 
WITH SINUS TACHY, .  IV SITE FLUSHED, PATENT WITH NO S/S OF INFECTION, SL. WITH ON 
GOING GT FEEDING @ 50CC/HR, NO GASTRIC RESIDUAL NOTED, FLUSHED. PT IS COMFORTABLE AT THIS 
TIME. SCHEDULED AM MEDS TO BE GIVEN.  CL WITHIN REACHED, SAFETY MAINTAINED AND ISOLATION 
OBSERVED.  ON GOING MONITORING.

## 2018-05-18 NOTE — NUR
RN NOTES

PT REMAINED IN STABLE CONDITION THROUGHOUT THE SHIFT, NO SIGNIFICANT CHANGES NOTED. WILL 
ENDORSE TO ONCOMING SHIFT.

## 2018-05-18 NOTE — NUR
RN NOTES

RECEIVED PT FROM OLIVIA FOR CONTINUITY OF CARE. VENT/TRACH DEPENDENT TOLERATING VENT 
SETTINGS. ST ON THE TELE [place]. PENDING GTUBE REPLACEMENT. RH 24G IV SITE INTACT. BED LOCKED 
AND IN LOWEST POSITION, CALL LIGHT WITHIN REACH, SIDE RAILS UPX3, WILL CONT TO [place].

## 2018-05-19 VITALS — DIASTOLIC BLOOD PRESSURE: 55 MMHG | SYSTOLIC BLOOD PRESSURE: 108 MMHG

## 2018-05-19 VITALS — SYSTOLIC BLOOD PRESSURE: 115 MMHG | DIASTOLIC BLOOD PRESSURE: 61 MMHG

## 2018-05-19 VITALS — SYSTOLIC BLOOD PRESSURE: 112 MMHG | DIASTOLIC BLOOD PRESSURE: 62 MMHG

## 2018-05-19 VITALS — SYSTOLIC BLOOD PRESSURE: 101 MMHG | DIASTOLIC BLOOD PRESSURE: 62 MMHG

## 2018-05-19 VITALS — SYSTOLIC BLOOD PRESSURE: 101 MMHG | DIASTOLIC BLOOD PRESSURE: 54 MMHG

## 2018-05-19 VITALS — DIASTOLIC BLOOD PRESSURE: 46 MMHG | SYSTOLIC BLOOD PRESSURE: 103 MMHG

## 2018-05-19 LAB
BASOPHILS # BLD AUTO: 0 /CMM (ref 0–0.2)
BASOPHILS NFR BLD AUTO: 0.5 % (ref 0–2)
BUN SERPL-MCNC: 24 MG/DL (ref 7–18)
CALCIUM SERPL-MCNC: 8.6 MG/DL (ref 8.5–10.1)
CHLORIDE SERPL-SCNC: 101 MMOL/L (ref 98–107)
CO2 SERPL-SCNC: 32 MMOL/L (ref 21–32)
CREAT SERPL-MCNC: 0.7 MG/DL (ref 0.6–1.3)
EOSINOPHIL NFR BLD AUTO: 1.3 % (ref 0–6)
GLUCOSE SERPL-MCNC: 109 MG/DL (ref 74–106)
HCT VFR BLD AUTO: 30 % (ref 33–45)
HGB BLD-MCNC: 9.4 G/DL (ref 11.5–14.8)
LYMPHOCYTES NFR BLD AUTO: 1.4 /CMM (ref 0.8–4.8)
LYMPHOCYTES NFR BLD AUTO: 17.6 % (ref 20–44)
MAGNESIUM SERPL-MCNC: 1.4 MG/DL (ref 1.8–2.4)
MCHC RBC AUTO-ENTMCNC: 31 G/DL (ref 31–36)
MCV RBC AUTO: 83 FL (ref 82–100)
MONOCYTES NFR BLD AUTO: 0.5 /CMM (ref 0.1–1.3)
MONOCYTES NFR BLD AUTO: 5.8 % (ref 2–12)
NEUTROPHILS # BLD AUTO: 6 /CMM (ref 1.8–8.9)
NEUTROPHILS NFR BLD AUTO: 74.8 % (ref 43–81)
PHOSPHATE SERPL-MCNC: 4 MG/DL (ref 2.5–4.9)
PLATELET # BLD AUTO: 174 /CMM (ref 150–450)
POTASSIUM SERPL-SCNC: 4.5 MMOL/L (ref 3.5–5.1)
RBC # BLD AUTO: 3.61 MIL/UL (ref 4–5.2)
RDW COEFFICIENT OF VARIATION: 18 (ref 11.5–15)
SODIUM SERPL-SCNC: 137 MMOL/L (ref 136–145)
WBC NRBC COR # BLD AUTO: 8.1 K/UL (ref 4.3–11)

## 2018-05-19 PROCEDURE — 0D20XUZ CHANGE FEEDING DEVICE IN UPPER INTESTINAL TRACT, EXTERNAL APPROACH: ICD-10-PCS | Performed by: NURSE PRACTITIONER

## 2018-05-19 RX ADMIN — Medication PRN EACH: at 01:30

## 2018-05-19 RX ADMIN — Medication SCH EACH: at 08:17

## 2018-05-19 RX ADMIN — Medication PRN EACH: at 12:55

## 2018-05-19 RX ADMIN — ATORVASTATIN CALCIUM SCH MG: 10 TABLET, FILM COATED ORAL at 21:39

## 2018-05-19 RX ADMIN — OXYCODONE HYDROCHLORIDE AND ACETAMINOPHEN SCH MG: 500 TABLET ORAL at 17:20

## 2018-05-19 RX ADMIN — Medication PRN EACH: at 21:45

## 2018-05-19 RX ADMIN — DEXTROSE MONOHYDRATE PRN MG: 50 INJECTION, SOLUTION INTRAVENOUS at 23:34

## 2018-05-19 RX ADMIN — SIMETHICONE SCH MG: 20 SUSPENSION/ DROPS ORAL at 08:19

## 2018-05-19 RX ADMIN — RIFAXIMIN SCH MG: 550 TABLET ORAL at 17:20

## 2018-05-19 RX ADMIN — LEVALBUTEROL HYDROCHLORIDE SCH MG: 1.25 SOLUTION, CONCENTRATE RESPIRATORY (INHALATION) at 21:34

## 2018-05-19 RX ADMIN — MAGNESIUM SULFATE IN DEXTROSE SCH MLS/HR: 10 INJECTION, SOLUTION INTRAVENOUS at 10:30

## 2018-05-19 RX ADMIN — MAGNESIUM SULFATE IN DEXTROSE SCH MLS/HR: 10 INJECTION, SOLUTION INTRAVENOUS at 11:31

## 2018-05-19 RX ADMIN — Medication SCH GM: at 08:19

## 2018-05-19 RX ADMIN — METOPROLOL TARTRATE SCH MG: 25 TABLET, FILM COATED ORAL at 08:18

## 2018-05-19 RX ADMIN — MAGNESIUM SULFATE IN DEXTROSE SCH MLS/HR: 10 INJECTION, SOLUTION INTRAVENOUS at 09:17

## 2018-05-19 RX ADMIN — SIMETHICONE SCH MG: 20 SUSPENSION/ DROPS ORAL at 21:45

## 2018-05-19 RX ADMIN — Medication PRN ML: at 20:02

## 2018-05-19 RX ADMIN — LEVALBUTEROL HYDROCHLORIDE SCH MG: 1.25 SOLUTION, CONCENTRATE RESPIRATORY (INHALATION) at 15:04

## 2018-05-19 RX ADMIN — MAGNESIUM SULFATE IN DEXTROSE SCH MLS/HR: 10 INJECTION, SOLUTION INTRAVENOUS at 08:17

## 2018-05-19 RX ADMIN — SIMETHICONE SCH MG: 20 SUSPENSION/ DROPS ORAL at 17:21

## 2018-05-19 RX ADMIN — Medication SCH EACH: at 17:19

## 2018-05-19 RX ADMIN — LINEZOLID SCH MG: 600 TABLET, FILM COATED ORAL at 08:17

## 2018-05-19 RX ADMIN — METOPROLOL TARTRATE SCH MG: 25 TABLET, FILM COATED ORAL at 21:00

## 2018-05-19 RX ADMIN — LEVALBUTEROL HYDROCHLORIDE SCH MG: 1.25 SOLUTION, CONCENTRATE RESPIRATORY (INHALATION) at 07:48

## 2018-05-19 RX ADMIN — RIFAXIMIN SCH MG: 550 TABLET ORAL at 08:17

## 2018-05-19 RX ADMIN — ENOXAPARIN SODIUM SCH MG: 30 INJECTION SUBCUTANEOUS at 08:21

## 2018-05-19 RX ADMIN — Medication SCH GM: at 17:21

## 2018-05-19 RX ADMIN — PANTOPRAZOLE SODIUM SCH MG: 40 GRANULE, DELAYED RELEASE ORAL at 08:18

## 2018-05-19 RX ADMIN — FUROSEMIDE SCH MG: 20 TABLET ORAL at 08:18

## 2018-05-19 RX ADMIN — LINEZOLID SCH MG: 600 TABLET, FILM COATED ORAL at 21:39

## 2018-05-19 RX ADMIN — SIMETHICONE SCH MG: 20 SUSPENSION/ DROPS ORAL at 13:30

## 2018-05-19 RX ADMIN — SIMETHICONE PRN MG: 20 SUSPENSION/ DROPS ORAL at 08:19

## 2018-05-19 RX ADMIN — LORAZEPAM PRN MG: 1 TABLET ORAL at 20:02

## 2018-05-19 RX ADMIN — FOLIC ACID SCH MG: 1 TABLET ORAL at 08:17

## 2018-05-19 RX ADMIN — Medication PRN EACH: at 17:20

## 2018-05-19 RX ADMIN — Medication PRN EACH: at 08:17

## 2018-05-19 NOTE — NUR
PT RECEIVED TRACH ON VENT PTX 7. PT IS AWAKE AND FOLLOWS COMMANDS. NO RESP DISTRESS. PT 
TOLERATING VENT SETTINGS. SX'D FOR MOD AMT OF THICK TAN SECRETIONS. VENT ALARMS SET AND 
AUDIBLE. AMBU BAG AT BEDSIDE. VENT PLUGGED INTO RED OUTLET. WILL CONTINUE TO MONITOR.

-------------------------------------------------------------------------------

Addendum: 05/19/18 at 2150 by BAILEY ROMERO RT

-------------------------------------------------------------------------------

Amended: Links added.

## 2018-05-19 NOTE — NUR
TELE RN INITIAL NOTES

RECEIVED PATIENT IN BED, AOX4, ON VENTILATOR SETTINGS AS ORDERED TOLERATING WELL, SUCTIONED, 
WHITE THICK SECRETIONS, ON TELE MONITORING ST, IN DIAPER, G-TUBE FEEDINDS 50ML/HR, NO 
RESIDUAL NOTED, IV R HAND 24G SL, CLEAN AND PATENT, MAGNESIUM LEVEL 1.4 THIS AM, NP ROSALBA MEAD MADE AWARE, BED IN LOW AND LOCKED POSITION, CALL LIGHT WITHIN REACH, WILL CONTINUE 
TO MONITOR.

## 2018-05-19 NOTE — NUR
TELE/RN OPENING NOTES



PT RECEIVED AWAKE, HOB 45 DEGREES. FAMILY AT BEDSIDE. VENT SETTINGS NOTED. ABLE TO MOUTH 
WORDS AND MAKE NEEDS KNOWN. IV TO RIGHT HAND PATENT AND INTACT RUNNING IVF AS ORDERED, ONLY 
TO COMPLETE 1L. GTF RUNNING AS ORDERED. 15ML RESIDUALS NOTED. PT ANXIOUS, OFFERED PRN 
ATIVAN, PT ACCEPTED. WILL ADMINISTER. ON TELE MONITOR SHOWING SINUS TACH WITH . BED IN 
LOW/LOCKED POSITION WITH CALL LIGHT IN REACH. SIDE RAILS UPX2 WILL CONTINUE TO MONITOR

## 2018-05-19 NOTE — NUR
TELE RN END NOTES

PATIENT RESTING IN BED, ALL NEEDS MET, PATIENT CLEANED AND REPOSITIONED,  AT BEDSIDE, 
PAIN MEDICATIONS GIVEN, SUCTIONED THICK SECRETIONS, WILL ENDORSE TO NIGHT SHIFT FOR 
CONTINUITY OF CARE.

## 2018-05-19 NOTE — NUR
Awake and alert pt received on mechanical vent.  Pt trach is secure.  Pt tolerated current 
vent settings well.  No changes made. Vent is plugged into a red outlet, alarms are set and 
audible, and BVM is at bedside.

-------------------------------------------------------------------------------

Addendum: 05/19/18 at 1728 by ISIDRO DUTTA RT

-------------------------------------------------------------------------------

Amended: Links added.

## 2018-05-20 VITALS — DIASTOLIC BLOOD PRESSURE: 65 MMHG | SYSTOLIC BLOOD PRESSURE: 103 MMHG

## 2018-05-20 VITALS — SYSTOLIC BLOOD PRESSURE: 115 MMHG | DIASTOLIC BLOOD PRESSURE: 63 MMHG

## 2018-05-20 VITALS — DIASTOLIC BLOOD PRESSURE: 63 MMHG | SYSTOLIC BLOOD PRESSURE: 118 MMHG

## 2018-05-20 VITALS — DIASTOLIC BLOOD PRESSURE: 55 MMHG | SYSTOLIC BLOOD PRESSURE: 115 MMHG

## 2018-05-20 VITALS — DIASTOLIC BLOOD PRESSURE: 63 MMHG | SYSTOLIC BLOOD PRESSURE: 111 MMHG

## 2018-05-20 VITALS — SYSTOLIC BLOOD PRESSURE: 100 MMHG | DIASTOLIC BLOOD PRESSURE: 63 MMHG

## 2018-05-20 LAB
BASOPHILS # BLD AUTO: 0 /CMM (ref 0–0.2)
BASOPHILS NFR BLD AUTO: 0.4 % (ref 0–2)
BUN SERPL-MCNC: 16 MG/DL (ref 7–18)
CALCIUM SERPL-MCNC: 8.6 MG/DL (ref 8.5–10.1)
CHLORIDE SERPL-SCNC: 103 MMOL/L (ref 98–107)
CO2 SERPL-SCNC: 32 MMOL/L (ref 21–32)
CREAT SERPL-MCNC: 0.6 MG/DL (ref 0.6–1.3)
EOSINOPHIL NFR BLD AUTO: 2 % (ref 0–6)
GLUCOSE SERPL-MCNC: 85 MG/DL (ref 74–106)
HCT VFR BLD AUTO: 28 % (ref 33–45)
HGB BLD-MCNC: 8.8 G/DL (ref 11.5–14.8)
LYMPHOCYTES NFR BLD AUTO: 1 /CMM (ref 0.8–4.8)
LYMPHOCYTES NFR BLD AUTO: 14 % (ref 20–44)
MAGNESIUM SERPL-MCNC: 2 MG/DL (ref 1.8–2.4)
MCHC RBC AUTO-ENTMCNC: 31 G/DL (ref 31–36)
MCV RBC AUTO: 82 FL (ref 82–100)
MONOCYTES NFR BLD AUTO: 0.3 /CMM (ref 0.1–1.3)
MONOCYTES NFR BLD AUTO: 4 % (ref 2–12)
NEUTROPHILS # BLD AUTO: 5.5 /CMM (ref 1.8–8.9)
NEUTROPHILS NFR BLD AUTO: 79.6 % (ref 43–81)
PHOSPHATE SERPL-MCNC: 3.1 MG/DL (ref 2.5–4.9)
PLATELET # BLD AUTO: 130 /CMM (ref 150–450)
POTASSIUM SERPL-SCNC: 4.4 MMOL/L (ref 3.5–5.1)
RBC # BLD AUTO: 3.41 MIL/UL (ref 4–5.2)
RDW COEFFICIENT OF VARIATION: 17.6 (ref 11.5–15)
SODIUM SERPL-SCNC: 139 MMOL/L (ref 136–145)
WBC NRBC COR # BLD AUTO: 6.9 K/UL (ref 4.3–11)

## 2018-05-20 RX ADMIN — LINEZOLID SCH MG: 600 TABLET, FILM COATED ORAL at 09:15

## 2018-05-20 RX ADMIN — FOLIC ACID SCH MG: 1 TABLET ORAL at 09:15

## 2018-05-20 RX ADMIN — ATORVASTATIN CALCIUM SCH MG: 10 TABLET, FILM COATED ORAL at 21:00

## 2018-05-20 RX ADMIN — Medication SCH EACH: at 17:55

## 2018-05-20 RX ADMIN — Medication PRN EACH: at 19:48

## 2018-05-20 RX ADMIN — RIFAXIMIN SCH MG: 550 TABLET ORAL at 17:55

## 2018-05-20 RX ADMIN — METOPROLOL TARTRATE SCH MG: 25 TABLET, FILM COATED ORAL at 09:15

## 2018-05-20 RX ADMIN — METOPROLOL TARTRATE SCH MG: 25 TABLET, FILM COATED ORAL at 20:14

## 2018-05-20 RX ADMIN — ZOLPIDEM TARTRATE PRN MG: 5 TABLET, FILM COATED ORAL at 23:27

## 2018-05-20 RX ADMIN — SIMETHICONE SCH MG: 20 SUSPENSION/ DROPS ORAL at 13:00

## 2018-05-20 RX ADMIN — SIMETHICONE SCH MG: 20 SUSPENSION/ DROPS ORAL at 17:00

## 2018-05-20 RX ADMIN — PANTOPRAZOLE SODIUM SCH MG: 40 GRANULE, DELAYED RELEASE ORAL at 09:15

## 2018-05-20 RX ADMIN — ACETAMINOPHEN PRN MG: 325 TABLET ORAL at 18:10

## 2018-05-20 RX ADMIN — LORAZEPAM PRN MG: 1 TABLET ORAL at 15:26

## 2018-05-20 RX ADMIN — LINEZOLID SCH MG: 600 TABLET, FILM COATED ORAL at 20:08

## 2018-05-20 RX ADMIN — Medication SCH EACH: at 09:15

## 2018-05-20 RX ADMIN — SIMETHICONE SCH MG: 20 SUSPENSION/ DROPS ORAL at 09:00

## 2018-05-20 RX ADMIN — LEVALBUTEROL HYDROCHLORIDE SCH MG: 1.25 SOLUTION, CONCENTRATE RESPIRATORY (INHALATION) at 07:24

## 2018-05-20 RX ADMIN — Medication PRN EACH: at 02:08

## 2018-05-20 RX ADMIN — OXYCODONE HYDROCHLORIDE AND ACETAMINOPHEN SCH MG: 500 TABLET ORAL at 17:55

## 2018-05-20 RX ADMIN — Medication SCH GM: at 17:00

## 2018-05-20 RX ADMIN — LEVALBUTEROL HYDROCHLORIDE SCH MG: 1.25 SOLUTION, CONCENTRATE RESPIRATORY (INHALATION) at 15:02

## 2018-05-20 RX ADMIN — LEVALBUTEROL HYDROCHLORIDE SCH MG: 1.25 SOLUTION, CONCENTRATE RESPIRATORY (INHALATION) at 23:50

## 2018-05-20 RX ADMIN — Medication SCH GM: at 09:19

## 2018-05-20 RX ADMIN — FUROSEMIDE SCH MG: 20 TABLET ORAL at 09:15

## 2018-05-20 RX ADMIN — RIFAXIMIN SCH MG: 550 TABLET ORAL at 09:15

## 2018-05-20 RX ADMIN — Medication PRN EACH: at 15:26

## 2018-05-20 RX ADMIN — Medication PRN EACH: at 06:08

## 2018-05-20 RX ADMIN — SIMETHICONE SCH MG: 20 SUSPENSION/ DROPS ORAL at 20:26

## 2018-05-20 RX ADMIN — ENOXAPARIN SODIUM SCH MG: 30 INJECTION SUBCUTANEOUS at 09:17

## 2018-05-20 NOTE — NUR
TELE/RN OPENING NOTES



PT RECEIVED AWAKE, HOB 45 DEGREES. FAMILY AT BEDSIDE. VENT SETTINGS NOTED. ABLE TO MOUTH 
WORDS AND MAKE NEEDS KNOWN. IV TO RIGHT HAND PATENT AND INTACT RUNNING IVF AS ORDERED, ONLY 
TO COMPLETE 1L. GTF RUNNING AS ORDERED. 15ML RESIDUALS NOTED. PT ANXIOUS, OFFERED PRN 
ATIVAN, PT ACCEPTED. WILL ADMINISTER. ON TELE MONITOR SHOWING SINUS TACH WITH HR 99. BED IN 
LOW/LOCKED POSITION WITH CALL LIGHT IN REACH. SIDE RAILS UPX2 WILL CONTINUE TO MONITOR

## 2018-05-20 NOTE — NUR
PT RECEIVED TRACH ON VENT PTX 7. PT IS AWAKE AND FOLLOWS COMMANDS. NO RESP DISTRESS. PT 
TOLERATING VENT SETTINGS. SX'D FOR MOD AMT OF THICK TAN SECRETIONS. VENT ALARMS SET AND 
AUDIBLE. AMBU BAG AT BEDSIDE. VENT PLUGGED INTO RED OUTLET. WILL CONTINUE TO MONITOR.

-------------------------------------------------------------------------------

Addendum: 05/20/18 at 2047 by BAILEY ROMERO RT

-------------------------------------------------------------------------------

Amended: Links added.

## 2018-05-20 NOTE — NUR
TELE/RN CLOSING NOTES



PT AWAKE, RESTING IN BED. FOWLERS POSITION. VENT DEPENDENT. SUCTIONED PRN. BREATHING EVEN, 
LABORED. DENIES SOB AT THIS TIME. PAIN MEDICATION PROVIDED AS ORDERED. ON TELE MONITOR 
SHOWING . ABLE TO MAKE NEEDS KNOWN. GT FEEDING RUNNING AT 50ML/HR. APPROX 15ML 
RESIDUALS NOTED. IV TO LFA AND RIGHT WRIST PATENT AND INTACT. KEPT CLEAN AND DRY. WOUND CARE 
PROVIDED, TURNED/REPOSITIONED Q2H. HEELS OFFLOADED. KEPT PT COMFORTABLE DURING SHIFT. BED 
REMAINS IN LOW/LOCKED POSITION WITH CALL LIGHT IN REACH. SIDE RAILS UPX2. LAB UNABLE TO DRAW 
LABS THIS MORNING. WILL COME BACK TO REDRAW. WILL ENDORSE TO DAY SHIFT RN PHOGN.

## 2018-05-21 VITALS — SYSTOLIC BLOOD PRESSURE: 110 MMHG | DIASTOLIC BLOOD PRESSURE: 61 MMHG

## 2018-05-21 VITALS — DIASTOLIC BLOOD PRESSURE: 64 MMHG | SYSTOLIC BLOOD PRESSURE: 112 MMHG

## 2018-05-21 VITALS — SYSTOLIC BLOOD PRESSURE: 111 MMHG | DIASTOLIC BLOOD PRESSURE: 66 MMHG

## 2018-05-21 VITALS — SYSTOLIC BLOOD PRESSURE: 119 MMHG | DIASTOLIC BLOOD PRESSURE: 90 MMHG

## 2018-05-21 VITALS — SYSTOLIC BLOOD PRESSURE: 112 MMHG | DIASTOLIC BLOOD PRESSURE: 64 MMHG

## 2018-05-21 VITALS — SYSTOLIC BLOOD PRESSURE: 120 MMHG | DIASTOLIC BLOOD PRESSURE: 68 MMHG

## 2018-05-21 RX ADMIN — ATORVASTATIN CALCIUM SCH MG: 10 TABLET, FILM COATED ORAL at 20:44

## 2018-05-21 RX ADMIN — Medication PRN EACH: at 20:55

## 2018-05-21 RX ADMIN — Medication PRN EACH: at 16:52

## 2018-05-21 RX ADMIN — FOLIC ACID SCH MG: 1 TABLET ORAL at 08:34

## 2018-05-21 RX ADMIN — PANTOPRAZOLE SODIUM SCH MG: 40 GRANULE, DELAYED RELEASE ORAL at 08:33

## 2018-05-21 RX ADMIN — Medication SCH EACH: at 16:52

## 2018-05-21 RX ADMIN — LEVALBUTEROL HYDROCHLORIDE SCH MG: 1.25 SOLUTION, CONCENTRATE RESPIRATORY (INHALATION) at 23:20

## 2018-05-21 RX ADMIN — Medication PRN EACH: at 01:47

## 2018-05-21 RX ADMIN — LINEZOLID SCH MG: 600 TABLET, FILM COATED ORAL at 20:41

## 2018-05-21 RX ADMIN — LINEZOLID SCH MG: 600 TABLET, FILM COATED ORAL at 08:33

## 2018-05-21 RX ADMIN — SIMETHICONE PRN MG: 20 SUSPENSION/ DROPS ORAL at 08:41

## 2018-05-21 RX ADMIN — OXYCODONE HYDROCHLORIDE AND ACETAMINOPHEN SCH MG: 500 TABLET ORAL at 16:53

## 2018-05-21 RX ADMIN — SIMETHICONE SCH MG: 20 SUSPENSION/ DROPS ORAL at 12:01

## 2018-05-21 RX ADMIN — DEXTROSE MONOHYDRATE PRN MG: 50 INJECTION, SOLUTION INTRAVENOUS at 11:37

## 2018-05-21 RX ADMIN — Medication SCH GM: at 08:42

## 2018-05-21 RX ADMIN — Medication PRN EACH: at 11:17

## 2018-05-21 RX ADMIN — SIMETHICONE SCH MG: 20 SUSPENSION/ DROPS ORAL at 16:53

## 2018-05-21 RX ADMIN — LORAZEPAM PRN MG: 1 TABLET ORAL at 18:41

## 2018-05-21 RX ADMIN — DEXTROSE MONOHYDRATE PRN MG: 50 INJECTION, SOLUTION INTRAVENOUS at 09:53

## 2018-05-21 RX ADMIN — ACETAMINOPHEN PRN MG: 325 TABLET ORAL at 11:36

## 2018-05-21 RX ADMIN — SIMETHICONE SCH MG: 20 SUSPENSION/ DROPS ORAL at 09:33

## 2018-05-21 RX ADMIN — DEXTROSE MONOHYDRATE PRN MG: 50 INJECTION, SOLUTION INTRAVENOUS at 18:41

## 2018-05-21 RX ADMIN — ENOXAPARIN SODIUM SCH MG: 30 INJECTION SUBCUTANEOUS at 08:33

## 2018-05-21 RX ADMIN — METOPROLOL TARTRATE SCH MG: 25 TABLET, FILM COATED ORAL at 08:37

## 2018-05-21 RX ADMIN — Medication SCH EACH: at 08:33

## 2018-05-21 RX ADMIN — Medication PRN ML: at 05:39

## 2018-05-21 RX ADMIN — Medication SCH GM: at 16:53

## 2018-05-21 RX ADMIN — RIFAXIMIN SCH MG: 550 TABLET ORAL at 16:52

## 2018-05-21 RX ADMIN — RIFAXIMIN SCH MG: 550 TABLET ORAL at 08:33

## 2018-05-21 RX ADMIN — LEVALBUTEROL HYDROCHLORIDE SCH MG: 1.25 SOLUTION, CONCENTRATE RESPIRATORY (INHALATION) at 07:05

## 2018-05-21 RX ADMIN — LEVALBUTEROL HYDROCHLORIDE SCH MG: 1.25 SOLUTION, CONCENTRATE RESPIRATORY (INHALATION) at 15:32

## 2018-05-21 RX ADMIN — METOPROLOL TARTRATE SCH MG: 25 TABLET, FILM COATED ORAL at 20:39

## 2018-05-21 RX ADMIN — FUROSEMIDE SCH MG: 20 TABLET ORAL at 08:33

## 2018-05-21 RX ADMIN — SIMETHICONE SCH MG: 20 SUSPENSION/ DROPS ORAL at 20:41

## 2018-05-21 NOTE — NUR
TELE.RN OPENING NOTES.

PT A&0X3, NON VERBAL BUT MOUTHING WORDS AND ABLE TO MAKE NEEDS KNOWN. PT WITH T&V. PT WITH 
TELLE -093. SETTING REVIEWED AND CORRECT, PLUGGED TO RED POINT AND ALARMS ON. PT 
AUSCULTATE WITH CRACKLES AND RIGHT SIDE RHONCHI, SAO2 WNL. PT WITH RT NOW. PT DENIES PAIN. 
PT WITH G TUBE INTACT AND OPERATIONAL. PT WITH IVC AT L FA G#22 INTACT AND SALINE FLUSH 
PATENT. PT BRIEFED ON TODAY'S POC AND IS WITHOUT CONCERN OR COMPLAINT.

## 2018-05-21 NOTE — NUR
RT



PT RECEIVED TRACHED WITH A PORTEX 7 ON THE VENT WITH NOTED SETTINGS. PT IS AWAKE AND ALERT. 
VENT ALARMS ARE SET AND AUDIBLE WITH BVM BY BEDSIDE. MLT CUFF PRESSURE NOTED. VENT IS 
PLUGGED INTO RED OUTLET. NO RESPIRATORY DISTRESS NOTED AT THIS TIME, WILL CONTINUE TO 
MONITOR.

-------------------------------------------------------------------------------

Addendum: 05/21/18 at 1756 by LISSETTE BADILLO RT

-------------------------------------------------------------------------------

Amended: Links added.

## 2018-05-21 NOTE — NUR
PT RECEIVED TRACHED WITH A PORTEX 7 ON THE VENT WITH NOTED SETTINGS. PT IS AWAKE AND ALERT. 
VENT ALARMS ARE SET AND AUDIBLE ,MLT CUFF PRESSURE NOTED. VENT IS PLUGGED INTO RED OUTLET. 
SUCTIONED SMALL AMOUNT OF PALE YELLOW THICK SECRETIONS. NO RESPIRATORY DISTRESS NOTED AT 
THIS TIME, WILL CONTINUE TO MONITOR.

## 2018-05-21 NOTE — NUR
RN NOTES. PT WITH MULTIPLE EPISODES OF PAIN AT G-TUBE SITE. NAD AT SITE AND WITH MINIMAL 
RESIDUAL. NP AWARE.

## 2018-05-21 NOTE — NUR
RN CLOSING NOTES.

PT A&0X3, NON VERBAL BUT ABLE TO COMMUNICATE,  FAMILY AT BEDSIDE. PT WITH T&V SETTINGS 
CORRECT AND USING RED PLUG. PT WITH MODERATE PAIN AT G-TUBE SITE. PT WITH G TUBE INTACT AND 
OPERATIONAL, MINIMAL RESIDUALS. PT WITH NEW IVC AT L FA G#20 INTACT AND SALINE FLUSH PATENT. 
PT BED IN LOWEST LOCKED POSITION WITH HANDRAILSX4 AND CALL BELL WITHIN REACH. WOUND CARE 
COMPLETED AS ORDERED. PT REPOSITIONED Q2HR OR MORE FREQUENT. ALL DAY NURSE DUTIES ATTENDED 
TO AND PT AND  ARE WITHOUT CONCERN OR COMPLAINT AT THIS TIME. WILL ENDORSE TO NIGHT 
NURSE AT BEDSIDE FOR PHONG.

## 2018-05-22 VITALS — SYSTOLIC BLOOD PRESSURE: 106 MMHG | DIASTOLIC BLOOD PRESSURE: 69 MMHG

## 2018-05-22 VITALS — SYSTOLIC BLOOD PRESSURE: 132 MMHG | DIASTOLIC BLOOD PRESSURE: 63 MMHG

## 2018-05-22 VITALS — DIASTOLIC BLOOD PRESSURE: 51 MMHG | SYSTOLIC BLOOD PRESSURE: 132 MMHG

## 2018-05-22 VITALS — SYSTOLIC BLOOD PRESSURE: 111 MMHG | DIASTOLIC BLOOD PRESSURE: 69 MMHG

## 2018-05-22 VITALS — DIASTOLIC BLOOD PRESSURE: 64 MMHG | SYSTOLIC BLOOD PRESSURE: 118 MMHG

## 2018-05-22 VITALS — DIASTOLIC BLOOD PRESSURE: 55 MMHG | SYSTOLIC BLOOD PRESSURE: 107 MMHG

## 2018-05-22 RX ADMIN — FOLIC ACID SCH MG: 1 TABLET ORAL at 08:28

## 2018-05-22 RX ADMIN — LEVALBUTEROL HYDROCHLORIDE SCH MG: 1.25 SOLUTION, CONCENTRATE RESPIRATORY (INHALATION) at 07:28

## 2018-05-22 RX ADMIN — LINEZOLID SCH MG: 600 TABLET, FILM COATED ORAL at 08:28

## 2018-05-22 RX ADMIN — ENOXAPARIN SODIUM SCH MG: 30 INJECTION SUBCUTANEOUS at 08:30

## 2018-05-22 RX ADMIN — SIMETHICONE SCH MG: 20 SUSPENSION/ DROPS ORAL at 08:31

## 2018-05-22 RX ADMIN — Medication PRN EACH: at 06:38

## 2018-05-22 RX ADMIN — OXYCODONE HYDROCHLORIDE AND ACETAMINOPHEN SCH MG: 500 TABLET ORAL at 16:42

## 2018-05-22 RX ADMIN — METOPROLOL TARTRATE SCH MG: 25 TABLET, FILM COATED ORAL at 08:28

## 2018-05-22 RX ADMIN — Medication SCH EACH: at 08:28

## 2018-05-22 RX ADMIN — ACETAMINOPHEN PRN MG: 325 TABLET ORAL at 04:40

## 2018-05-22 RX ADMIN — SIMETHICONE SCH MG: 20 SUSPENSION/ DROPS ORAL at 12:08

## 2018-05-22 RX ADMIN — FUROSEMIDE SCH MG: 20 TABLET ORAL at 08:28

## 2018-05-22 RX ADMIN — Medication SCH GM: at 16:44

## 2018-05-22 RX ADMIN — Medication PRN EACH: at 02:34

## 2018-05-22 RX ADMIN — PANTOPRAZOLE SODIUM SCH MG: 40 GRANULE, DELAYED RELEASE ORAL at 08:27

## 2018-05-22 RX ADMIN — Medication SCH GM: at 08:30

## 2018-05-22 RX ADMIN — Medication PRN EACH: at 11:45

## 2018-05-22 RX ADMIN — RIFAXIMIN SCH MG: 550 TABLET ORAL at 08:27

## 2018-05-22 RX ADMIN — SIMETHICONE SCH MG: 20 SUSPENSION/ DROPS ORAL at 16:43

## 2018-05-22 RX ADMIN — RIFAXIMIN SCH MG: 550 TABLET ORAL at 16:42

## 2018-05-22 RX ADMIN — Medication PRN EACH: at 16:42

## 2018-05-22 RX ADMIN — LEVALBUTEROL HYDROCHLORIDE SCH MG: 1.25 SOLUTION, CONCENTRATE RESPIRATORY (INHALATION) at 16:04

## 2018-05-22 RX ADMIN — Medication SCH EACH: at 16:42

## 2018-05-22 NOTE — NUR
TELE RN NOTES



RECEIVED PT ON BED. ALERT ORIENTED [facility]. [facility]. ON TELE MONITOR 
ST. IV ACCESS ON LFA #20SL. GTIBE FEEDING AT 50CC/HR NO RESIDUAL. HEAD OF BED ELEVATED. SIDE 
RAILS UP. CALL LIGHT IS PLACED WITHIN REACH. WILL CONTINUE TO MONITOR PT CLOSELY.

## 2018-05-22 NOTE — NUR
RN NOTE



RECEIVED PATIENT IN BED, AWAKE ALERT AND ORIENTED WATCHING T.V, SHE IS ABLE TO COMMUNICATE 
AND MOUTH HER WORDS TO VERBALIZE NEEDS, ON VENTILATOR SUPPORT WITH APPROPRIATE SETTINGS AND 
TOLERATING WELL. ON CARDIAC MONITOR SINUS TACHY HR . GT SITE INTACT AND PATENT WITH 
ONGOING FEEDINGS RUNNING WELL WITH NO RESIDUALS NOTED. LEFT FA IV SITE INTACT AND PATENT. 
BED LOCKED AND LOW POSITION, ALL ISOLATION PRECAUTIONS DONE, PLACED CALL LIGHT WITH IN 
REACH, WILL CONTINUE TO MONITOR.

## 2018-05-22 NOTE — NUR
RN NOTE



CALLED [facility] [phone] SPOKE TO SULEIMAN RN SUPERVISOR AND GAVE REPORT ABOUT 
PATIENT. PATIENT AWARE OF TRANSFER, CALLED PATIENTS  TO NOTIFY HI M ABOUT THE 
TRANSFER. ESTIMATE  TIME 1900. WILL ENDORSE TO NEXT SHIFT IF  TIME WILL BE 
LATER

## 2018-11-24 NOTE — NUR
SLEEP STUDY REPORT      PATIENT NAME:  DEION SANCHEZ                 MR#: [id number]      ACCT#:  [id number]      STUDY DATE:  06/23/2017      REFERRING PHYSICIAN:  Oscar Sagastume M.D.         CLINICAL HISTORY:  Sleep apnea.  Body mass index 42.61.      MEDICATIONS:  List reviewed.      SLEEP ARCHITECTURE:  Total sleep time 5.4 hours.  Sleep onset appropriate in   16 minutes.  REM delayed and documented 207.5 minutes from sleep onset.  Sleep   efficiency 80%.  One hundred sixty-three arousals were documented with index   of 30 and majority of them due to respiratory events.  Sleep stages analysis   revealed increased amount of slow-wave sleep in treatment portion of the   study.  REM rebound noted.      RESPIRATORY EVENTS:  During diagnostic portion of the study, the patient   remained in supine position.  Her baseline O2 saturation was 93% and mean   saturation during test was 92%.  Desaturations were observed with ángela of 79%   and 44.4 minutes were spent with saturation less than 91%.  Loud snoring was   noted throughout the study.  In diagnostic portion of the test, the patient   demonstrated 7 obstructive apnea and 117 hypopnea resulted in AHI of 99.9   events per hour.      TREATMENT:  In treatment portion of the test, CPAP was initiated at 4 cm water   pressure utilizing [employer] Simplus size small full-face mask with   heated humidity applied.  Treatment protocol conducted up to 11 cm water   pressure which eliminated significant proportion of respiratory events and   snoring.  Overall, the patient tolerated treatment well and supine REM was   observed.      CARDIAC EVENTS:  The patient's mean heart rate during test was 70 beats per   minute, sinus rhythm.      LIMB MOVEMENT:  Not observed.      EEG:  Unremarkable.      IMPRESSION:  Obstructive sleep apnea syndrome, severe.      RECOMMENDATIONS:  Speech therapy is indicated.  Setting advised AutoSet 10-20   cm  TELE/RN NOTES:



NO ACUTE CHANGES NOTED DURING THIS SHIFT. REPORT GIVEN TO AM NURSE FOR PHONG. water pressure utilizing [employer] Simplus size small full-face   mask with heated humidity applied.  Treatment compliance and efficacy should   be reviewed and documented in approximately 30 days after initiating therapy.      Thank you for referral.      ICD-10 CODE:         _______________________________________   Susu Hanson M.D.                               Polysomnogram - 1                                    DEION SANCHEZ: SLP   RM#:     DCdate: 06/23/2017      DD: 06/27/2017 DT: 06/28/2017 TD: 16:03 TT: 03:53 K#: [id number]                            Polysomnogram - 2

## 2021-04-23 NOTE — NUR
Sent [employer] message.   patient in stable condition.no s/s of distress.all needs attended.safety measures in place
